# Patient Record
Sex: FEMALE | Race: WHITE | NOT HISPANIC OR LATINO | Employment: OTHER | URBAN - METROPOLITAN AREA
[De-identification: names, ages, dates, MRNs, and addresses within clinical notes are randomized per-mention and may not be internally consistent; named-entity substitution may affect disease eponyms.]

---

## 2017-01-09 ENCOUNTER — OFFICE VISIT (OUTPATIENT)
Dept: PULMONOLOGY | Facility: CLINIC | Age: 72
End: 2017-01-09

## 2017-01-09 VITALS
BODY MASS INDEX: 30.48 KG/M2 | DIASTOLIC BLOOD PRESSURE: 60 MMHG | SYSTOLIC BLOOD PRESSURE: 122 MMHG | RESPIRATION RATE: 16 BRPM | OXYGEN SATURATION: 90 % | HEIGHT: 63 IN | TEMPERATURE: 96.6 F | HEART RATE: 67 BPM | WEIGHT: 172 LBS

## 2017-01-09 DIAGNOSIS — K22.4 ESOPHAGEAL DYSMOTILITY: ICD-10-CM

## 2017-01-09 DIAGNOSIS — I51.89 DIASTOLIC DYSFUNCTION: ICD-10-CM

## 2017-01-09 DIAGNOSIS — J44.9 SEVERE CHRONIC OBSTRUCTIVE PULMONARY DISEASE (HCC): Primary | ICD-10-CM

## 2017-01-09 DIAGNOSIS — Z99.81 DEPENDENCE ON SUPPLEMENTAL OXYGEN: ICD-10-CM

## 2017-01-09 DIAGNOSIS — I27.20 PULMONARY HTN (HCC): ICD-10-CM

## 2017-01-09 PROCEDURE — 99214 OFFICE O/P EST MOD 30 MIN: CPT | Performed by: NURSE PRACTITIONER

## 2017-01-09 RX ORDER — MELOXICAM 7.5 MG/1
7.5 TABLET ORAL 2 TIMES DAILY
COMMUNITY

## 2017-01-09 NOTE — MR AVS SNAPSHOT
Virgie HOOKS Cruz   1/9/2017 1:00 PM   Office Visit    Dept Phone:  [phone]   Encounter #:  [id number]    Provider:  IRVING Lainez   Department:  [facility]                Your Full Care Plan              Today's Medication Changes          These changes are accurate as of: 1/9/17 11:15 AM.  If you have any questions, ask your nurse or doctor.               Stop taking medication(s)listed here:     ciprofloxacin 500 MG tablet   Commonly known as:  CIPRO           diclofenac-misoprostol 75-0.2 MG EC tablet   Commonly known as:  ARTHROTEC 75           ferrous sulfate 325 (65 FE) MG tablet           Umeclidinium Bromide 62.5 MCG/INH aerosol powder                       Your Updated Medication List          This list is accurate as of: 1/9/17 11:15 AM.  Always use your most recent med list.                acetaminophen 325 MG tablet   Commonly known as:  TYLENOL       albuterol (2.5 MG/3ML) 0.083% nebulizer solution   Commonly known as:  PROVENTIL   Take 2.5 mg by nebulization every 4 (four) hours as needed for wheezing.       aspirin 81 MG EC tablet       * BREO ELLIPTA 100-25 MCG/INH aerosol powder    Generic drug:  Fluticasone Furoate-Vilanterol       * Fluticasone Furoate-Vilanterol 100-25 MCG/INH aerosol powder    Inhale 1 puff daily.       buPROPion  MG 24 hr tablet   Commonly known as:  WELLBUTRIN XL       busPIRone 10 MG tablet   Commonly known as:  BUSPAR       fluorouracil 5 % cream   Commonly known as:  EFUDEX       furosemide 20 MG tablet   Commonly known as:  LASIX       gabapentin 800 MG tablet   Commonly known as:  NEURONTIN       hydrOXYzine 50 MG tablet   Commonly known as:  ATARAX       levocetirizine 5 MG tablet   Commonly known as:  XYZAL       levothyroxine 50 MCG tablet   Commonly known as:  SYNTHROID, LEVOTHROID       losartan 50 MG tablet   Commonly known as:  COZAAR       losartan-hydrochlorothiazide 100-25 MG per  "tablet   Commonly known as:  HYZAAR       magnesium oxide 400 (241.3 MG) MG tablet tablet   Commonly known as:  MAGOX       meloxicam 7.5 MG tablet   Commonly known as:  MOBIC       metoprolol succinate XL 50 MG 24 hr tablet   Commonly known as:  TOPROL-XL       montelukast 10 MG tablet   Commonly known as:  SINGULAIR       O2   Commonly known as:  OXYGEN       pantoprazole 40 MG EC tablet   Commonly known as:  PROTONIX       pravastatin 40 MG tablet   Commonly known as:  PRAVACHOL       spironolactone 25 MG tablet   Commonly known as:  ALDACTONE       * Notice:  This list has 2 medication(s) that are the same as other medications prescribed for you. Read the directions carefully, and ask your doctor or other care provider to review them with you.            Instructions     None    Patient Instructions History      Upcoming Appointments     Visit Type Date Time Department    FOLLOW UP 1/9/2017  1:00 PM [facility]      [employer] Signup     Our records indicate that you have declined [facility] signup. If you would like to sign up for [employer], please email [email or web address] or call [phone] to obtain an activation code.             Other Info from Your Visit           Your Appointments     Jan 09, 2017  1:00 PM EST   Follow Up with IRVING Lainez   [facility] (--)    Jolie Armstrong. [address] [place] [id number] [phone]           Arrive 15 minutes prior to appointment.              Allergies     No Known Allergies      Reason for Visit     COPD           Vital Signs     Blood Pressure Pulse Temperature Respirations Height Weight    122/60 67 96.6 °F (35.9 °C) 16 63\" (160 cm) 172 lb (78 kg)    Oxygen Saturation Body Mass Index Smoking Status             90% 30.47 kg/m2 Former Smoker           "

## 2017-01-09 NOTE — PROGRESS NOTES
[facility] Pulmonary Follow up    CHIEF COMPLAINT    Six-month follow-up    HISTORY OF PRESENT ILLNESS    Virgie Arroyo is a 71 y.o.female here today for 6 month follow-up.  She has a history of chronic short pulmonary disease with PFTs consistent with severe obstruction.  She is last seen in the office in June.  She takes Breo and Incruse.  She is here today with some paperwork for patient assistance with her medications.  Those 2 seem to be working well for her.  She has tried other inhalers in the past and they have not been quite as effective.  She does have a chronic cough as well as chronic dyspnea.  She's chronic respiratory failure and is on 2 L nasal cannula at home.  She tolerates it well and does help with her chronic dyspnea on exertion.    She has no significant reflux symptoms.  She does have a history of esophageal dysmotility but denies any history of dysphagia.    She does not complain of any sinus allergies or drainage.    She does not clinically chest pain chest pressure palpitations.  She denies any lower extremity edema.    Since her last hospitalization last April she has been walking with a walker due to some generalized debility.    Patient Active Problem List   Diagnosis   • Severe chronic obstructive pulmonary disease   • Esophageal dysmotility   • Atopic rhinitis   • Diastolic dysfunction   • Acute bronchitis   • Anxiety   • Depression   • Atypical chest pain   • Congestive heart failure   • Dyslipidemia   • Hypertension   • Hypothyroidism   • Osteoarthritis   • Dependence on supplemental oxygen   • Pulmonary HTN       No Known Allergies    Current Outpatient Prescriptions:   •  acetaminophen (TYLENOL) 325 MG tablet, Take 650 mg by mouth every 6 (six) hours as needed for mild pain (1-3)., Disp: , Rfl:   •  albuterol (PROVENTIL) (2.5 MG/3ML) 0.083% nebulizer solution, Take 2.5 mg by nebulization every 4 (four) hours as needed for wheezing., Disp: 60 vial, Rfl: 12  •  aspirin 81 MG EC  tablet, Take 81 mg by mouth daily., Disp: , Rfl:   •  buPROPion XL (WELLBUTRIN XL) 300 MG 24 hr tablet, Take 300 mg by mouth daily., Disp: , Rfl:   •  busPIRone (BUSPAR) 10 MG tablet, Take 10 mg by mouth 3 (three) times a day., Disp: , Rfl:   •  fluorouracil (EFUDEX) 5 % cream, Apply  topically 2 (two) times a day., Disp: , Rfl:   •  Fluticasone Furoate-Vilanterol (BREO ELLIPTA) 100-25 MCG/INH aerosol powder , Inhale., Disp: , Rfl:   •  Fluticasone Furoate-Vilanterol 100-25 MCG/INH aerosol powder , Inhale 1 puff daily., Disp: 1 each, Rfl: 11  •  furosemide (LASIX) 20 MG tablet, Take 20 mg by mouth 2 (two) times a day., Disp: , Rfl:   •  gabapentin (NEURONTIN) 800 MG tablet, daily., Disp: , Rfl:   •  hydrOXYzine (ATARAX) 50 MG tablet, Take 50 mg by mouth 3 (three) times a day as needed for itching., Disp: , Rfl:   •  levocetirizine (XYZAL) 5 MG tablet, Take 5 mg by mouth every evening., Disp: , Rfl:   •  levothyroxine (SYNTHROID, LEVOTHROID) 50 MCG tablet, Take 50 mcg by mouth daily., Disp: , Rfl:   •  losartan (COZAAR) 50 MG tablet, Take 50 mg by mouth daily., Disp: , Rfl:   •  losartan-hydrochlorothiazide (HYZAAR) 100-25 MG per tablet, Take 1 tablet by mouth daily., Disp: , Rfl:   •  magnesium oxide (MAGOX) 400 (241.3 MG) MG tablet tablet, Take 400 mg by mouth daily., Disp: , Rfl:   •  meloxicam (MOBIC) 7.5 MG tablet, Take 7.5 mg by mouth Daily., Disp: , Rfl:   •  metoprolol succinate XL (TOPROL-XL) 50 MG 24 hr tablet, Take 50 mg by mouth daily., Disp: , Rfl:   •  montelukast (SINGULAIR) 10 MG tablet, Take 10 mg by mouth every night., Disp: , Rfl:   •  O2 (OXYGEN), Inhale 3 L/min daily., Disp: , Rfl:   •  pantoprazole (PROTONIX) 40 MG EC tablet, Take 40 mg by mouth daily., Disp: , Rfl:   •  pravastatin (PRAVACHOL) 40 MG tablet, Take 40 mg by mouth daily., Disp: , Rfl:   •  spironolactone (ALDACTONE) 25 MG tablet, Take 25 mg by mouth daily., Disp: , Rfl:   MEDICATION LIST AND ALLERGIES REVIEWED.    Social History  "  Substance Use Topics   • Smoking status: Former Smoker     Packs/day: 2.00     Years: 30.00     Types: Cigarettes     Start date: 1972     Quit date: 2002   • Smokeless tobacco: Not on file   • Alcohol use 1.2 oz/week     2 Cans of beer per week      Comment: daily.       FAMILY AND SOCIAL HISTORY REVIEWED.    Review of Systems   Constitutional: Positive for fatigue. Negative for chills, fever and unexpected weight change.   HENT: Negative for congestion, nosebleeds, postnasal drip, rhinorrhea, sinus pressure and trouble swallowing.    Respiratory: Positive for cough and shortness of breath. Negative for chest tightness and wheezing.    Cardiovascular: Negative for chest pain and leg swelling.   Gastrointestinal: Negative for abdominal pain, constipation, diarrhea, nausea and vomiting.   Genitourinary: Negative for dysuria, frequency, hematuria and urgency.   Musculoskeletal: Positive for gait problem. Negative for myalgias.   Neurological: Positive for weakness. Negative for dizziness, numbness and headaches.   All other systems reviewed and are negative.  .    Visit Vitals   • /60   • Pulse 67   • Temp 96.6 °F (35.9 °C)   • Resp 16   • Ht 63\" (160 cm)   • Wt 172 lb (78 kg)   • SpO2 90%   • BMI 30.47 kg/m2     Physical Exam   Constitutional: She is oriented to person, place, and time. She appears well-developed and well-nourished.   Uses a walker, obese   HENT:   Head: Normocephalic and atraumatic.   Eyes: EOM are normal. Pupils are equal, round, and reactive to light.   Neck: Normal range of motion. Neck supple.   Cardiovascular: Normal rate and regular rhythm.    No murmur heard.  Pulmonary/Chest: Effort normal. No respiratory distress. She has wheezes. She has no rales.   Occasional rhonchi, slight wheeze right greater than left   Abdominal: Soft. Bowel sounds are normal. She exhibits no distension.   Musculoskeletal: Normal range of motion. She exhibits no edema.   Neurological: She is alert and " oriented to person, place, and time.   Skin: Skin is warm and dry. No erythema.   Psychiatric: She has a normal mood and affect. Her behavior is normal.   Vitals reviewed.      RESULTS        PROBLEM LIST    Problem List Items Addressed This Visit        Cardiovascular and Mediastinum    Diastolic dysfunction    Pulmonary HTN       Respiratory    Severe chronic obstructive pulmonary disease - Primary    Overview     Description: A.  Emphysema with severe physiologic airway obstruction.  B.  Dyspnea and hypoxia, on 4 L oxygen at rest and 6 L with activity.  C.  Former smoker.            Digestive    Esophageal dysmotility    Overview     Description: A.  Upper GI of 10/20/2014 reports a small hiatal hernia with esophageal dysmotility.            Other    Dependence on supplemental oxygen    Overview     · A.  4 L at rest and up to 6 with exertion.                    DISCUSSION    Follow-up in 6 months with PFTs and chest x-ray    Continue on Breo and Incruse, we will help the patient assistance paperwork    Continue on continuous oxygen secondary to chronic hypoxic respiratory failure    Continue on PPI and reflux precautions.    Gilda Jesus, IRVING  01/09/20172:03 PM  Electronically signed     Please note that portions of this note were completed with a voice recognition program. Efforts were made to edit the dictations, but occasionally words are mistranscribed.      CC: Juliette Rodríguez MD

## 2017-04-07 ENCOUNTER — TELEPHONE (OUTPATIENT)
Dept: PULMONOLOGY | Facility: CLINIC | Age: 72
End: 2017-04-07

## 2017-04-07 NOTE — TELEPHONE ENCOUNTER
Returned patient call from voicemail received this morning, patient did not answer and there was no option for voicemail.

## 2017-04-17 DIAGNOSIS — R06.02 SOB (SHORTNESS OF BREATH): Primary | ICD-10-CM

## 2017-07-13 ENCOUNTER — OFFICE VISIT (OUTPATIENT)
Dept: PULMONOLOGY | Facility: CLINIC | Age: 72
End: 2017-07-13

## 2017-07-13 VITALS
OXYGEN SATURATION: 94 % | BODY MASS INDEX: 34.32 KG/M2 | RESPIRATION RATE: 18 BRPM | TEMPERATURE: 97.6 F | HEIGHT: 61 IN | HEART RATE: 58 BPM | DIASTOLIC BLOOD PRESSURE: 64 MMHG | WEIGHT: 181.8 LBS | SYSTOLIC BLOOD PRESSURE: 118 MMHG

## 2017-07-13 DIAGNOSIS — J44.9 SEVERE CHRONIC OBSTRUCTIVE PULMONARY DISEASE (HCC): Primary | ICD-10-CM

## 2017-07-13 DIAGNOSIS — I27.20 PULMONARY HTN (HCC): ICD-10-CM

## 2017-07-13 DIAGNOSIS — K22.4 ESOPHAGEAL DYSMOTILITY: ICD-10-CM

## 2017-07-13 DIAGNOSIS — Z99.81 DEPENDENCE ON SUPPLEMENTAL OXYGEN: ICD-10-CM

## 2017-07-13 DIAGNOSIS — J30.9 ATOPIC RHINITIS: ICD-10-CM

## 2017-07-13 DIAGNOSIS — I51.89 DIASTOLIC DYSFUNCTION: ICD-10-CM

## 2017-07-13 PROCEDURE — 99214 OFFICE O/P EST MOD 30 MIN: CPT | Performed by: NURSE PRACTITIONER

## 2017-07-13 PROCEDURE — 71020 CHG CHEST X-RAY 2 VW: CPT | Performed by: NURSE PRACTITIONER

## 2017-07-13 PROCEDURE — 94010 BREATHING CAPACITY TEST: CPT | Performed by: NURSE PRACTITIONER

## 2017-07-13 RX ORDER — ALBUTEROL SULFATE 90 UG/1
2 AEROSOL, METERED RESPIRATORY (INHALATION) EVERY 6 HOURS PRN
COMMUNITY
End: 2017-12-27 | Stop reason: HOSPADM

## 2017-07-13 RX ORDER — FLUTICASONE PROPIONATE 50 MCG
SPRAY, SUSPENSION (ML) NASAL DAILY
COMMUNITY
Start: 2017-07-07 | End: 2017-12-20

## 2017-07-13 RX ORDER — VITAMIN E 268 MG
CAPSULE ORAL
COMMUNITY
Start: 2017-04-18 | End: 2017-10-10 | Stop reason: ALTCHOICE

## 2017-07-13 RX ORDER — OXYBUTYNIN CHLORIDE 5 MG/1
1 TABLET ORAL 3 TIMES DAILY
COMMUNITY
Start: 2017-06-03 | End: 2017-12-20

## 2017-07-13 NOTE — PROGRESS NOTES
[facility] Follow [facility]    CHIEF COMPLAINT    COPD follow-up    HISTORY OF PRESENT ILLNESS    Virgie Arroyo is a 71 y.o.female here today for six-month follow-up with her severe obstructive airway disease.  She has done well over the last 6 months.  She's had no acute exacerbations the use of steroids or antibiotics.  She continues on her maintenance medications daily and is doing well.  She uses nebulizers as needed.  She has no cough or sputum production at this time.  She has chronic dyspnea on exertion as well as chronic hypoxemia and uses oxygen at 2 L nasal cannula.  She has chronic debility and uses a walker.  She denies any worsening in her weakness.  She denies any falls.    Unfortunately her son has been in the hospital in [place] for the last month.  He also has emphysema and had a pneumonia and now has a tracheostomy and still on the ventilator.  She's been quite worried about him as well as traveling back and forth.        Patient Active Problem List   Diagnosis   • Severe chronic obstructive pulmonary disease   • Esophageal dysmotility   • Atopic rhinitis   • Diastolic dysfunction   • Acute bronchitis   • Anxiety   • Depression   • Atypical chest pain   • Congestive heart failure   • Dyslipidemia   • Hypertension   • Hypothyroidism   • Osteoarthritis   • Dependence on supplemental oxygen   • Pulmonary HTN       No Known Allergies    Current Outpatient Prescriptions:   •  acetaminophen (TYLENOL) 325 MG tablet, Take 650 mg by mouth every 6 (six) hours as needed for mild pain (1-3)., Disp: , Rfl:   •  albuterol (PROVENTIL) (2.5 MG/3ML) 0.083% nebulizer solution, Take 2.5 mg by nebulization every 4 (four) hours as needed for wheezing., Disp: 60 vial, Rfl: 12  •  aspirin 81 MG EC tablet, Take 81 mg by mouth daily., Disp: , Rfl:   •  buPROPion XL (WELLBUTRIN XL) 300 MG 24 hr tablet, Take 300 mg by mouth daily., Disp: , Rfl:   •  busPIRone (BUSPAR) 10 MG tablet, Take 10 mg by mouth 3 (three) times a  day., Disp: , Rfl:   •  Cyanocobalamin (VITAMIN B-12 CR PO), , Disp: , Rfl:   •  fluorouracil (EFUDEX) 5 % cream, Apply  topically 2 (two) times a day., Disp: , Rfl:   •  fluticasone (FLONASE) 50 MCG/ACT nasal spray, Daily., Disp: , Rfl:   •  Fluticasone Furoate-Vilanterol (BREO ELLIPTA) 100-25 MCG/INH aerosol powder , Inhale., Disp: , Rfl:   •  furosemide (LASIX) 20 MG tablet, Take 20 mg by mouth 2 (two) times a day., Disp: , Rfl:   •  gabapentin (NEURONTIN) 800 MG tablet, daily., Disp: , Rfl:   •  hydrOXYzine (ATARAX) 50 MG tablet, Take 50 mg by mouth As Needed for Itching., Disp: , Rfl:   •  levocetirizine (XYZAL) 5 MG tablet, Take 5 mg by mouth every evening., Disp: , Rfl:   •  levothyroxine (SYNTHROID, LEVOTHROID) 50 MCG tablet, Take 50 mcg by mouth daily., Disp: , Rfl:   •  losartan (COZAAR) 50 MG tablet, Take 50 mg by mouth daily., Disp: , Rfl:   •  magnesium oxide (MAGOX) 400 (241.3 MG) MG tablet tablet, Take 400 mg by mouth daily., Disp: , Rfl:   •  meloxicam (MOBIC) 7.5 MG tablet, Take 7.5 mg by mouth Daily., Disp: , Rfl:   •  metoprolol succinate XL (TOPROL-XL) 50 MG 24 hr tablet, Take 50 mg by mouth daily., Disp: , Rfl:   •  montelukast (SINGULAIR) 10 MG tablet, Take 10 mg by mouth every night., Disp: , Rfl:   •  Multiple Vitamins-Calcium (ONE-A-DAY WOMENS PO), , Disp: , Rfl:   •  O2 (OXYGEN), Inhale 3 L/min daily., Disp: , Rfl:   •  oxybutynin (DITROPAN) 5 MG tablet, 1 tablet 3 (Three) Times a Day., Disp: , Rfl:   •  pantoprazole (PROTONIX) 40 MG EC tablet, Take 40 mg by mouth daily., Disp: , Rfl:   •  pravastatin (PRAVACHOL) 40 MG tablet, Take 40 mg by mouth daily., Disp: , Rfl:   •  spironolactone (ALDACTONE) 25 MG tablet, Take 25 mg by mouth daily., Disp: , Rfl:   •  Umeclidinium Bromide 62.5 MCG/INH aerosol powder , Inhale 1 puff Daily. 1 inhalation once a day, Disp: 1 each, Rfl: 5  •  vitamin E 400 UNIT capsule, , Disp: , Rfl:   MEDICATION LIST AND ALLERGIES REVIEWED.    Social History   Substance  "Use Topics   • Smoking status: Former Smoker     Packs/day: 2.00     Years: 30.00     Types: Cigarettes     Start date: 1972     Quit date: 2002   • Smokeless tobacco: Not on file   • Alcohol use 1.2 oz/week     2 Cans of beer per week      Comment: daily.       FAMILY AND SOCIAL HISTORY REVIEWED.    Review of Systems   Constitutional: Positive for fatigue. Negative for chills, fever and unexpected weight change.   HENT: Positive for postnasal drip and sinus pressure. Negative for congestion, nosebleeds, rhinorrhea and trouble swallowing.    Respiratory: Positive for cough and shortness of breath. Negative for chest tightness and wheezing.    Cardiovascular: Negative for chest pain and leg swelling.   Gastrointestinal: Negative for abdominal pain, constipation, diarrhea, nausea and vomiting.   Genitourinary: Negative for dysuria, frequency, hematuria and urgency.   Musculoskeletal: Positive for arthralgias and gait problem. Negative for myalgias.   Neurological: Positive for weakness. Negative for dizziness, numbness and headaches.   All other systems reviewed and are negative.  .    /64  Pulse 58  Temp 97.6 °F (36.4 °C)  Resp 18  Ht 60.5\" (153.7 cm)  Wt 181 lb 12.8 oz (82.5 kg)  SpO2 94% Comment: 3L P/D  BMI 34.92 kg/m2    Physical Exam   Constitutional: She is oriented to person, place, and time. She appears well-developed and well-nourished.   Obese, debilitated   HENT:   Head: Normocephalic and atraumatic.   Eyes: EOM are normal. Pupils are equal, round, and reactive to light.   Neck: Normal range of motion. Neck supple.   Cardiovascular: Normal rate and regular rhythm.    No murmur heard.  Pulmonary/Chest: Effort normal. No respiratory distress. She has wheezes. She has no rales.   Coarse rales bilaterally with expiratory wheezing   Abdominal: Soft. Bowel sounds are normal. She exhibits no distension.   Musculoskeletal: Normal range of motion. She exhibits no edema.   Neurological: She is alert and " oriented to person, place, and time.   Skin: Skin is warm and dry. No erythema.   Psychiatric: She has a normal mood and affect. Her behavior is normal.   Vitals reviewed.        RESULTS    PFTS in the office today, read by me:No severe change in 2016 with severe obstructive airway disease, FEV1 0.9 147%.    PA and lateral chest x-ray done in the office today, reviewed by me: No acute infiltrates or effusions    PROBLEM LIST    Problem List Items Addressed This Visit        Cardiovascular and Mediastinum    Diastolic dysfunction    Pulmonary HTN       Respiratory    Severe chronic obstructive pulmonary disease - Primary    Overview     Description: A.  Emphysema with severe physiologic airway obstruction.  B.  Dyspnea and hypoxia, on 4 L oxygen at rest and 6 L with activity.  C.  Former smoker.         Relevant Medications    fluticasone (FLONASE) 50 MCG/ACT nasal spray    Other Relevant Orders    XR Chest PA & Lateral    Atopic rhinitis       Digestive    Esophageal dysmotility    Overview     Description: A.  Upper GI of 10/20/2014 reports a small hiatal hernia with esophageal dysmotility.            Other    Dependence on supplemental oxygen    Overview     · A.  4 L at rest and up to 6 with exertion.                    DISCUSSION    A total of 20 minutes was spent with face-to-face time in the office today discussing COPD management.  We spent time counseling on tobacco cessation, maintenance medication, the use of oxygen with chronic respiratory failure, patient and family education regarding disease management.  As well as the importance of compliance with medication and regular follow-up.    She'll continue on her current regimen of Breo and Incruse daily.  She has nebulizers to be used as needed for shortness of breath wheezing or cough.  Continue on her oxygen continuously.  Does help with her chronic dyspnea.  She uses [facility] for her DME.  Continue with her reflux precautions and  PPI.    Follow-up in 6 months    Gilda Jesus, APRN  07/13/201712:29 PM  Electronically signed     Please note that portions of this note were completed with a voice recognition program. Efforts were made to edit the dictations, but occasionally words are mistranscribed.      CC: Juliette Rodríguez MD

## 2017-10-10 ENCOUNTER — OFFICE VISIT (OUTPATIENT)
Dept: PULMONOLOGY | Facility: CLINIC | Age: 72
End: 2017-10-10

## 2017-10-10 VITALS
HEIGHT: 61 IN | DIASTOLIC BLOOD PRESSURE: 80 MMHG | OXYGEN SATURATION: 89 % | TEMPERATURE: 96.7 F | HEART RATE: 64 BPM | RESPIRATION RATE: 16 BRPM | SYSTOLIC BLOOD PRESSURE: 122 MMHG | WEIGHT: 181 LBS | BODY MASS INDEX: 34.17 KG/M2

## 2017-10-10 DIAGNOSIS — I50.9 CONGESTIVE HEART FAILURE, UNSPECIFIED CONGESTIVE HEART FAILURE CHRONICITY, UNSPECIFIED CONGESTIVE HEART FAILURE TYPE: ICD-10-CM

## 2017-10-10 DIAGNOSIS — R53.83 FATIGUE, UNSPECIFIED TYPE: ICD-10-CM

## 2017-10-10 DIAGNOSIS — J44.9 SEVERE CHRONIC OBSTRUCTIVE PULMONARY DISEASE (HCC): Primary | ICD-10-CM

## 2017-10-10 DIAGNOSIS — Z99.81 DEPENDENCE ON SUPPLEMENTAL OXYGEN: ICD-10-CM

## 2017-10-10 LAB
ALBUMIN SERPL-MCNC: 4.4 G/DL (ref 3.2–4.8)
ALBUMIN/GLOB SERPL: 1.8 G/DL (ref 1.5–2.5)
ALP SERPL-CCNC: 66 U/L (ref 25–100)
ALT SERPL W P-5'-P-CCNC: 22 U/L (ref 7–40)
ANION GAP SERPL CALCULATED.3IONS-SCNC: 8 MMOL/L (ref 3–11)
AST SERPL-CCNC: 30 U/L (ref 0–33)
BASOPHILS # BLD AUTO: 0.04 10*3/MM3 (ref 0–0.2)
BASOPHILS NFR BLD AUTO: 0.7 % (ref 0–1)
BILIRUB SERPL-MCNC: 0.6 MG/DL (ref 0.3–1.2)
BNP SERPL-MCNC: 247 PG/ML (ref 0–100)
BUN BLD-MCNC: 14 MG/DL (ref 9–23)
BUN/CREAT SERPL: 20 (ref 7–25)
CALCIUM SPEC-SCNC: 9 MG/DL (ref 8.7–10.4)
CHLORIDE SERPL-SCNC: 93 MMOL/L (ref 99–109)
CO2 SERPL-SCNC: 30 MMOL/L (ref 20–31)
CREAT BLD-MCNC: 0.7 MG/DL (ref 0.6–1.3)
DEPRECATED RDW RBC AUTO: 47.9 FL (ref 37–54)
EOSINOPHIL # BLD AUTO: 0.14 10*3/MM3 (ref 0–0.3)
EOSINOPHIL NFR BLD AUTO: 2.5 % (ref 0–3)
ERYTHROCYTE [DISTWIDTH] IN BLOOD BY AUTOMATED COUNT: 13.9 % (ref 11.3–14.5)
GFR SERPL CREATININE-BSD FRML MDRD: 82 ML/MIN/1.73
GLOBULIN UR ELPH-MCNC: 2.4 GM/DL
GLUCOSE BLD-MCNC: 89 MG/DL (ref 70–100)
HCT VFR BLD AUTO: 35 % (ref 34.5–44)
HGB BLD-MCNC: 11.9 G/DL (ref 11.5–15.5)
IMM GRANULOCYTES # BLD: 0.01 10*3/MM3 (ref 0–0.03)
IMM GRANULOCYTES NFR BLD: 0.2 % (ref 0–0.6)
LYMPHOCYTES # BLD AUTO: 0.99 10*3/MM3 (ref 0.6–4.8)
LYMPHOCYTES NFR BLD AUTO: 17.9 % (ref 24–44)
MCH RBC QN AUTO: 31.9 PG (ref 27–31)
MCHC RBC AUTO-ENTMCNC: 34 G/DL (ref 32–36)
MCV RBC AUTO: 93.8 FL (ref 80–99)
MONOCYTES # BLD AUTO: 0.48 10*3/MM3 (ref 0–1)
MONOCYTES NFR BLD AUTO: 8.7 % (ref 0–12)
NEUTROPHILS # BLD AUTO: 3.87 10*3/MM3 (ref 1.5–8.3)
NEUTROPHILS NFR BLD AUTO: 70 % (ref 41–71)
PLATELET # BLD AUTO: 242 10*3/MM3 (ref 150–450)
PMV BLD AUTO: 8.7 FL (ref 6–12)
POTASSIUM BLD-SCNC: 4.3 MMOL/L (ref 3.5–5.5)
PROT SERPL-MCNC: 6.8 G/DL (ref 5.7–8.2)
RBC # BLD AUTO: 3.73 10*6/MM3 (ref 3.89–5.14)
SODIUM BLD-SCNC: 131 MMOL/L (ref 132–146)
WBC NRBC COR # BLD: 5.53 10*3/MM3 (ref 3.5–10.8)

## 2017-10-10 PROCEDURE — 99213 OFFICE O/P EST LOW 20 MIN: CPT | Performed by: NURSE PRACTITIONER

## 2017-10-10 PROCEDURE — 80053 COMPREHEN METABOLIC PANEL: CPT | Performed by: NURSE PRACTITIONER

## 2017-10-10 PROCEDURE — 83880 ASSAY OF NATRIURETIC PEPTIDE: CPT | Performed by: NURSE PRACTITIONER

## 2017-10-10 PROCEDURE — 36415 COLL VENOUS BLD VENIPUNCTURE: CPT | Performed by: NURSE PRACTITIONER

## 2017-10-10 PROCEDURE — 85025 COMPLETE CBC W/AUTO DIFF WBC: CPT | Performed by: NURSE PRACTITIONER

## 2017-10-10 RX ORDER — FUROSEMIDE 40 MG/1
40 TABLET ORAL DAILY
COMMUNITY
Start: 2017-10-03 | End: 2018-02-07

## 2017-10-10 RX ORDER — PREDNISONE 10 MG/1
TABLET ORAL
Qty: 31 TABLET | Refills: 0 | Status: SHIPPED | OUTPATIENT
Start: 2017-10-10 | End: 2017-10-24

## 2017-10-10 NOTE — PROGRESS NOTES
[facility] Pulmonary Follow up    CHIEF COMPLAINT    Shortness of breath    HISTORY OF PRESENT ILLNESS    Virgie Arroyo is a 72 y.o.female here today for worsening shortness of breath and fatigue.  This has been a gradual worsening over the last few weeks.    She has severe COPD and remains on Breo and as needed albuterol.  She has been using her nebulized albuterol 3-4 times daily.  She has previously used Incruse but states that she has been out for a while.  She denies an increase in her baseline wheezing.  She denies cough, sputum production, fever, or chills.       She remains on 3 L supplemental oxygen.  She reports that she has been running around the low 90s whenever she checks her saturations.     She reports lower extremity edema.  Her PCP, Dr. Ayon, thought that her complaints could be related to her CHF.  He increased her diuretics and has been following up with her every 2 weeks to monitor her edema.  She reports it has improved some but she does still retain some fluid.          Patient Active Problem List   Diagnosis   • Severe chronic obstructive pulmonary disease   • Esophageal dysmotility   • Atopic rhinitis   • Diastolic dysfunction   • Acute bronchitis   • Anxiety   • Depression   • Atypical chest pain   • Congestive heart failure   • Dyslipidemia   • Hypertension   • Hypothyroidism   • Osteoarthritis   • Dependence on supplemental oxygen   • Pulmonary HTN       No Known Allergies    Current Outpatient Prescriptions:   •  acetaminophen (TYLENOL) 325 MG tablet, Take 650 mg by mouth every 6 (six) hours as needed for mild pain (1-3)., Disp: , Rfl:   •  albuterol (PROAIR HFA) 108 (90 BASE) MCG/ACT inhaler, Inhale 2 puffs As Needed for Wheezing., Disp: , Rfl:   •  albuterol (PROVENTIL) (2.5 MG/3ML) 0.083% nebulizer solution, Take 2.5 mg by nebulization every 4 (four) hours as needed for wheezing., Disp: 60 vial, Rfl: 12  •  aspirin 81 MG EC tablet, Take 81 mg by mouth daily., Disp: , Rfl:   •   busPIRone (BUSPAR) 10 MG tablet, Take 10 mg by mouth 3 (three) times a day., Disp: , Rfl:   •  fluorouracil (EFUDEX) 5 % cream, Apply  topically 2 (two) times a day., Disp: , Rfl:   •  fluticasone (FLONASE) 50 MCG/ACT nasal spray, Daily., Disp: , Rfl:   •  Fluticasone Furoate-Vilanterol (BREO ELLIPTA) 100-25 MCG/INH aerosol powder , Inhale., Disp: , Rfl:   •  furosemide (LASIX) 40 MG tablet, 1 tablet Daily., Disp: , Rfl:   •  gabapentin (NEURONTIN) 800 MG tablet, daily., Disp: , Rfl:   •  hydrOXYzine (ATARAX) 50 MG tablet, Take 50 mg by mouth As Needed for Itching., Disp: , Rfl:   •  levocetirizine (XYZAL) 5 MG tablet, Take 5 mg by mouth every evening., Disp: , Rfl:   •  levothyroxine (SYNTHROID, LEVOTHROID) 50 MCG tablet, Take 50 mcg by mouth daily., Disp: , Rfl:   •  losartan (COZAAR) 50 MG tablet, Take 50 mg by mouth daily., Disp: , Rfl:   •  meloxicam (MOBIC) 7.5 MG tablet, Take 7.5 mg by mouth Daily., Disp: , Rfl:   •  metoprolol succinate XL (TOPROL-XL) 50 MG 24 hr tablet, Take 50 mg by mouth daily., Disp: , Rfl:   •  montelukast (SINGULAIR) 10 MG tablet, Take 10 mg by mouth every night., Disp: , Rfl:   •  O2 (OXYGEN), Inhale 3 L/min daily., Disp: , Rfl:   •  oxybutynin (DITROPAN) 5 MG tablet, 1 tablet 3 (Three) Times a Day., Disp: , Rfl:   •  pantoprazole (PROTONIX) 40 MG EC tablet, Take 40 mg by mouth daily., Disp: , Rfl:   •  pravastatin (PRAVACHOL) 40 MG tablet, Take 40 mg by mouth daily., Disp: , Rfl:   •  spironolactone (ALDACTONE) 25 MG tablet, Take 25 mg by mouth daily., Disp: , Rfl:   •  Umeclidinium Bromide 62.5 MCG/INH aerosol powder , Inhale 1 puff Daily. 1 inhalation once a day, Disp: 1 each, Rfl: 5  •  predniSONE (DELTASONE) 10 MG tablet, Take 4 tabs daily x 4 days, then take 3 daily x 3 days, then take 2 daily for 2 days, then take 1 daily x 2 days, Disp: 31 tablet, Rfl: 0  MEDICATION LIST AND ALLERGIES REVIEWED.    Social History   Substance Use Topics   • Smoking status: Former Smoker      "Packs/day: 2.00     Years: 30.00     Types: Cigarettes     Start date: 1972     Quit date: 2002   • Smokeless tobacco: None   • Alcohol use 1.2 oz/week     2 Cans of beer per week      Comment: daily.       FAMILY AND SOCIAL HISTORY REVIEWED.    Review of Systems   Constitutional: Positive for fatigue. Negative for chills, fever and unexpected weight change.   HENT: Negative for congestion, nosebleeds, postnasal drip, rhinorrhea, sinus pressure and trouble swallowing.    Respiratory: Positive for shortness of breath. Negative for cough, chest tightness and wheezing.    Cardiovascular: Positive for leg swelling. Negative for chest pain.   Gastrointestinal: Negative for abdominal pain, constipation, diarrhea, nausea and vomiting.   Genitourinary: Negative for dysuria, frequency, hematuria and urgency.   Musculoskeletal: Negative for myalgias.   Neurological: Negative for dizziness, weakness, numbness and headaches.   All other systems reviewed and are negative.  .    /80  Pulse 64  Temp 96.7 °F (35.9 °C)  Resp 16  Ht 60.5\" (153.7 cm)  Wt 181 lb (82.1 kg)  SpO2 (!) 89% Comment: 3L P/D  BMI 34.77 kg/m2    Physical Exam   Constitutional: She appears well-developed. No distress.   Cardiovascular: Normal rate, regular rhythm and normal heart sounds.    No murmur heard.  Pulmonary/Chest: Effort normal. No respiratory distress. She has wheezes. She has no rales.   Musculoskeletal: She exhibits edema (mild BLE edema). She exhibits no tenderness.   In a wheelchair   Neurological: She is alert.   Skin: Skin is warm and dry. No erythema.   Psychiatric: She has a normal mood and affect. Her behavior is normal.   Vitals reviewed.        RESULTS        PROBLEM LIST    Problem List Items Addressed This Visit        Cardiovascular and Mediastinum    Congestive heart failure    Relevant Orders    CBC & Differential    Comprehensive Metabolic Panel    BNP    CBC Auto Differential       Respiratory    Severe chronic " obstructive pulmonary disease - Primary    Overview     Description: A.  Emphysema with severe physiologic airway obstruction.  B.  Dyspnea and hypoxia, on 4 L oxygen at rest and 6 L with activity.  C.  Former smoker.         Relevant Medications    predniSONE (DELTASONE) 10 MG tablet       Other    Dependence on supplemental oxygen    Overview     · A.  4 L at rest and up to 6 with exertion.              Other Visit Diagnoses     Fatigue, unspecified type        Relevant Orders    CBC & Differential    Comprehensive Metabolic Panel    BNP    CBC Auto Differential            DISCUSSION    Severe COPD-   -Continue Breo 100 , resume Incruse.  She was given samples of both.    -Prednisone taper for her wheezing   -We also discussed the possibility of changing her to a nebulized LABA+ICS.   We'll see how she responds to prednisone and adding Incruse back in before making this change.    CHF- continue diuretics as prescribed by PCP.  Check labs as above.    Follow-up in 2 weeks     IRVING Velarde  10/10/69090:28 PM  Electronically signed     Please note that portions of this note were completed with a voice recognition program. Efforts were made to edit the dictations, but occasionally words are mistranscribed.      CC: Juliette Rodríguez MD

## 2017-10-16 RX ORDER — ALBUTEROL SULFATE 2.5 MG/3ML
SOLUTION RESPIRATORY (INHALATION)
Qty: 120 ML | Refills: 11 | Status: SHIPPED | OUTPATIENT
Start: 2017-10-16 | End: 2017-12-27 | Stop reason: HOSPADM

## 2017-10-24 ENCOUNTER — OFFICE VISIT (OUTPATIENT)
Dept: PULMONOLOGY | Facility: CLINIC | Age: 72
End: 2017-10-24

## 2017-10-24 VITALS
BODY MASS INDEX: 34.17 KG/M2 | DIASTOLIC BLOOD PRESSURE: 70 MMHG | TEMPERATURE: 96.3 F | SYSTOLIC BLOOD PRESSURE: 142 MMHG | HEIGHT: 61 IN | OXYGEN SATURATION: 95 % | WEIGHT: 181 LBS | HEART RATE: 62 BPM | RESPIRATION RATE: 20 BRPM

## 2017-10-24 DIAGNOSIS — I50.9 CONGESTIVE HEART FAILURE, UNSPECIFIED CONGESTIVE HEART FAILURE CHRONICITY, UNSPECIFIED CONGESTIVE HEART FAILURE TYPE: ICD-10-CM

## 2017-10-24 DIAGNOSIS — Z99.81 DEPENDENCE ON SUPPLEMENTAL OXYGEN: ICD-10-CM

## 2017-10-24 DIAGNOSIS — J44.9 SEVERE CHRONIC OBSTRUCTIVE PULMONARY DISEASE (HCC): Primary | ICD-10-CM

## 2017-10-24 PROCEDURE — 99213 OFFICE O/P EST LOW 20 MIN: CPT | Performed by: NURSE PRACTITIONER

## 2017-10-24 NOTE — PROGRESS NOTES
"[facility] Pulmonary Follow up    CHIEF COMPLAINT    Shortness of breath    HISTORY OF PRESENT ILLNESS    Virgie Arroyo is a 72 y.o.female here today for follow-up on her gradually worsening shortness of breath and fatigue.  I last saw her 2 weeks ago.    At that time she was on Breo and using nebulized albuterol 3-4 times daily.  She had been out of Incruse for a while.  I provided her with Incruse samples and also gave her a pulse dose of prednisone as she had quite a bit of wheezing.  She completed her prednisone as prescribed and felt a little better while she was on it but is still short of breath.  We discussed possibly switching her from Breo nebulized Brovana on and Pulmicort.    She remains on 3 L supplemental oxygen and has still not experienced any desaturations.    I checked labs at her last visit including a CBC, CMP, and BNP.  Her BNP was mildly elevated at 247. She has CHF.  Her PCP thought some of her complaints could be related to this and increased her diuretics.  She reports that she developed \"kidney issues\" including incontinence.  She has not been taking her full dose of Lasix due to this.  She was actually the hospital yesterday where she had a CT of the abdomen and pelvis performed.  Apparently she's been having some hematuria.  She was prescribed antibiotics for a UTI and referred to nephrology.  She is supposed to make the nephrology appointment within a few days but she states that she might put this off until the first of the month for financial concerns.        Patient Active Problem List   Diagnosis   • Severe chronic obstructive pulmonary disease   • Esophageal dysmotility   • Atopic rhinitis   • Diastolic dysfunction   • Acute bronchitis   • Anxiety   • Depression   • Atypical chest pain   • Congestive heart failure   • Dyslipidemia   • Hypertension   • Hypothyroidism   • Osteoarthritis   • Dependence on supplemental oxygen   • Pulmonary HTN       No Known Allergies    Current " Outpatient Prescriptions:   •  acetaminophen (TYLENOL) 325 MG tablet, Take 650 mg by mouth every 6 (six) hours as needed for mild pain (1-3)., Disp: , Rfl:   •  albuterol (PROAIR HFA) 108 (90 BASE) MCG/ACT inhaler, Inhale 2 puffs As Needed for Wheezing., Disp: , Rfl:   •  albuterol (PROVENTIL) (2.5 MG/3ML) 0.083% nebulizer solution, IHALE ONE VIAL  VIA NEBULIZER EVERY 4 HOURS AS NEEDED FOR WHEEZING, Disp: 120 mL, Rfl: 11  •  aspirin 81 MG EC tablet, Take 81 mg by mouth daily., Disp: , Rfl:   •  busPIRone (BUSPAR) 10 MG tablet, Take 10 mg by mouth 3 (three) times a day., Disp: , Rfl:   •  fluorouracil (EFUDEX) 5 % cream, Apply  topically 2 (two) times a day., Disp: , Rfl:   •  fluticasone (FLONASE) 50 MCG/ACT nasal spray, Daily., Disp: , Rfl:   •  Fluticasone Furoate-Vilanterol (BREO ELLIPTA) 100-25 MCG/INH aerosol powder , Inhale., Disp: , Rfl:   •  furosemide (LASIX) 40 MG tablet, 1 tablet Daily., Disp: , Rfl:   •  gabapentin (NEURONTIN) 800 MG tablet, daily., Disp: , Rfl:   •  hydrOXYzine (ATARAX) 50 MG tablet, Take 50 mg by mouth As Needed for Itching., Disp: , Rfl:   •  levocetirizine (XYZAL) 5 MG tablet, Take 5 mg by mouth every evening., Disp: , Rfl:   •  levothyroxine (SYNTHROID, LEVOTHROID) 50 MCG tablet, Take 50 mcg by mouth daily., Disp: , Rfl:   •  losartan (COZAAR) 50 MG tablet, Take 50 mg by mouth daily., Disp: , Rfl:   •  meloxicam (MOBIC) 7.5 MG tablet, Take 7.5 mg by mouth Daily., Disp: , Rfl:   •  metoprolol succinate XL (TOPROL-XL) 50 MG 24 hr tablet, Take 50 mg by mouth daily., Disp: , Rfl:   •  montelukast (SINGULAIR) 10 MG tablet, Take 10 mg by mouth every night., Disp: , Rfl:   •  O2 (OXYGEN), Inhale 3 L/min daily., Disp: , Rfl:   •  oxybutynin (DITROPAN) 5 MG tablet, 1 tablet 3 (Three) Times a Day., Disp: , Rfl:   •  pantoprazole (PROTONIX) 40 MG EC tablet, Take 40 mg by mouth daily., Disp: , Rfl:   •  pravastatin (PRAVACHOL) 40 MG tablet, Take 40 mg by mouth daily., Disp: , Rfl:   •   "predniSONE (DELTASONE) 10 MG tablet, Take 4 tabs daily x 4 days, then take 3 daily x 3 days, then take 2 daily for 2 days, then take 1 daily x 2 days, Disp: 31 tablet, Rfl: 0  •  spironolactone (ALDACTONE) 25 MG tablet, Take 25 mg by mouth daily., Disp: , Rfl:   •  Umeclidinium Bromide 62.5 MCG/INH aerosol powder , Inhale 1 puff Daily. 1 inhalation once a day, Disp: 1 each, Rfl: 5  MEDICATION LIST AND ALLERGIES REVIEWED.    Social History   Substance Use Topics   • Smoking status: Former Smoker     Packs/day: 2.00     Years: 30.00     Types: Cigarettes     Start date: 1972     Quit date: 2002   • Smokeless tobacco: None   • Alcohol use 1.2 oz/week     2 Cans of beer per week      Comment: daily.       FAMILY AND SOCIAL HISTORY REVIEWED.    Review of Systems   Constitutional: Negative for chills, fever and unexpected weight change.   HENT: Negative for congestion, nosebleeds, postnasal drip, rhinorrhea, sinus pressure and trouble swallowing.    Respiratory: Positive for shortness of breath. Negative for cough, chest tightness and wheezing.    Cardiovascular: Positive for leg swelling. Negative for chest pain.   Gastrointestinal: Negative for abdominal pain, constipation, diarrhea, nausea and vomiting.   Genitourinary: Positive for hematuria and urgency. Negative for dysuria and frequency.   Musculoskeletal: Negative for myalgias.   Neurological: Negative for dizziness, weakness, numbness and headaches.   All other systems reviewed and are negative.  .    /70  Pulse 62  Temp 96.3 °F (35.7 °C)  Resp 20  Ht 60.5\" (153.7 cm)  Wt 181 lb (82.1 kg)  SpO2 95% Comment: 3 L continuous  PF (!) 3 L/min  BMI 34.77 kg/m2    Physical Exam   Constitutional: She appears well-developed. No distress.   Cardiovascular: Normal rate, regular rhythm and normal heart sounds.    No murmur heard.  Pulmonary/Chest: Effort normal. No respiratory distress. She has wheezes. She has no rales.   Musculoskeletal: She exhibits edema " (mild BLE edema). She exhibits no tenderness.   In a wheelchair   Neurological: She is alert.   Skin: Skin is warm and dry. No erythema.   Psychiatric: She has a normal mood and affect. Her behavior is normal.   Vitals reviewed.        RESULTS    Lab Results   Component Value Date    WBC 5.53 10/10/2017    HGB 11.9 10/10/2017    HCT 35.0 10/10/2017    MCV 93.8 10/10/2017     10/10/2017       Lab Results   Component Value Date    GLUCOSE 89 10/10/2017    BUN 14 10/10/2017    CREATININE 0.70 10/10/2017    EGFRIFNONA 82 10/10/2017    BCR 20.0 10/10/2017    K 4.3 10/10/2017    CO2 30.0 10/10/2017    CALCIUM 9.0 10/10/2017    ALBUMIN 4.40 10/10/2017    LABIL2 1.8 10/10/2017    AST 30 10/10/2017    ALT 22 10/10/2017         PROBLEM LIST    Problem List Items Addressed This Visit        Cardiovascular and Mediastinum    Congestive heart failure       Respiratory    Severe chronic obstructive pulmonary disease - Primary    Overview     Description: A.  Emphysema with severe physiologic airway obstruction.  B.  Dyspnea and hypoxia, on 4 L oxygen at rest and 6 L with activity.  C.  Former smoker.            Other    Dependence on supplemental oxygen    Overview     · A.  4 L at rest and up to 6 with exertion.                    DISCUSSION    We will order her nebulized Brovana and Pulmicort through a DME company.  She is concerned about cost.  Hopefully we will be able to get this affordably through Medicare part B coverage, but if she is unable to afford it and I have instructed her to just continue using Breo.  She should also continue Incruse.    I encouraged her to follow up with nephrology as recommended.  She is to be evaluated for her hematuria and see if she can resume her full Lasix dose.    Follow up as scheduled in January with Gilda.    Michelle Figueroa, IRVING  10/24/64941:20 PM  Electronically signed     Please note that portions of this note were completed with a voice recognition program. Efforts were  made to edit the dictations, but occasionally words are mistranscribed.      CC: Juliette Rodríguez MD

## 2017-12-20 ENCOUNTER — HOSPITAL ENCOUNTER (INPATIENT)
Facility: HOSPITAL | Age: 72
LOS: 7 days | Discharge: NURSING, REHAB OR LONG-TERM CARE | End: 2017-12-27
Attending: EMERGENCY MEDICINE | Admitting: INTERNAL MEDICINE

## 2017-12-20 ENCOUNTER — APPOINTMENT (OUTPATIENT)
Dept: GENERAL RADIOLOGY | Facility: HOSPITAL | Age: 72
End: 2017-12-20

## 2017-12-20 DIAGNOSIS — J96.00 ACUTE RESPIRATORY FAILURE, UNSPECIFIED WHETHER WITH HYPOXIA OR HYPERCAPNIA (HCC): Primary | ICD-10-CM

## 2017-12-20 DIAGNOSIS — J44.9 COPD, SEVERE (HCC): ICD-10-CM

## 2017-12-20 DIAGNOSIS — I27.21 SECONDARY PULMONARY ARTERIAL HYPERTENSION (HCC): ICD-10-CM

## 2017-12-20 DIAGNOSIS — J44.1 COPD EXACERBATION (HCC): ICD-10-CM

## 2017-12-20 DIAGNOSIS — Z78.9 IMPAIRED MOBILITY AND ADLS: ICD-10-CM

## 2017-12-20 DIAGNOSIS — Z74.09 IMPAIRED FUNCTIONAL MOBILITY, BALANCE, GAIT, AND ENDURANCE: ICD-10-CM

## 2017-12-20 DIAGNOSIS — Z74.09 IMPAIRED MOBILITY AND ADLS: ICD-10-CM

## 2017-12-20 DIAGNOSIS — Z86.79 HX OF CONGESTIVE HEART FAILURE: ICD-10-CM

## 2017-12-20 PROBLEM — N30.00 ACUTE CYSTITIS WITHOUT HEMATURIA: Status: ACTIVE | Noted: 2017-12-20

## 2017-12-20 PROBLEM — J96.01 ACUTE RESPIRATORY FAILURE WITH HYPOXIA (HCC): Status: ACTIVE | Noted: 2017-12-20

## 2017-12-20 LAB
ALBUMIN SERPL-MCNC: 4.2 G/DL (ref 3.2–4.8)
ALBUMIN/GLOB SERPL: 1.5 G/DL (ref 1.5–2.5)
ALP SERPL-CCNC: 63 U/L (ref 25–100)
ALT SERPL W P-5'-P-CCNC: 24 U/L (ref 7–40)
ANION GAP SERPL CALCULATED.3IONS-SCNC: 12 MMOL/L (ref 3–11)
ARTERIAL PATENCY WRIST A: ABNORMAL
AST SERPL-CCNC: 34 U/L (ref 0–33)
ATMOSPHERIC PRESS: ABNORMAL MMHG
BACTERIA UR QL AUTO: ABNORMAL /HPF
BASE EXCESS BLDA CALC-SCNC: 2.4 MMOL/L (ref 0–2)
BASOPHILS # BLD AUTO: 0.04 10*3/MM3 (ref 0–0.2)
BASOPHILS NFR BLD AUTO: 0.6 % (ref 0–1)
BDY SITE: ABNORMAL
BILIRUB SERPL-MCNC: 0.8 MG/DL (ref 0.3–1.2)
BILIRUB UR QL STRIP: NEGATIVE
BNP SERPL-MCNC: 338 PG/ML (ref 0–100)
BUN BLD-MCNC: 9 MG/DL (ref 9–23)
BUN/CREAT SERPL: 15 (ref 7–25)
CALCIUM SPEC-SCNC: 9 MG/DL (ref 8.7–10.4)
CHLORIDE SERPL-SCNC: 86 MMOL/L (ref 99–109)
CLARITY UR: ABNORMAL
CO2 BLDA-SCNC: 28 MMOL/L (ref 22–33)
CO2 SERPL-SCNC: 24 MMOL/L (ref 20–31)
COHGB MFR BLD: 1.8 % (ref 0–2)
COLOR UR: YELLOW
CREAT BLD-MCNC: 0.6 MG/DL (ref 0.6–1.3)
D-LACTATE SERPL-SCNC: 1.4 MMOL/L (ref 0.5–2)
DEPRECATED RDW RBC AUTO: 48.4 FL (ref 37–54)
EOSINOPHIL # BLD AUTO: 0.19 10*3/MM3 (ref 0–0.3)
EOSINOPHIL NFR BLD AUTO: 2.7 % (ref 0–3)
ERYTHROCYTE [DISTWIDTH] IN BLOOD BY AUTOMATED COUNT: 14.1 % (ref 11.3–14.5)
FLUAV AG NPH QL: NEGATIVE
FLUBV AG NPH QL IA: NEGATIVE
GFR SERPL CREATININE-BSD FRML MDRD: 99 ML/MIN/1.73
GLOBULIN UR ELPH-MCNC: 2.8 GM/DL
GLUCOSE BLD-MCNC: 94 MG/DL (ref 70–100)
GLUCOSE UR STRIP-MCNC: NEGATIVE MG/DL
HCO3 BLDA-SCNC: 26.8 MMOL/L (ref 20–26)
HCT VFR BLD AUTO: 33.3 % (ref 34.5–44)
HCT VFR BLD CALC: 32.2 %
HGB BLD-MCNC: 11.1 G/DL (ref 11.5–15.5)
HGB BLDA-MCNC: 10.5 G/DL (ref 14–18)
HGB UR QL STRIP.AUTO: NEGATIVE
HOLD SPECIMEN: NORMAL
HOLD SPECIMEN: NORMAL
HOROWITZ INDEX BLD+IHG-RTO: 90 %
HYALINE CASTS UR QL AUTO: ABNORMAL /LPF
IMM GRANULOCYTES # BLD: 0.02 10*3/MM3 (ref 0–0.03)
IMM GRANULOCYTES NFR BLD: 0.3 % (ref 0–0.6)
KETONES UR QL STRIP: NEGATIVE
LEUKOCYTE ESTERASE UR QL STRIP.AUTO: ABNORMAL
LYMPHOCYTES # BLD AUTO: 0.75 10*3/MM3 (ref 0.6–4.8)
LYMPHOCYTES NFR BLD AUTO: 10.6 % (ref 24–44)
MCH RBC QN AUTO: 31.2 PG (ref 27–31)
MCHC RBC AUTO-ENTMCNC: 33.3 G/DL (ref 32–36)
MCV RBC AUTO: 93.5 FL (ref 80–99)
METHGB BLD QL: 1.4 % (ref 0–1.5)
MODALITY: ABNORMAL
MONOCYTES # BLD AUTO: 0.56 10*3/MM3 (ref 0–1)
MONOCYTES NFR BLD AUTO: 7.9 % (ref 0–12)
NEUTROPHILS # BLD AUTO: 5.5 10*3/MM3 (ref 1.5–8.3)
NEUTROPHILS NFR BLD AUTO: 77.9 % (ref 41–71)
NITRITE UR QL STRIP: NEGATIVE
OXYHGB MFR BLDV: 96.3 % (ref 94–99)
PCO2 BLDA: 39.8 MM HG (ref 35–45)
PH BLDA: 7.44 PH UNITS (ref 7.35–7.45)
PH UR STRIP.AUTO: 7.5 [PH] (ref 5–8)
PLATELET # BLD AUTO: 228 10*3/MM3 (ref 150–450)
PMV BLD AUTO: 8.4 FL (ref 6–12)
PO2 BLDA: 179 MM HG (ref 83–108)
POTASSIUM BLD-SCNC: 4.5 MMOL/L (ref 3.5–5.5)
PROT SERPL-MCNC: 7 G/DL (ref 5.7–8.2)
PROT UR QL STRIP: NEGATIVE
RBC # BLD AUTO: 3.56 10*6/MM3 (ref 3.89–5.14)
RBC # UR: ABNORMAL /HPF
REF LAB TEST METHOD: ABNORMAL
SODIUM BLD-SCNC: 122 MMOL/L (ref 132–146)
SP GR UR STRIP: 1.01 (ref 1–1.03)
SQUAMOUS #/AREA URNS HPF: ABNORMAL /HPF
TROPONIN I SERPL-MCNC: 0 NG/ML (ref 0–0.07)
TROPONIN I SERPL-MCNC: 0 NG/ML (ref 0–0.07)
UROBILINOGEN UR QL STRIP: ABNORMAL
WBC NRBC COR # BLD: 7.06 10*3/MM3 (ref 3.5–10.8)
WBC UR QL AUTO: ABNORMAL /HPF
WHOLE BLOOD HOLD SPECIMEN: NORMAL
WHOLE BLOOD HOLD SPECIMEN: NORMAL

## 2017-12-20 PROCEDURE — 85025 COMPLETE CBC W/AUTO DIFF WBC: CPT | Performed by: NURSE PRACTITIONER

## 2017-12-20 PROCEDURE — 25010000002 CEFTRIAXONE PER 250 MG: Performed by: NURSE PRACTITIONER

## 2017-12-20 PROCEDURE — A9270 NON-COVERED ITEM OR SERVICE: HCPCS | Performed by: FAMILY MEDICINE

## 2017-12-20 PROCEDURE — 71010 HC CHEST PA OR AP: CPT

## 2017-12-20 PROCEDURE — 80053 COMPREHEN METABOLIC PANEL: CPT | Performed by: NURSE PRACTITIONER

## 2017-12-20 PROCEDURE — 87086 URINE CULTURE/COLONY COUNT: CPT | Performed by: EMERGENCY MEDICINE

## 2017-12-20 PROCEDURE — 63710000001 MONTELUKAST 10 MG TABLET: Performed by: FAMILY MEDICINE

## 2017-12-20 PROCEDURE — 36600 WITHDRAWAL OF ARTERIAL BLOOD: CPT | Performed by: NURSE PRACTITIONER

## 2017-12-20 PROCEDURE — 63710000001 METOPROLOL TARTRATE 25 MG TABLET: Performed by: FAMILY MEDICINE

## 2017-12-20 PROCEDURE — 83880 ASSAY OF NATRIURETIC PEPTIDE: CPT | Performed by: NURSE PRACTITIONER

## 2017-12-20 PROCEDURE — 87804 INFLUENZA ASSAY W/OPTIC: CPT | Performed by: NURSE PRACTITIONER

## 2017-12-20 PROCEDURE — 25010000002 FUROSEMIDE PER 20 MG: Performed by: NURSE PRACTITIONER

## 2017-12-20 PROCEDURE — 93005 ELECTROCARDIOGRAM TRACING: CPT | Performed by: EMERGENCY MEDICINE

## 2017-12-20 PROCEDURE — 63710000001 BUSPIRONE 10 MG TABLET: Performed by: FAMILY MEDICINE

## 2017-12-20 PROCEDURE — 83605 ASSAY OF LACTIC ACID: CPT | Performed by: NURSE PRACTITIONER

## 2017-12-20 PROCEDURE — 81001 URINALYSIS AUTO W/SCOPE: CPT | Performed by: EMERGENCY MEDICINE

## 2017-12-20 PROCEDURE — 99223 1ST HOSP IP/OBS HIGH 75: CPT | Performed by: FAMILY MEDICINE

## 2017-12-20 PROCEDURE — 99285 EMERGENCY DEPT VISIT HI MDM: CPT

## 2017-12-20 PROCEDURE — 82805 BLOOD GASES W/O2 SATURATION: CPT | Performed by: NURSE PRACTITIONER

## 2017-12-20 PROCEDURE — 87040 BLOOD CULTURE FOR BACTERIA: CPT | Performed by: NURSE PRACTITIONER

## 2017-12-20 PROCEDURE — 63710000001 GABAPENTIN 400 MG CAPSULE: Performed by: FAMILY MEDICINE

## 2017-12-20 PROCEDURE — 25010000002 METHYLPREDNISOLONE PER 125 MG: Performed by: FAMILY MEDICINE

## 2017-12-20 PROCEDURE — 84484 ASSAY OF TROPONIN QUANT: CPT

## 2017-12-20 PROCEDURE — 63710000001 DOXYCYCLINE 100 MG CAPSULE: Performed by: FAMILY MEDICINE

## 2017-12-20 RX ORDER — HYDROXYZINE HYDROCHLORIDE 25 MG/1
50 TABLET, FILM COATED ORAL EVERY 8 HOURS PRN
Status: DISCONTINUED | OUTPATIENT
Start: 2017-12-20 | End: 2017-12-21

## 2017-12-20 RX ORDER — ACETAMINOPHEN 325 MG/1
650 TABLET ORAL EVERY 6 HOURS PRN
Status: DISCONTINUED | OUTPATIENT
Start: 2017-12-20 | End: 2017-12-27 | Stop reason: HOSPADM

## 2017-12-20 RX ORDER — METHYLPREDNISOLONE SODIUM SUCCINATE 125 MG/2ML
30 INJECTION, POWDER, LYOPHILIZED, FOR SOLUTION INTRAMUSCULAR; INTRAVENOUS EVERY 12 HOURS
Status: DISCONTINUED | OUTPATIENT
Start: 2017-12-20 | End: 2017-12-23

## 2017-12-20 RX ORDER — LOSARTAN POTASSIUM 25 MG/1
50 TABLET ORAL DAILY
Status: DISCONTINUED | OUTPATIENT
Start: 2017-12-21 | End: 2017-12-24

## 2017-12-20 RX ORDER — HYDROCODONE BITARTRATE AND ACETAMINOPHEN 5; 325 MG/1; MG/1
1 TABLET ORAL EVERY 4 HOURS PRN
Status: DISCONTINUED | OUTPATIENT
Start: 2017-12-20 | End: 2017-12-27 | Stop reason: HOSPADM

## 2017-12-20 RX ORDER — IPRATROPIUM BROMIDE AND ALBUTEROL SULFATE 2.5; .5 MG/3ML; MG/3ML
3 SOLUTION RESPIRATORY (INHALATION) EVERY 4 HOURS PRN
Status: DISCONTINUED | OUTPATIENT
Start: 2017-12-20 | End: 2017-12-27 | Stop reason: HOSPADM

## 2017-12-20 RX ORDER — BUDESONIDE AND FORMOTEROL FUMARATE DIHYDRATE 80; 4.5 UG/1; UG/1
2 AEROSOL RESPIRATORY (INHALATION)
Status: DISCONTINUED | OUTPATIENT
Start: 2017-12-20 | End: 2017-12-27 | Stop reason: HOSPADM

## 2017-12-20 RX ORDER — ASPIRIN 81 MG/1
81 TABLET ORAL DAILY
Status: DISCONTINUED | OUTPATIENT
Start: 2017-12-21 | End: 2017-12-27 | Stop reason: HOSPADM

## 2017-12-20 RX ORDER — ECHINACEA PURPUREA EXTRACT 125 MG
2 TABLET ORAL ONCE
Status: COMPLETED | OUTPATIENT
Start: 2017-12-20 | End: 2017-12-20

## 2017-12-20 RX ORDER — FUROSEMIDE 10 MG/ML
40 INJECTION INTRAMUSCULAR; INTRAVENOUS ONCE
Status: COMPLETED | OUTPATIENT
Start: 2017-12-20 | End: 2017-12-20

## 2017-12-20 RX ORDER — GABAPENTIN 400 MG/1
800 CAPSULE ORAL EVERY 8 HOURS SCHEDULED
Status: DISCONTINUED | OUTPATIENT
Start: 2017-12-20 | End: 2017-12-27 | Stop reason: HOSPADM

## 2017-12-20 RX ORDER — MELOXICAM 7.5 MG/1
15 TABLET ORAL DAILY
Status: DISCONTINUED | OUTPATIENT
Start: 2017-12-21 | End: 2017-12-27 | Stop reason: HOSPADM

## 2017-12-20 RX ORDER — IPRATROPIUM BROMIDE AND ALBUTEROL SULFATE 2.5; .5 MG/3ML; MG/3ML
3 SOLUTION RESPIRATORY (INHALATION)
Status: DISCONTINUED | OUTPATIENT
Start: 2017-12-20 | End: 2017-12-23

## 2017-12-20 RX ORDER — MONTELUKAST SODIUM 10 MG/1
10 TABLET ORAL NIGHTLY
Status: DISCONTINUED | OUTPATIENT
Start: 2017-12-20 | End: 2017-12-27 | Stop reason: HOSPADM

## 2017-12-20 RX ORDER — CETIRIZINE HYDROCHLORIDE 10 MG/1
10 TABLET ORAL DAILY
Status: DISCONTINUED | OUTPATIENT
Start: 2017-12-21 | End: 2017-12-27 | Stop reason: HOSPADM

## 2017-12-20 RX ORDER — METOPROLOL TARTRATE 50 MG/1
50 TABLET, FILM COATED ORAL DAILY
COMMUNITY
End: 2017-12-27 | Stop reason: HOSPADM

## 2017-12-20 RX ORDER — OXYMETAZOLINE HYDROCHLORIDE 0.05 G/100ML
1 SPRAY NASAL ONCE
Status: COMPLETED | OUTPATIENT
Start: 2017-12-20 | End: 2017-12-20

## 2017-12-20 RX ORDER — FUROSEMIDE 40 MG/1
40 TABLET ORAL DAILY
Status: DISCONTINUED | OUTPATIENT
Start: 2017-12-21 | End: 2017-12-27 | Stop reason: HOSPADM

## 2017-12-20 RX ORDER — ATORVASTATIN CALCIUM 10 MG/1
10 TABLET, FILM COATED ORAL DAILY
Status: DISCONTINUED | OUTPATIENT
Start: 2017-12-21 | End: 2017-12-27 | Stop reason: HOSPADM

## 2017-12-20 RX ORDER — DOXYCYCLINE HYCLATE 100 MG/1
100 CAPSULE ORAL EVERY 12 HOURS
Status: COMPLETED | OUTPATIENT
Start: 2017-12-20 | End: 2017-12-25

## 2017-12-20 RX ORDER — BUSPIRONE HYDROCHLORIDE 10 MG/1
10 TABLET ORAL EVERY 8 HOURS SCHEDULED
Status: DISCONTINUED | OUTPATIENT
Start: 2017-12-20 | End: 2017-12-27 | Stop reason: HOSPADM

## 2017-12-20 RX ORDER — ONDANSETRON 2 MG/ML
4 INJECTION INTRAMUSCULAR; INTRAVENOUS EVERY 6 HOURS PRN
Status: DISCONTINUED | OUTPATIENT
Start: 2017-12-20 | End: 2017-12-27 | Stop reason: HOSPADM

## 2017-12-20 RX ORDER — LIDOCAINE HYDROCHLORIDE 40 MG/ML
1 SOLUTION TOPICAL ONCE
Status: COMPLETED | OUTPATIENT
Start: 2017-12-20 | End: 2017-12-20

## 2017-12-20 RX ORDER — LEVOTHYROXINE SODIUM 0.03 MG/1
50 TABLET ORAL
Status: DISCONTINUED | OUTPATIENT
Start: 2017-12-21 | End: 2017-12-27 | Stop reason: HOSPADM

## 2017-12-20 RX ORDER — SODIUM CHLORIDE 0.9 % (FLUSH) 0.9 %
1-10 SYRINGE (ML) INJECTION AS NEEDED
Status: DISCONTINUED | OUTPATIENT
Start: 2017-12-20 | End: 2017-12-27 | Stop reason: HOSPADM

## 2017-12-20 RX ORDER — SODIUM CHLORIDE 0.9 % (FLUSH) 0.9 %
10 SYRINGE (ML) INJECTION AS NEEDED
Status: DISCONTINUED | OUTPATIENT
Start: 2017-12-20 | End: 2017-12-27 | Stop reason: HOSPADM

## 2017-12-20 RX ORDER — CEFTRIAXONE SODIUM 1 G/50ML
1 INJECTION, SOLUTION INTRAVENOUS ONCE
Status: COMPLETED | OUTPATIENT
Start: 2017-12-20 | End: 2017-12-20

## 2017-12-20 RX ORDER — PANTOPRAZOLE SODIUM 40 MG/1
40 TABLET, DELAYED RELEASE ORAL EVERY MORNING
Status: DISCONTINUED | OUTPATIENT
Start: 2017-12-21 | End: 2017-12-27 | Stop reason: HOSPADM

## 2017-12-20 RX ADMIN — ACETAMINOPHEN 650 MG: 325 TABLET ORAL at 21:11

## 2017-12-20 RX ADMIN — LIDOCAINE HYDROCHLORIDE 1 APPLICATION: 40 SOLUTION TOPICAL at 15:00

## 2017-12-20 RX ADMIN — MONTELUKAST SODIUM 10 MG: 10 TABLET, FILM COATED ORAL at 23:39

## 2017-12-20 RX ADMIN — DOXYCYCLINE HYCLATE 100 MG: 100 CAPSULE ORAL at 23:39

## 2017-12-20 RX ADMIN — SALINE NASAL SPRAY 2 SPRAY: 1.5 SOLUTION NASAL at 15:00

## 2017-12-20 RX ADMIN — METHYLPREDNISOLONE SODIUM SUCCINATE 30 MG: 125 INJECTION, POWDER, FOR SOLUTION INTRAMUSCULAR; INTRAVENOUS at 23:41

## 2017-12-20 RX ADMIN — OXYMETAZOLINE HYDROCHLORIDE 1 SPRAY: 5 SPRAY NASAL at 15:00

## 2017-12-20 RX ADMIN — METOPROLOL TARTRATE 25 MG: 25 TABLET ORAL at 23:39

## 2017-12-20 RX ADMIN — GABAPENTIN 800 MG: 400 CAPSULE ORAL at 23:39

## 2017-12-20 RX ADMIN — BUSPIRONE HYDROCHLORIDE 10 MG: 10 TABLET ORAL at 23:39

## 2017-12-20 RX ADMIN — CEFTRIAXONE SODIUM 1 G: 1 INJECTION, SOLUTION INTRAVENOUS at 15:27

## 2017-12-20 RX ADMIN — FUROSEMIDE 40 MG: 10 INJECTION, SOLUTION INTRAMUSCULAR; INTRAVENOUS at 16:20

## 2017-12-21 ENCOUNTER — APPOINTMENT (OUTPATIENT)
Dept: GENERAL RADIOLOGY | Facility: HOSPITAL | Age: 72
End: 2017-12-21
Attending: FAMILY MEDICINE

## 2017-12-21 ENCOUNTER — APPOINTMENT (OUTPATIENT)
Dept: CARDIOLOGY | Facility: HOSPITAL | Age: 72
End: 2017-12-21
Attending: FAMILY MEDICINE

## 2017-12-21 LAB
ANION GAP SERPL CALCULATED.3IONS-SCNC: 10 MMOL/L (ref 3–11)
ARTICHOKE IGE QN: 77 MG/DL (ref 0–130)
BASOPHILS # BLD AUTO: 0.01 10*3/MM3 (ref 0–0.2)
BASOPHILS NFR BLD AUTO: 0.1 % (ref 0–1)
BH CV ECHO MEAS - AI DEC SLOPE: 396 CM/SEC^2
BH CV ECHO MEAS - AI MAX PG: 92.5 MMHG
BH CV ECHO MEAS - AI MAX VEL: 481 CM/SEC
BH CV ECHO MEAS - AI P1/2T: 355.8 MSEC
BH CV ECHO MEAS - AO MAX PG (FULL): 3.8 MMHG
BH CV ECHO MEAS - AO MAX PG: 11 MMHG
BH CV ECHO MEAS - AO MEAN PG (FULL): 2.7 MMHG
BH CV ECHO MEAS - AO MEAN PG: 5.7 MMHG
BH CV ECHO MEAS - AO ROOT AREA (BSA CORRECTED): 1.6
BH CV ECHO MEAS - AO ROOT AREA: 7.1 CM^2
BH CV ECHO MEAS - AO ROOT DIAM: 3 CM
BH CV ECHO MEAS - AO V2 MAX: 163.3 CM/SEC
BH CV ECHO MEAS - AO V2 MEAN: 110 CM/SEC
BH CV ECHO MEAS - AO V2 VTI: 31.5 CM
BH CV ECHO MEAS - AVA(I,A): 2.7 CM^2
BH CV ECHO MEAS - AVA(I,D): 2.7 CM^2
BH CV ECHO MEAS - AVA(V,A): 2.6 CM^2
BH CV ECHO MEAS - AVA(V,D): 2.6 CM^2
BH CV ECHO MEAS - BSA(HAYCOCK): 2 M^2
BH CV ECHO MEAS - BSA: 1.9 M^2
BH CV ECHO MEAS - BZI_BMI: 29.8 KILOGRAMS/M^2
BH CV ECHO MEAS - BZI_METRIC_HEIGHT: 165.1 CM
BH CV ECHO MEAS - BZI_METRIC_WEIGHT: 81.2 KG
BH CV ECHO MEAS - EDV(CUBED): 45.1 ML
BH CV ECHO MEAS - EDV(TEICH): 53 ML
BH CV ECHO MEAS - EF(CUBED): 60.6 %
BH CV ECHO MEAS - EF(TEICH): 53.1 %
BH CV ECHO MEAS - ESV(CUBED): 17.8 ML
BH CV ECHO MEAS - ESV(TEICH): 24.8 ML
BH CV ECHO MEAS - FS: 26.7 %
BH CV ECHO MEAS - IVS/LVPW: 0.93
BH CV ECHO MEAS - IVSD: 1.3 CM
BH CV ECHO MEAS - LA DIMENSION: 3.3 CM
BH CV ECHO MEAS - LA/AO: 1.1
BH CV ECHO MEAS - LAT PEAK E' VEL: 8.3 CM/SEC
BH CV ECHO MEAS - LV MASS(C)D: 167.2 GRAMS
BH CV ECHO MEAS - LV MASS(C)DI: 88.6 GRAMS/M^2
BH CV ECHO MEAS - LV MAX PG: 7.2 MMHG
BH CV ECHO MEAS - LV MEAN PG: 3 MMHG
BH CV ECHO MEAS - LV V1 MAX: 134 CM/SEC
BH CV ECHO MEAS - LV V1 MEAN: 86.4 CM/SEC
BH CV ECHO MEAS - LV V1 VTI: 26.9 CM
BH CV ECHO MEAS - LVIDD: 3.6 CM
BH CV ECHO MEAS - LVIDS: 2.6 CM
BH CV ECHO MEAS - LVOT AREA (M): 3.1 CM^2
BH CV ECHO MEAS - LVOT AREA: 3.1 CM^2
BH CV ECHO MEAS - LVOT DIAM: 2 CM
BH CV ECHO MEAS - LVPWD: 1.4 CM
BH CV ECHO MEAS - MED PEAK E' VEL: 8.1 CM/SEC
BH CV ECHO MEAS - MV A MAX VEL: 115 CM/SEC
BH CV ECHO MEAS - MV E MAX VEL: 69.6 CM/SEC
BH CV ECHO MEAS - MV E/A: 0.61
BH CV ECHO MEAS - MV MAX PG: 6.9 MMHG
BH CV ECHO MEAS - MV MEAN PG: 3 MMHG
BH CV ECHO MEAS - MV V2 MAX: 131 CM/SEC
BH CV ECHO MEAS - MV V2 MEAN: 78.7 CM/SEC
BH CV ECHO MEAS - MV V2 VTI: 18.5 CM
BH CV ECHO MEAS - MVA(VTI): 4.6 CM^2
BH CV ECHO MEAS - PA MAX PG: 5.4 MMHG
BH CV ECHO MEAS - PA V2 MAX: 116 CM/SEC
BH CV ECHO MEAS - RAP SYSTOLE: 3 MMHG
BH CV ECHO MEAS - RVSP: 51 MMHG
BH CV ECHO MEAS - SI(AO): 117.9 ML/M^2
BH CV ECHO MEAS - SI(CUBED): 14.5 ML/M^2
BH CV ECHO MEAS - SI(LVOT): 44.8 ML/M^2
BH CV ECHO MEAS - SI(TEICH): 14.9 ML/M^2
BH CV ECHO MEAS - SV(AO): 222.4 ML
BH CV ECHO MEAS - SV(CUBED): 27.3 ML
BH CV ECHO MEAS - SV(LVOT): 84.5 ML
BH CV ECHO MEAS - SV(TEICH): 28.1 ML
BH CV ECHO MEAS - TAPSE (>1.6): 2.3 CM2
BH CV ECHO MEAS - TR MAX V: 48 MMHG
BH CV ECHO MEAS - TR MAX VEL: 346 CM/SEC
BH CV XLRA - RV BASE: 3.6 CM
BH CV XLRA - RV LENGTH: 8.3 CM
BH CV XLRA - RV MID: 2.2 CM
BH CV XLRA - TDI S': 17.1 CM/SEC
BNP SERPL-MCNC: 402 PG/ML (ref 0–100)
BUN BLD-MCNC: 11 MG/DL (ref 9–23)
BUN/CREAT SERPL: 15.7 (ref 7–25)
CALCIUM SPEC-SCNC: 9.5 MG/DL (ref 8.7–10.4)
CHLORIDE SERPL-SCNC: 88 MMOL/L (ref 99–109)
CHOLEST SERPL-MCNC: 145 MG/DL (ref 0–200)
CO2 SERPL-SCNC: 28 MMOL/L (ref 20–31)
CREAT BLD-MCNC: 0.7 MG/DL (ref 0.6–1.3)
DEPRECATED RDW RBC AUTO: 48.5 FL (ref 37–54)
E/E' RATIO: 8
EOSINOPHIL # BLD AUTO: 0.03 10*3/MM3 (ref 0–0.3)
EOSINOPHIL NFR BLD AUTO: 0.4 % (ref 0–3)
ERYTHROCYTE [DISTWIDTH] IN BLOOD BY AUTOMATED COUNT: 14.1 % (ref 11.3–14.5)
GFR SERPL CREATININE-BSD FRML MDRD: 82 ML/MIN/1.73
GLUCOSE BLD-MCNC: 157 MG/DL (ref 70–100)
HBA1C MFR BLD: 5.6 % (ref 4.8–5.6)
HCT VFR BLD AUTO: 34.5 % (ref 34.5–44)
HDLC SERPL-MCNC: 56 MG/DL (ref 40–60)
HGB BLD-MCNC: 11.8 G/DL (ref 11.5–15.5)
IMM GRANULOCYTES # BLD: 0.03 10*3/MM3 (ref 0–0.03)
IMM GRANULOCYTES NFR BLD: 0.4 % (ref 0–0.6)
LEFT ATRIUM VOLUME INDEX: 44 ML/M2
LEFT ATRIUM VOLUME: 83 CM3
LYMPHOCYTES # BLD AUTO: 0.61 10*3/MM3 (ref 0.6–4.8)
LYMPHOCYTES NFR BLD AUTO: 7.3 % (ref 24–44)
MAXIMAL PREDICTED HEART RATE: 149 BPM
MCH RBC QN AUTO: 32.2 PG (ref 27–31)
MCHC RBC AUTO-ENTMCNC: 34.2 G/DL (ref 32–36)
MCV RBC AUTO: 94.3 FL (ref 80–99)
MONOCYTES # BLD AUTO: 0.1 10*3/MM3 (ref 0–1)
MONOCYTES NFR BLD AUTO: 1.2 % (ref 0–12)
NEUTROPHILS # BLD AUTO: 7.58 10*3/MM3 (ref 1.5–8.3)
NEUTROPHILS NFR BLD AUTO: 90.6 % (ref 41–71)
OSMOLALITY SERPL: 269 MOSM/KG (ref 275–295)
OSMOLALITY UR: 317 MOSM/KG (ref 300–1100)
PLATELET # BLD AUTO: 253 10*3/MM3 (ref 150–450)
PMV BLD AUTO: 8.6 FL (ref 6–12)
POTASSIUM BLD-SCNC: 3.9 MMOL/L (ref 3.5–5.5)
RBC # BLD AUTO: 3.66 10*6/MM3 (ref 3.89–5.14)
SODIUM BLD-SCNC: 126 MMOL/L (ref 132–146)
SODIUM UR-SCNC: 31 MMOL/L (ref 30–90)
STRESS TARGET HR: 127 BPM
TRIGL SERPL-MCNC: 79 MG/DL (ref 0–150)
TSH SERPL DL<=0.05 MIU/L-ACNC: 0.84 MIU/ML (ref 0.35–5.35)
WBC NRBC COR # BLD: 8.36 10*3/MM3 (ref 3.5–10.8)

## 2017-12-21 PROCEDURE — 63710000001 METOPROLOL TARTRATE 25 MG TABLET: Performed by: FAMILY MEDICINE

## 2017-12-21 PROCEDURE — A9270 NON-COVERED ITEM OR SERVICE: HCPCS | Performed by: FAMILY MEDICINE

## 2017-12-21 PROCEDURE — 83935 ASSAY OF URINE OSMOLALITY: CPT | Performed by: FAMILY MEDICINE

## 2017-12-21 PROCEDURE — 25010000002 ENOXAPARIN PER 10 MG: Performed by: FAMILY MEDICINE

## 2017-12-21 PROCEDURE — 93306 TTE W/DOPPLER COMPLETE: CPT | Performed by: INTERNAL MEDICINE

## 2017-12-21 PROCEDURE — 63710000001 LEVOTHYROXINE 25 MCG TABLET: Performed by: FAMILY MEDICINE

## 2017-12-21 PROCEDURE — 94799 UNLISTED PULMONARY SVC/PX: CPT

## 2017-12-21 PROCEDURE — 83930 ASSAY OF BLOOD OSMOLALITY: CPT | Performed by: FAMILY MEDICINE

## 2017-12-21 PROCEDURE — 63710000001 HYDROCODONE-ACETAMINOPHEN 5-325 MG TABLET: Performed by: FAMILY MEDICINE

## 2017-12-21 PROCEDURE — 80048 BASIC METABOLIC PNL TOTAL CA: CPT | Performed by: FAMILY MEDICINE

## 2017-12-21 PROCEDURE — A9270 NON-COVERED ITEM OR SERVICE: HCPCS | Performed by: NURSE PRACTITIONER

## 2017-12-21 PROCEDURE — 93306 TTE W/DOPPLER COMPLETE: CPT

## 2017-12-21 PROCEDURE — 94760 N-INVAS EAR/PLS OXIMETRY 1: CPT

## 2017-12-21 PROCEDURE — 63710000001 PANTOPRAZOLE 40 MG TABLET DELAYED-RELEASE: Performed by: FAMILY MEDICINE

## 2017-12-21 PROCEDURE — 83880 ASSAY OF NATRIURETIC PEPTIDE: CPT | Performed by: FAMILY MEDICINE

## 2017-12-21 PROCEDURE — 94660 CPAP INITIATION&MGMT: CPT

## 2017-12-21 PROCEDURE — 99223 1ST HOSP IP/OBS HIGH 75: CPT | Performed by: INTERNAL MEDICINE

## 2017-12-21 PROCEDURE — 85025 COMPLETE CBC W/AUTO DIFF WBC: CPT | Performed by: FAMILY MEDICINE

## 2017-12-21 PROCEDURE — 94640 AIRWAY INHALATION TREATMENT: CPT

## 2017-12-21 PROCEDURE — 80061 LIPID PANEL: CPT | Performed by: FAMILY MEDICINE

## 2017-12-21 PROCEDURE — 63710000001 HYDROXYZINE 25 MG TABLET: Performed by: NURSE PRACTITIONER

## 2017-12-21 PROCEDURE — 25010000002 HYDRALAZINE PER 20 MG: Performed by: NURSE PRACTITIONER

## 2017-12-21 PROCEDURE — 63710000001 ATORVASTATIN 10 MG TABLET: Performed by: FAMILY MEDICINE

## 2017-12-21 PROCEDURE — 5A09357 ASSISTANCE WITH RESPIRATORY VENTILATION, LESS THAN 24 CONSECUTIVE HOURS, CONTINUOUS POSITIVE AIRWAY PRESSURE: ICD-10-PCS | Performed by: FAMILY MEDICINE

## 2017-12-21 PROCEDURE — 25010000002 CEFTRIAXONE PER 250 MG: Performed by: FAMILY MEDICINE

## 2017-12-21 PROCEDURE — 84443 ASSAY THYROID STIM HORMONE: CPT | Performed by: FAMILY MEDICINE

## 2017-12-21 PROCEDURE — 99233 SBSQ HOSP IP/OBS HIGH 50: CPT | Performed by: FAMILY MEDICINE

## 2017-12-21 PROCEDURE — 63710000001 DOXYCYCLINE 100 MG CAPSULE: Performed by: FAMILY MEDICINE

## 2017-12-21 PROCEDURE — 84300 ASSAY OF URINE SODIUM: CPT | Performed by: FAMILY MEDICINE

## 2017-12-21 PROCEDURE — 63710000001 ASPIRIN 81 MG TABLET DELAYED-RELEASE: Performed by: FAMILY MEDICINE

## 2017-12-21 PROCEDURE — 25010000002 METHYLPREDNISOLONE PER 125 MG: Performed by: FAMILY MEDICINE

## 2017-12-21 PROCEDURE — 63710000001 GABAPENTIN 400 MG CAPSULE: Performed by: FAMILY MEDICINE

## 2017-12-21 PROCEDURE — 63710000001 MELOXICAM 7.5 MG TABLET: Performed by: FAMILY MEDICINE

## 2017-12-21 PROCEDURE — 63710000001 FUROSEMIDE 40 MG TABLET: Performed by: FAMILY MEDICINE

## 2017-12-21 PROCEDURE — 83036 HEMOGLOBIN GLYCOSYLATED A1C: CPT | Performed by: FAMILY MEDICINE

## 2017-12-21 PROCEDURE — 63710000001 CETIRIZINE 10 MG TABLET: Performed by: FAMILY MEDICINE

## 2017-12-21 PROCEDURE — 63710000001 BUSPIRONE 10 MG TABLET: Performed by: FAMILY MEDICINE

## 2017-12-21 PROCEDURE — 71020 HC CHEST PA AND LATERAL: CPT

## 2017-12-21 RX ORDER — SACCHAROMYCES BOULARDII 250 MG
250 CAPSULE ORAL 2 TIMES DAILY
Status: DISCONTINUED | OUTPATIENT
Start: 2017-12-21 | End: 2017-12-27 | Stop reason: HOSPADM

## 2017-12-21 RX ORDER — HYDROXYZINE HYDROCHLORIDE 25 MG/1
25 TABLET, FILM COATED ORAL 3 TIMES DAILY PRN
Status: DISCONTINUED | OUTPATIENT
Start: 2017-12-21 | End: 2017-12-27 | Stop reason: HOSPADM

## 2017-12-21 RX ORDER — ACETAMINOPHEN,DIPHENHYDRAMINE HCL 500; 25 MG/1; MG/1
1 TABLET, FILM COATED ORAL NIGHTLY PRN
COMMUNITY
End: 2018-01-04

## 2017-12-21 RX ORDER — HYDRALAZINE HYDROCHLORIDE 20 MG/ML
10 INJECTION INTRAMUSCULAR; INTRAVENOUS ONCE
Status: COMPLETED | OUTPATIENT
Start: 2017-12-21 | End: 2017-12-21

## 2017-12-21 RX ORDER — ISOSORBIDE MONONITRATE 30 MG/1
30 TABLET, EXTENDED RELEASE ORAL
Status: DISCONTINUED | OUTPATIENT
Start: 2017-12-21 | End: 2017-12-27 | Stop reason: HOSPADM

## 2017-12-21 RX ORDER — CEFTRIAXONE SODIUM 1 G/50ML
1 INJECTION, SOLUTION INTRAVENOUS
Status: DISCONTINUED | OUTPATIENT
Start: 2017-12-21 | End: 2017-12-22

## 2017-12-21 RX ORDER — NYSTATIN 100000 [USP'U]/G
POWDER TOPICAL EVERY 8 HOURS SCHEDULED
Status: DISCONTINUED | OUTPATIENT
Start: 2017-12-21 | End: 2017-12-27 | Stop reason: HOSPADM

## 2017-12-21 RX ORDER — LORAZEPAM 1 MG/1
1 TABLET ORAL EVERY 6 HOURS PRN
Status: DISCONTINUED | OUTPATIENT
Start: 2017-12-21 | End: 2017-12-27 | Stop reason: HOSPADM

## 2017-12-21 RX ADMIN — DOXYCYCLINE HYCLATE 100 MG: 100 CAPSULE ORAL at 09:00

## 2017-12-21 RX ADMIN — METHYLPREDNISOLONE SODIUM SUCCINATE 30 MG: 125 INJECTION, POWDER, FOR SOLUTION INTRAMUSCULAR; INTRAVENOUS at 09:01

## 2017-12-21 RX ADMIN — MONTELUKAST SODIUM 10 MG: 10 TABLET, FILM COATED ORAL at 21:37

## 2017-12-21 RX ADMIN — BUDESONIDE AND FORMOTEROL FUMARATE DIHYDRATE 2 PUFF: 80; 4.5 AEROSOL RESPIRATORY (INHALATION) at 19:29

## 2017-12-21 RX ADMIN — HYDROXYZINE HYDROCHLORIDE 25 MG: 25 TABLET, FILM COATED ORAL at 08:57

## 2017-12-21 RX ADMIN — BUSPIRONE HYDROCHLORIDE 10 MG: 10 TABLET ORAL at 06:40

## 2017-12-21 RX ADMIN — FUROSEMIDE 40 MG: 40 TABLET ORAL at 08:57

## 2017-12-21 RX ADMIN — GABAPENTIN 800 MG: 400 CAPSULE ORAL at 21:32

## 2017-12-21 RX ADMIN — PANTOPRAZOLE SODIUM 40 MG: 40 TABLET, DELAYED RELEASE ORAL at 06:39

## 2017-12-21 RX ADMIN — METOPROLOL TARTRATE 25 MG: 25 TABLET ORAL at 08:57

## 2017-12-21 RX ADMIN — METOPROLOL TARTRATE 25 MG: 25 TABLET ORAL at 21:37

## 2017-12-21 RX ADMIN — LEVOTHYROXINE SODIUM 50 MCG: 25 TABLET ORAL at 06:39

## 2017-12-21 RX ADMIN — GABAPENTIN 800 MG: 400 CAPSULE ORAL at 13:22

## 2017-12-21 RX ADMIN — IPRATROPIUM BROMIDE AND ALBUTEROL SULFATE 3 ML: .5; 3 SOLUTION RESPIRATORY (INHALATION) at 12:46

## 2017-12-21 RX ADMIN — IPRATROPIUM BROMIDE AND ALBUTEROL SULFATE 3 ML: .5; 3 SOLUTION RESPIRATORY (INHALATION) at 15:28

## 2017-12-21 RX ADMIN — GABAPENTIN 800 MG: 400 CAPSULE ORAL at 06:39

## 2017-12-21 RX ADMIN — NYSTATIN: 100000 POWDER TOPICAL at 21:41

## 2017-12-21 RX ADMIN — DOXYCYCLINE HYCLATE 100 MG: 100 CAPSULE ORAL at 21:32

## 2017-12-21 RX ADMIN — ATORVASTATIN CALCIUM 10 MG: 10 TABLET, FILM COATED ORAL at 08:57

## 2017-12-21 RX ADMIN — IPRATROPIUM BROMIDE AND ALBUTEROL SULFATE 3 ML: .5; 3 SOLUTION RESPIRATORY (INHALATION) at 00:15

## 2017-12-21 RX ADMIN — Medication 250 MG: at 21:32

## 2017-12-21 RX ADMIN — IPRATROPIUM BROMIDE AND ALBUTEROL SULFATE 3 ML: .5; 3 SOLUTION RESPIRATORY (INHALATION) at 19:29

## 2017-12-21 RX ADMIN — BUDESONIDE AND FORMOTEROL FUMARATE DIHYDRATE 2 PUFF: 80; 4.5 AEROSOL RESPIRATORY (INHALATION) at 12:47

## 2017-12-21 RX ADMIN — ENOXAPARIN SODIUM 40 MG: 40 INJECTION SUBCUTANEOUS at 06:39

## 2017-12-21 RX ADMIN — IPRATROPIUM BROMIDE AND ALBUTEROL SULFATE 3 ML: .5; 3 SOLUTION RESPIRATORY (INHALATION) at 02:38

## 2017-12-21 RX ADMIN — ASPIRIN 81 MG: 81 TABLET, COATED ORAL at 08:57

## 2017-12-21 RX ADMIN — BUSPIRONE HYDROCHLORIDE 10 MG: 10 TABLET ORAL at 21:32

## 2017-12-21 RX ADMIN — METHYLPREDNISOLONE SODIUM SUCCINATE 30 MG: 125 INJECTION, POWDER, FOR SOLUTION INTRAMUSCULAR; INTRAVENOUS at 21:31

## 2017-12-21 RX ADMIN — HYDRALAZINE HYDROCHLORIDE 10 MG: 20 INJECTION INTRAMUSCULAR; INTRAVENOUS at 02:17

## 2017-12-21 RX ADMIN — IPRATROPIUM BROMIDE AND ALBUTEROL SULFATE 3 ML: .5; 3 SOLUTION RESPIRATORY (INHALATION) at 04:48

## 2017-12-21 RX ADMIN — MELOXICAM 15 MG: 7.5 TABLET ORAL at 08:57

## 2017-12-21 RX ADMIN — HYDROXYZINE HYDROCHLORIDE 25 MG: 25 TABLET, FILM COATED ORAL at 21:37

## 2017-12-21 RX ADMIN — ISOSORBIDE MONONITRATE 30 MG: 30 TABLET, EXTENDED RELEASE ORAL at 14:12

## 2017-12-21 RX ADMIN — CETIRIZINE HYDROCHLORIDE 10 MG: 10 TABLET, FILM COATED ORAL at 08:58

## 2017-12-21 RX ADMIN — HYDROCODONE BITARTRATE AND ACETAMINOPHEN 1 TABLET: 5; 325 TABLET ORAL at 01:25

## 2017-12-21 RX ADMIN — CEFTRIAXONE SODIUM 1 G: 1 INJECTION, SOLUTION INTRAVENOUS at 14:12

## 2017-12-22 LAB
ANION GAP SERPL CALCULATED.3IONS-SCNC: 5 MMOL/L (ref 3–11)
BACTERIA SPEC AEROBE CULT: NORMAL
BACTERIA SPEC AEROBE CULT: NORMAL
BUN BLD-MCNC: 11 MG/DL (ref 9–23)
BUN/CREAT SERPL: 15.7 (ref 7–25)
CALCIUM SPEC-SCNC: 8.9 MG/DL (ref 8.7–10.4)
CHLORIDE SERPL-SCNC: 93 MMOL/L (ref 99–109)
CO2 SERPL-SCNC: 30 MMOL/L (ref 20–31)
CREAT BLD-MCNC: 0.7 MG/DL (ref 0.6–1.3)
GFR SERPL CREATININE-BSD FRML MDRD: 82 ML/MIN/1.73
GLUCOSE BLD-MCNC: 159 MG/DL (ref 70–100)
POTASSIUM BLD-SCNC: 4.2 MMOL/L (ref 3.5–5.5)
SODIUM BLD-SCNC: 128 MMOL/L (ref 132–146)

## 2017-12-22 PROCEDURE — 25010000002 METHYLPREDNISOLONE PER 125 MG: Performed by: FAMILY MEDICINE

## 2017-12-22 PROCEDURE — 94640 AIRWAY INHALATION TREATMENT: CPT

## 2017-12-22 PROCEDURE — 99232 SBSQ HOSP IP/OBS MODERATE 35: CPT | Performed by: FAMILY MEDICINE

## 2017-12-22 PROCEDURE — 94660 CPAP INITIATION&MGMT: CPT

## 2017-12-22 PROCEDURE — 25010000002 ENOXAPARIN PER 10 MG: Performed by: FAMILY MEDICINE

## 2017-12-22 PROCEDURE — 94799 UNLISTED PULMONARY SVC/PX: CPT

## 2017-12-22 PROCEDURE — 99232 SBSQ HOSP IP/OBS MODERATE 35: CPT | Performed by: INTERNAL MEDICINE

## 2017-12-22 PROCEDURE — 80048 BASIC METABOLIC PNL TOTAL CA: CPT | Performed by: FAMILY MEDICINE

## 2017-12-22 PROCEDURE — 25010000002 CEFTRIAXONE PER 250 MG: Performed by: FAMILY MEDICINE

## 2017-12-22 RX ADMIN — METOPROLOL TARTRATE 25 MG: 25 TABLET ORAL at 10:21

## 2017-12-22 RX ADMIN — METOPROLOL TARTRATE 25 MG: 25 TABLET ORAL at 22:43

## 2017-12-22 RX ADMIN — MONTELUKAST SODIUM 10 MG: 10 TABLET, FILM COATED ORAL at 22:42

## 2017-12-22 RX ADMIN — IPRATROPIUM BROMIDE AND ALBUTEROL SULFATE 3 ML: .5; 3 SOLUTION RESPIRATORY (INHALATION) at 23:15

## 2017-12-22 RX ADMIN — CEFTRIAXONE SODIUM 1 G: 1 INJECTION, SOLUTION INTRAVENOUS at 10:29

## 2017-12-22 RX ADMIN — BUSPIRONE HYDROCHLORIDE 10 MG: 10 TABLET ORAL at 05:17

## 2017-12-22 RX ADMIN — IPRATROPIUM BROMIDE AND ALBUTEROL SULFATE 3 ML: .5; 3 SOLUTION RESPIRATORY (INHALATION) at 02:58

## 2017-12-22 RX ADMIN — DOXYCYCLINE HYCLATE 100 MG: 100 CAPSULE ORAL at 22:43

## 2017-12-22 RX ADMIN — Medication 250 MG: at 22:42

## 2017-12-22 RX ADMIN — ISOSORBIDE MONONITRATE 30 MG: 30 TABLET, EXTENDED RELEASE ORAL at 10:21

## 2017-12-22 RX ADMIN — ASPIRIN 81 MG: 81 TABLET, COATED ORAL at 10:20

## 2017-12-22 RX ADMIN — ENOXAPARIN SODIUM 40 MG: 40 INJECTION SUBCUTANEOUS at 05:17

## 2017-12-22 RX ADMIN — IPRATROPIUM BROMIDE AND ALBUTEROL SULFATE 3 ML: .5; 3 SOLUTION RESPIRATORY (INHALATION) at 15:22

## 2017-12-22 RX ADMIN — NYSTATIN: 100000 POWDER TOPICAL at 22:43

## 2017-12-22 RX ADMIN — GABAPENTIN 800 MG: 400 CAPSULE ORAL at 22:44

## 2017-12-22 RX ADMIN — METHYLPREDNISOLONE SODIUM SUCCINATE 30 MG: 125 INJECTION, POWDER, FOR SOLUTION INTRAMUSCULAR; INTRAVENOUS at 10:19

## 2017-12-22 RX ADMIN — LORAZEPAM 1 MG: 1 TABLET ORAL at 22:56

## 2017-12-22 RX ADMIN — FUROSEMIDE 40 MG: 40 TABLET ORAL at 10:20

## 2017-12-22 RX ADMIN — GABAPENTIN 800 MG: 400 CAPSULE ORAL at 05:17

## 2017-12-22 RX ADMIN — LOSARTAN POTASSIUM 50 MG: 25 TABLET, FILM COATED ORAL at 10:20

## 2017-12-22 RX ADMIN — LEVOTHYROXINE SODIUM 50 MCG: 25 TABLET ORAL at 05:17

## 2017-12-22 RX ADMIN — METHYLPREDNISOLONE SODIUM SUCCINATE 30 MG: 125 INJECTION, POWDER, FOR SOLUTION INTRAMUSCULAR; INTRAVENOUS at 22:42

## 2017-12-22 RX ADMIN — IPRATROPIUM BROMIDE AND ALBUTEROL SULFATE 3 ML: .5; 3 SOLUTION RESPIRATORY (INHALATION) at 07:19

## 2017-12-22 RX ADMIN — BUSPIRONE HYDROCHLORIDE 10 MG: 10 TABLET ORAL at 22:43

## 2017-12-22 RX ADMIN — LORAZEPAM 1 MG: 1 TABLET ORAL at 16:27

## 2017-12-22 RX ADMIN — IPRATROPIUM BROMIDE AND ALBUTEROL SULFATE 3 ML: .5; 3 SOLUTION RESPIRATORY (INHALATION) at 00:42

## 2017-12-22 RX ADMIN — BUDESONIDE AND FORMOTEROL FUMARATE DIHYDRATE 2 PUFF: 80; 4.5 AEROSOL RESPIRATORY (INHALATION) at 19:05

## 2017-12-22 RX ADMIN — BUSPIRONE HYDROCHLORIDE 10 MG: 10 TABLET ORAL at 14:31

## 2017-12-22 RX ADMIN — BUDESONIDE AND FORMOTEROL FUMARATE DIHYDRATE 2 PUFF: 80; 4.5 AEROSOL RESPIRATORY (INHALATION) at 07:19

## 2017-12-22 RX ADMIN — IPRATROPIUM BROMIDE AND ALBUTEROL SULFATE 3 ML: .5; 3 SOLUTION RESPIRATORY (INHALATION) at 12:15

## 2017-12-22 RX ADMIN — IPRATROPIUM BROMIDE AND ALBUTEROL SULFATE 3 ML: .5; 3 SOLUTION RESPIRATORY (INHALATION) at 18:47

## 2017-12-22 RX ADMIN — ATORVASTATIN CALCIUM 10 MG: 10 TABLET, FILM COATED ORAL at 10:20

## 2017-12-22 RX ADMIN — GABAPENTIN 800 MG: 400 CAPSULE ORAL at 14:31

## 2017-12-22 RX ADMIN — DOXYCYCLINE HYCLATE 100 MG: 100 CAPSULE ORAL at 10:21

## 2017-12-22 RX ADMIN — MELOXICAM 15 MG: 7.5 TABLET ORAL at 10:19

## 2017-12-22 RX ADMIN — NYSTATIN: 100000 POWDER TOPICAL at 05:18

## 2017-12-22 RX ADMIN — PANTOPRAZOLE SODIUM 40 MG: 40 TABLET, DELAYED RELEASE ORAL at 06:05

## 2017-12-22 RX ADMIN — NYSTATIN: 100000 POWDER TOPICAL at 14:31

## 2017-12-22 RX ADMIN — CETIRIZINE HYDROCHLORIDE 10 MG: 10 TABLET, FILM COATED ORAL at 10:20

## 2017-12-22 RX ADMIN — Medication 250 MG: at 10:20

## 2017-12-23 PROBLEM — E87.1 CHRONIC HYPONATREMIA: Status: ACTIVE | Noted: 2017-12-23

## 2017-12-23 LAB — SODIUM BLD-SCNC: 130 MMOL/L (ref 132–146)

## 2017-12-23 PROCEDURE — 94640 AIRWAY INHALATION TREATMENT: CPT

## 2017-12-23 PROCEDURE — 94799 UNLISTED PULMONARY SVC/PX: CPT

## 2017-12-23 PROCEDURE — 84295 ASSAY OF SERUM SODIUM: CPT | Performed by: FAMILY MEDICINE

## 2017-12-23 PROCEDURE — 99232 SBSQ HOSP IP/OBS MODERATE 35: CPT | Performed by: NURSE PRACTITIONER

## 2017-12-23 PROCEDURE — 94660 CPAP INITIATION&MGMT: CPT

## 2017-12-23 PROCEDURE — 63710000001 PREDNISONE PER 1 MG: Performed by: NURSE PRACTITIONER

## 2017-12-23 PROCEDURE — 25010000002 ENOXAPARIN PER 10 MG: Performed by: FAMILY MEDICINE

## 2017-12-23 RX ORDER — IPRATROPIUM BROMIDE AND ALBUTEROL SULFATE 2.5; .5 MG/3ML; MG/3ML
3 SOLUTION RESPIRATORY (INHALATION)
Status: DISCONTINUED | OUTPATIENT
Start: 2017-12-23 | End: 2017-12-27 | Stop reason: HOSPADM

## 2017-12-23 RX ORDER — PREDNISONE 20 MG/1
60 TABLET ORAL
Status: DISCONTINUED | OUTPATIENT
Start: 2017-12-23 | End: 2017-12-25

## 2017-12-23 RX ORDER — SENNA AND DOCUSATE SODIUM 50; 8.6 MG/1; MG/1
2 TABLET, FILM COATED ORAL 2 TIMES DAILY PRN
Status: DISCONTINUED | OUTPATIENT
Start: 2017-12-23 | End: 2017-12-27 | Stop reason: HOSPADM

## 2017-12-23 RX ADMIN — LORAZEPAM 1 MG: 1 TABLET ORAL at 21:23

## 2017-12-23 RX ADMIN — DOXYCYCLINE HYCLATE 100 MG: 100 CAPSULE ORAL at 21:17

## 2017-12-23 RX ADMIN — ISOSORBIDE MONONITRATE 30 MG: 30 TABLET, EXTENDED RELEASE ORAL at 09:44

## 2017-12-23 RX ADMIN — FUROSEMIDE 40 MG: 40 TABLET ORAL at 09:44

## 2017-12-23 RX ADMIN — PREDNISONE 60 MG: 20 TABLET ORAL at 09:51

## 2017-12-23 RX ADMIN — BUSPIRONE HYDROCHLORIDE 10 MG: 10 TABLET ORAL at 21:17

## 2017-12-23 RX ADMIN — MONTELUKAST SODIUM 10 MG: 10 TABLET, FILM COATED ORAL at 21:17

## 2017-12-23 RX ADMIN — LOSARTAN POTASSIUM 50 MG: 25 TABLET, FILM COATED ORAL at 09:44

## 2017-12-23 RX ADMIN — NYSTATIN: 100000 POWDER TOPICAL at 06:22

## 2017-12-23 RX ADMIN — NYSTATIN: 100000 POWDER TOPICAL at 21:18

## 2017-12-23 RX ADMIN — IPRATROPIUM BROMIDE AND ALBUTEROL SULFATE 3 ML: .5; 3 SOLUTION RESPIRATORY (INHALATION) at 08:15

## 2017-12-23 RX ADMIN — METOPROLOL TARTRATE 25 MG: 25 TABLET ORAL at 21:17

## 2017-12-23 RX ADMIN — HYDROCODONE BITARTRATE AND ACETAMINOPHEN 1 TABLET: 5; 325 TABLET ORAL at 00:34

## 2017-12-23 RX ADMIN — Medication 250 MG: at 21:17

## 2017-12-23 RX ADMIN — ENOXAPARIN SODIUM 40 MG: 40 INJECTION SUBCUTANEOUS at 06:22

## 2017-12-23 RX ADMIN — BUDESONIDE AND FORMOTEROL FUMARATE DIHYDRATE 2 PUFF: 80; 4.5 AEROSOL RESPIRATORY (INHALATION) at 08:16

## 2017-12-23 RX ADMIN — ATORVASTATIN CALCIUM 10 MG: 10 TABLET, FILM COATED ORAL at 09:43

## 2017-12-23 RX ADMIN — GABAPENTIN 800 MG: 400 CAPSULE ORAL at 13:21

## 2017-12-23 RX ADMIN — BUSPIRONE HYDROCHLORIDE 10 MG: 10 TABLET ORAL at 13:21

## 2017-12-23 RX ADMIN — METOPROLOL TARTRATE 25 MG: 25 TABLET ORAL at 09:44

## 2017-12-23 RX ADMIN — DOXYCYCLINE HYCLATE 100 MG: 100 CAPSULE ORAL at 09:44

## 2017-12-23 RX ADMIN — HYDROXYZINE HYDROCHLORIDE 25 MG: 25 TABLET, FILM COATED ORAL at 00:34

## 2017-12-23 RX ADMIN — BUSPIRONE HYDROCHLORIDE 10 MG: 10 TABLET ORAL at 06:22

## 2017-12-23 RX ADMIN — PANTOPRAZOLE SODIUM 40 MG: 40 TABLET, DELAYED RELEASE ORAL at 06:22

## 2017-12-23 RX ADMIN — GABAPENTIN 800 MG: 400 CAPSULE ORAL at 21:17

## 2017-12-23 RX ADMIN — NYSTATIN: 100000 POWDER TOPICAL at 13:34

## 2017-12-23 RX ADMIN — MELOXICAM 15 MG: 7.5 TABLET ORAL at 09:43

## 2017-12-23 RX ADMIN — LEVOTHYROXINE SODIUM 50 MCG: 25 TABLET ORAL at 06:22

## 2017-12-23 RX ADMIN — BUDESONIDE AND FORMOTEROL FUMARATE DIHYDRATE 2 PUFF: 80; 4.5 AEROSOL RESPIRATORY (INHALATION) at 19:25

## 2017-12-23 RX ADMIN — IPRATROPIUM BROMIDE AND ALBUTEROL SULFATE 3 ML: .5; 3 SOLUTION RESPIRATORY (INHALATION) at 19:24

## 2017-12-23 RX ADMIN — ASPIRIN 81 MG: 81 TABLET, COATED ORAL at 08:54

## 2017-12-23 RX ADMIN — IPRATROPIUM BROMIDE AND ALBUTEROL SULFATE 3 ML: .5; 3 SOLUTION RESPIRATORY (INHALATION) at 13:11

## 2017-12-23 RX ADMIN — GABAPENTIN 800 MG: 400 CAPSULE ORAL at 06:21

## 2017-12-23 RX ADMIN — Medication 250 MG: at 09:44

## 2017-12-23 RX ADMIN — IPRATROPIUM BROMIDE AND ALBUTEROL SULFATE 3 ML: .5; 3 SOLUTION RESPIRATORY (INHALATION) at 03:59

## 2017-12-23 RX ADMIN — CETIRIZINE HYDROCHLORIDE 10 MG: 10 TABLET, FILM COATED ORAL at 09:44

## 2017-12-24 LAB
ANION GAP SERPL CALCULATED.3IONS-SCNC: 6 MMOL/L (ref 3–11)
BASOPHILS # BLD AUTO: 0 10*3/MM3 (ref 0–0.2)
BASOPHILS NFR BLD AUTO: 0 % (ref 0–1)
BUN BLD-MCNC: 15 MG/DL (ref 9–23)
BUN/CREAT SERPL: 21.4 (ref 7–25)
CALCIUM SPEC-SCNC: 9.4 MG/DL (ref 8.7–10.4)
CHLORIDE SERPL-SCNC: 96 MMOL/L (ref 99–109)
CO2 SERPL-SCNC: 30 MMOL/L (ref 20–31)
CREAT BLD-MCNC: 0.7 MG/DL (ref 0.6–1.3)
DEPRECATED RDW RBC AUTO: 52.3 FL (ref 37–54)
EOSINOPHIL # BLD AUTO: 0.01 10*3/MM3 (ref 0–0.3)
EOSINOPHIL NFR BLD AUTO: 0.1 % (ref 0–3)
ERYTHROCYTE [DISTWIDTH] IN BLOOD BY AUTOMATED COUNT: 14.4 % (ref 11.3–14.5)
GFR SERPL CREATININE-BSD FRML MDRD: 82 ML/MIN/1.73
GLUCOSE BLD-MCNC: 137 MG/DL (ref 70–100)
GLUCOSE BLDC GLUCOMTR-MCNC: 158 MG/DL (ref 70–130)
HCT VFR BLD AUTO: 32.4 % (ref 34.5–44)
HGB BLD-MCNC: 10.4 G/DL (ref 11.5–15.5)
IMM GRANULOCYTES # BLD: 0.02 10*3/MM3 (ref 0–0.03)
IMM GRANULOCYTES NFR BLD: 0.3 % (ref 0–0.6)
LYMPHOCYTES # BLD AUTO: 0.86 10*3/MM3 (ref 0.6–4.8)
LYMPHOCYTES NFR BLD AUTO: 10.8 % (ref 24–44)
MCH RBC QN AUTO: 31.6 PG (ref 27–31)
MCHC RBC AUTO-ENTMCNC: 32.1 G/DL (ref 32–36)
MCV RBC AUTO: 98.5 FL (ref 80–99)
MONOCYTES # BLD AUTO: 0.52 10*3/MM3 (ref 0–1)
MONOCYTES NFR BLD AUTO: 6.5 % (ref 0–12)
NEUTROPHILS # BLD AUTO: 6.54 10*3/MM3 (ref 1.5–8.3)
NEUTROPHILS NFR BLD AUTO: 82.3 % (ref 41–71)
PLATELET # BLD AUTO: 229 10*3/MM3 (ref 150–450)
PMV BLD AUTO: 8.5 FL (ref 6–12)
POTASSIUM BLD-SCNC: 5 MMOL/L (ref 3.5–5.5)
RBC # BLD AUTO: 3.29 10*6/MM3 (ref 3.89–5.14)
SODIUM BLD-SCNC: 132 MMOL/L (ref 132–146)
WBC NRBC COR # BLD: 7.95 10*3/MM3 (ref 3.5–10.8)

## 2017-12-24 PROCEDURE — 80048 BASIC METABOLIC PNL TOTAL CA: CPT | Performed by: NURSE PRACTITIONER

## 2017-12-24 PROCEDURE — 94799 UNLISTED PULMONARY SVC/PX: CPT

## 2017-12-24 PROCEDURE — 97166 OT EVAL MOD COMPLEX 45 MIN: CPT

## 2017-12-24 PROCEDURE — 94640 AIRWAY INHALATION TREATMENT: CPT

## 2017-12-24 PROCEDURE — 63710000001 INSULIN LISPRO (HUMAN) PER 5 UNITS: Performed by: NURSE PRACTITIONER

## 2017-12-24 PROCEDURE — 82962 GLUCOSE BLOOD TEST: CPT

## 2017-12-24 PROCEDURE — 94760 N-INVAS EAR/PLS OXIMETRY 1: CPT

## 2017-12-24 PROCEDURE — 25010000002 ENOXAPARIN PER 10 MG: Performed by: FAMILY MEDICINE

## 2017-12-24 PROCEDURE — 63710000001 PREDNISONE PER 1 MG: Performed by: NURSE PRACTITIONER

## 2017-12-24 PROCEDURE — 85025 COMPLETE CBC W/AUTO DIFF WBC: CPT | Performed by: NURSE PRACTITIONER

## 2017-12-24 PROCEDURE — 99233 SBSQ HOSP IP/OBS HIGH 50: CPT | Performed by: NURSE PRACTITIONER

## 2017-12-24 PROCEDURE — 97162 PT EVAL MOD COMPLEX 30 MIN: CPT | Performed by: PHYSICAL THERAPIST

## 2017-12-24 PROCEDURE — 97110 THERAPEUTIC EXERCISES: CPT | Performed by: PHYSICAL THERAPIST

## 2017-12-24 RX ORDER — BENZONATATE 100 MG/1
100 CAPSULE ORAL 3 TIMES DAILY PRN
Status: DISCONTINUED | OUTPATIENT
Start: 2017-12-24 | End: 2017-12-25

## 2017-12-24 RX ORDER — DEXTROSE MONOHYDRATE 25 G/50ML
25 INJECTION, SOLUTION INTRAVENOUS
Status: DISCONTINUED | OUTPATIENT
Start: 2017-12-24 | End: 2017-12-25

## 2017-12-24 RX ORDER — LOSARTAN POTASSIUM 25 MG/1
25 TABLET ORAL ONCE
Status: COMPLETED | OUTPATIENT
Start: 2017-12-24 | End: 2017-12-24

## 2017-12-24 RX ORDER — NICOTINE POLACRILEX 4 MG
15 LOZENGE BUCCAL
Status: DISCONTINUED | OUTPATIENT
Start: 2017-12-24 | End: 2017-12-25

## 2017-12-24 RX ORDER — LOSARTAN POTASSIUM 25 MG/1
75 TABLET ORAL DAILY
Status: DISCONTINUED | OUTPATIENT
Start: 2017-12-25 | End: 2017-12-27 | Stop reason: HOSPADM

## 2017-12-24 RX ADMIN — NYSTATIN: 100000 POWDER TOPICAL at 21:47

## 2017-12-24 RX ADMIN — NYSTATIN: 100000 POWDER TOPICAL at 05:54

## 2017-12-24 RX ADMIN — Medication 250 MG: at 09:25

## 2017-12-24 RX ADMIN — FUROSEMIDE 40 MG: 40 TABLET ORAL at 09:25

## 2017-12-24 RX ADMIN — Medication 250 MG: at 21:08

## 2017-12-24 RX ADMIN — PREDNISONE 60 MG: 20 TABLET ORAL at 09:24

## 2017-12-24 RX ADMIN — BUSPIRONE HYDROCHLORIDE 10 MG: 10 TABLET ORAL at 21:08

## 2017-12-24 RX ADMIN — LOSARTAN POTASSIUM 25 MG: 25 TABLET, FILM COATED ORAL at 12:18

## 2017-12-24 RX ADMIN — BUDESONIDE AND FORMOTEROL FUMARATE DIHYDRATE 2 PUFF: 80; 4.5 AEROSOL RESPIRATORY (INHALATION) at 07:39

## 2017-12-24 RX ADMIN — DOXYCYCLINE HYCLATE 100 MG: 100 CAPSULE ORAL at 21:08

## 2017-12-24 RX ADMIN — NYSTATIN: 100000 POWDER TOPICAL at 15:47

## 2017-12-24 RX ADMIN — BUDESONIDE AND FORMOTEROL FUMARATE DIHYDRATE 2 PUFF: 80; 4.5 AEROSOL RESPIRATORY (INHALATION) at 19:23

## 2017-12-24 RX ADMIN — GABAPENTIN 800 MG: 400 CAPSULE ORAL at 15:45

## 2017-12-24 RX ADMIN — LOSARTAN POTASSIUM 50 MG: 25 TABLET, FILM COATED ORAL at 09:24

## 2017-12-24 RX ADMIN — MONTELUKAST SODIUM 10 MG: 10 TABLET, FILM COATED ORAL at 21:08

## 2017-12-24 RX ADMIN — LEVOTHYROXINE SODIUM 50 MCG: 25 TABLET ORAL at 05:53

## 2017-12-24 RX ADMIN — BUSPIRONE HYDROCHLORIDE 10 MG: 10 TABLET ORAL at 05:53

## 2017-12-24 RX ADMIN — BUSPIRONE HYDROCHLORIDE 10 MG: 10 TABLET ORAL at 15:45

## 2017-12-24 RX ADMIN — ISOSORBIDE MONONITRATE 30 MG: 30 TABLET, EXTENDED RELEASE ORAL at 09:24

## 2017-12-24 RX ADMIN — IPRATROPIUM BROMIDE AND ALBUTEROL SULFATE 3 ML: .5; 3 SOLUTION RESPIRATORY (INHALATION) at 19:23

## 2017-12-24 RX ADMIN — DOXYCYCLINE HYCLATE 100 MG: 100 CAPSULE ORAL at 09:25

## 2017-12-24 RX ADMIN — CETIRIZINE HYDROCHLORIDE 10 MG: 10 TABLET, FILM COATED ORAL at 09:35

## 2017-12-24 RX ADMIN — ASPIRIN 81 MG: 81 TABLET, COATED ORAL at 09:25

## 2017-12-24 RX ADMIN — METOPROLOL TARTRATE 25 MG: 25 TABLET ORAL at 09:24

## 2017-12-24 RX ADMIN — ATORVASTATIN CALCIUM 10 MG: 10 TABLET, FILM COATED ORAL at 09:35

## 2017-12-24 RX ADMIN — GABAPENTIN 800 MG: 400 CAPSULE ORAL at 05:53

## 2017-12-24 RX ADMIN — INSULIN LISPRO 2 UNITS: 100 INJECTION, SOLUTION INTRAVENOUS; SUBCUTANEOUS at 18:26

## 2017-12-24 RX ADMIN — IPRATROPIUM BROMIDE AND ALBUTEROL SULFATE 3 ML: .5; 3 SOLUTION RESPIRATORY (INHALATION) at 13:32

## 2017-12-24 RX ADMIN — METOPROLOL TARTRATE 25 MG: 25 TABLET ORAL at 21:08

## 2017-12-24 RX ADMIN — HYDROXYZINE HYDROCHLORIDE 25 MG: 25 TABLET, FILM COATED ORAL at 15:45

## 2017-12-24 RX ADMIN — ENOXAPARIN SODIUM 40 MG: 40 INJECTION SUBCUTANEOUS at 05:53

## 2017-12-24 RX ADMIN — GABAPENTIN 800 MG: 400 CAPSULE ORAL at 21:08

## 2017-12-24 RX ADMIN — IPRATROPIUM BROMIDE AND ALBUTEROL SULFATE 3 ML: .5; 3 SOLUTION RESPIRATORY (INHALATION) at 23:53

## 2017-12-24 RX ADMIN — PANTOPRAZOLE SODIUM 40 MG: 40 TABLET, DELAYED RELEASE ORAL at 05:53

## 2017-12-24 RX ADMIN — IPRATROPIUM BROMIDE AND ALBUTEROL SULFATE 3 ML: .5; 3 SOLUTION RESPIRATORY (INHALATION) at 00:17

## 2017-12-24 RX ADMIN — MELOXICAM 15 MG: 7.5 TABLET ORAL at 09:25

## 2017-12-24 RX ADMIN — IPRATROPIUM BROMIDE AND ALBUTEROL SULFATE 3 ML: .5; 3 SOLUTION RESPIRATORY (INHALATION) at 07:34

## 2017-12-25 LAB
BACTERIA SPEC AEROBE CULT: NORMAL
BACTERIA SPEC AEROBE CULT: NORMAL
GLUCOSE BLDC GLUCOMTR-MCNC: 182 MG/DL (ref 70–130)
GLUCOSE BLDC GLUCOMTR-MCNC: 185 MG/DL (ref 70–130)
GLUCOSE BLDC GLUCOMTR-MCNC: 189 MG/DL (ref 70–130)
GLUCOSE BLDC GLUCOMTR-MCNC: 90 MG/DL (ref 70–130)
GLUCOSE BLDC GLUCOMTR-MCNC: 95 MG/DL (ref 70–130)

## 2017-12-25 PROCEDURE — 82962 GLUCOSE BLOOD TEST: CPT

## 2017-12-25 PROCEDURE — 94660 CPAP INITIATION&MGMT: CPT

## 2017-12-25 PROCEDURE — 94640 AIRWAY INHALATION TREATMENT: CPT

## 2017-12-25 PROCEDURE — 94799 UNLISTED PULMONARY SVC/PX: CPT

## 2017-12-25 PROCEDURE — 99232 SBSQ HOSP IP/OBS MODERATE 35: CPT | Performed by: NURSE PRACTITIONER

## 2017-12-25 PROCEDURE — 63710000001 PREDNISONE PER 1 MG: Performed by: NURSE PRACTITIONER

## 2017-12-25 PROCEDURE — 25010000002 ENOXAPARIN PER 10 MG: Performed by: FAMILY MEDICINE

## 2017-12-25 RX ORDER — BENZONATATE 100 MG/1
100 CAPSULE ORAL 3 TIMES DAILY
Status: DISCONTINUED | OUTPATIENT
Start: 2017-12-25 | End: 2017-12-27 | Stop reason: HOSPADM

## 2017-12-25 RX ADMIN — ATORVASTATIN CALCIUM 10 MG: 10 TABLET, FILM COATED ORAL at 09:43

## 2017-12-25 RX ADMIN — GABAPENTIN 800 MG: 400 CAPSULE ORAL at 06:04

## 2017-12-25 RX ADMIN — IPRATROPIUM BROMIDE AND ALBUTEROL SULFATE 3 ML: .5; 3 SOLUTION RESPIRATORY (INHALATION) at 18:57

## 2017-12-25 RX ADMIN — Medication 250 MG: at 09:43

## 2017-12-25 RX ADMIN — METOPROLOL TARTRATE 25 MG: 25 TABLET ORAL at 09:43

## 2017-12-25 RX ADMIN — HYDROCODONE BITARTRATE AND ACETAMINOPHEN 1 TABLET: 5; 325 TABLET ORAL at 15:48

## 2017-12-25 RX ADMIN — PREDNISONE 60 MG: 20 TABLET ORAL at 09:42

## 2017-12-25 RX ADMIN — CETIRIZINE HYDROCHLORIDE 10 MG: 10 TABLET, FILM COATED ORAL at 09:43

## 2017-12-25 RX ADMIN — BUDESONIDE AND FORMOTEROL FUMARATE DIHYDRATE 2 PUFF: 80; 4.5 AEROSOL RESPIRATORY (INHALATION) at 19:07

## 2017-12-25 RX ADMIN — LORAZEPAM 1 MG: 1 TABLET ORAL at 15:48

## 2017-12-25 RX ADMIN — PANTOPRAZOLE SODIUM 40 MG: 40 TABLET, DELAYED RELEASE ORAL at 06:04

## 2017-12-25 RX ADMIN — FUROSEMIDE 40 MG: 40 TABLET ORAL at 09:44

## 2017-12-25 RX ADMIN — BUSPIRONE HYDROCHLORIDE 10 MG: 10 TABLET ORAL at 06:05

## 2017-12-25 RX ADMIN — BENZONATATE 100 MG: 100 CAPSULE ORAL at 20:54

## 2017-12-25 RX ADMIN — BENZONATATE 100 MG: 100 CAPSULE ORAL at 15:48

## 2017-12-25 RX ADMIN — BUSPIRONE HYDROCHLORIDE 10 MG: 10 TABLET ORAL at 20:54

## 2017-12-25 RX ADMIN — METOPROLOL TARTRATE 25 MG: 25 TABLET ORAL at 20:53

## 2017-12-25 RX ADMIN — MELOXICAM 15 MG: 7.5 TABLET ORAL at 09:44

## 2017-12-25 RX ADMIN — IPRATROPIUM BROMIDE AND ALBUTEROL SULFATE 3 ML: .5; 3 SOLUTION RESPIRATORY (INHALATION) at 07:31

## 2017-12-25 RX ADMIN — NYSTATIN: 100000 POWDER TOPICAL at 06:08

## 2017-12-25 RX ADMIN — NYSTATIN: 100000 POWDER TOPICAL at 15:49

## 2017-12-25 RX ADMIN — MONTELUKAST SODIUM 10 MG: 10 TABLET, FILM COATED ORAL at 20:53

## 2017-12-25 RX ADMIN — BUDESONIDE AND FORMOTEROL FUMARATE DIHYDRATE 2 PUFF: 80; 4.5 AEROSOL RESPIRATORY (INHALATION) at 07:31

## 2017-12-25 RX ADMIN — LEVOTHYROXINE SODIUM 50 MCG: 25 TABLET ORAL at 06:05

## 2017-12-25 RX ADMIN — DOXYCYCLINE HYCLATE 100 MG: 100 CAPSULE ORAL at 09:44

## 2017-12-25 RX ADMIN — BUSPIRONE HYDROCHLORIDE 10 MG: 10 TABLET ORAL at 15:48

## 2017-12-25 RX ADMIN — ISOSORBIDE MONONITRATE 30 MG: 30 TABLET, EXTENDED RELEASE ORAL at 09:43

## 2017-12-25 RX ADMIN — NYSTATIN: 100000 POWDER TOPICAL at 20:55

## 2017-12-25 RX ADMIN — GABAPENTIN 800 MG: 400 CAPSULE ORAL at 20:53

## 2017-12-25 RX ADMIN — Medication 250 MG: at 20:54

## 2017-12-25 RX ADMIN — GABAPENTIN 800 MG: 400 CAPSULE ORAL at 15:48

## 2017-12-25 RX ADMIN — LORAZEPAM 1 MG: 1 TABLET ORAL at 20:53

## 2017-12-25 RX ADMIN — LOSARTAN POTASSIUM 75 MG: 25 TABLET, FILM COATED ORAL at 09:42

## 2017-12-25 RX ADMIN — ASPIRIN 81 MG: 81 TABLET, COATED ORAL at 09:44

## 2017-12-25 RX ADMIN — ENOXAPARIN SODIUM 40 MG: 40 INJECTION SUBCUTANEOUS at 06:05

## 2017-12-26 LAB
GLUCOSE BLDC GLUCOMTR-MCNC: 122 MG/DL (ref 70–130)
GLUCOSE BLDC GLUCOMTR-MCNC: 158 MG/DL (ref 70–130)
GLUCOSE BLDC GLUCOMTR-MCNC: 76 MG/DL (ref 70–130)
GLUCOSE BLDC GLUCOMTR-MCNC: 97 MG/DL (ref 70–130)

## 2017-12-26 PROCEDURE — 94799 UNLISTED PULMONARY SVC/PX: CPT

## 2017-12-26 PROCEDURE — 99232 SBSQ HOSP IP/OBS MODERATE 35: CPT | Performed by: NURSE PRACTITIONER

## 2017-12-26 PROCEDURE — 94640 AIRWAY INHALATION TREATMENT: CPT

## 2017-12-26 PROCEDURE — 25010000002 ENOXAPARIN PER 10 MG: Performed by: FAMILY MEDICINE

## 2017-12-26 PROCEDURE — 63710000001 PREDNISONE PER 5 MG: Performed by: NURSE PRACTITIONER

## 2017-12-26 PROCEDURE — 63710000001 PREDNISONE PER 1 MG: Performed by: NURSE PRACTITIONER

## 2017-12-26 PROCEDURE — 82962 GLUCOSE BLOOD TEST: CPT

## 2017-12-26 PROCEDURE — 94660 CPAP INITIATION&MGMT: CPT

## 2017-12-26 RX ADMIN — BUDESONIDE AND FORMOTEROL FUMARATE DIHYDRATE 2 PUFF: 80; 4.5 AEROSOL RESPIRATORY (INHALATION) at 08:06

## 2017-12-26 RX ADMIN — GABAPENTIN 800 MG: 400 CAPSULE ORAL at 21:32

## 2017-12-26 RX ADMIN — LOSARTAN POTASSIUM 75 MG: 25 TABLET, FILM COATED ORAL at 09:17

## 2017-12-26 RX ADMIN — BUSPIRONE HYDROCHLORIDE 10 MG: 10 TABLET ORAL at 15:41

## 2017-12-26 RX ADMIN — IPRATROPIUM BROMIDE AND ALBUTEROL SULFATE 3 ML: .5; 3 SOLUTION RESPIRATORY (INHALATION) at 19:50

## 2017-12-26 RX ADMIN — BUDESONIDE AND FORMOTEROL FUMARATE DIHYDRATE 2 PUFF: 80; 4.5 AEROSOL RESPIRATORY (INHALATION) at 19:50

## 2017-12-26 RX ADMIN — NYSTATIN: 100000 POWDER TOPICAL at 05:59

## 2017-12-26 RX ADMIN — ENOXAPARIN SODIUM 40 MG: 40 INJECTION SUBCUTANEOUS at 05:58

## 2017-12-26 RX ADMIN — LEVOTHYROXINE SODIUM 50 MCG: 25 TABLET ORAL at 05:58

## 2017-12-26 RX ADMIN — IPRATROPIUM BROMIDE AND ALBUTEROL SULFATE 3 ML: .5; 3 SOLUTION RESPIRATORY (INHALATION) at 13:42

## 2017-12-26 RX ADMIN — BENZONATATE 100 MG: 100 CAPSULE ORAL at 15:41

## 2017-12-26 RX ADMIN — BUSPIRONE HYDROCHLORIDE 10 MG: 10 TABLET ORAL at 05:58

## 2017-12-26 RX ADMIN — NYSTATIN: 100000 POWDER TOPICAL at 15:42

## 2017-12-26 RX ADMIN — CETIRIZINE HYDROCHLORIDE 10 MG: 10 TABLET, FILM COATED ORAL at 09:17

## 2017-12-26 RX ADMIN — LORAZEPAM 1 MG: 1 TABLET ORAL at 21:38

## 2017-12-26 RX ADMIN — IPRATROPIUM BROMIDE AND ALBUTEROL SULFATE 3 ML: .5; 3 SOLUTION RESPIRATORY (INHALATION) at 08:00

## 2017-12-26 RX ADMIN — FUROSEMIDE 40 MG: 40 TABLET ORAL at 09:17

## 2017-12-26 RX ADMIN — MONTELUKAST SODIUM 10 MG: 10 TABLET, FILM COATED ORAL at 21:32

## 2017-12-26 RX ADMIN — GABAPENTIN 800 MG: 400 CAPSULE ORAL at 15:42

## 2017-12-26 RX ADMIN — PANTOPRAZOLE SODIUM 40 MG: 40 TABLET, DELAYED RELEASE ORAL at 05:58

## 2017-12-26 RX ADMIN — ASPIRIN 81 MG: 81 TABLET, COATED ORAL at 09:17

## 2017-12-26 RX ADMIN — Medication 250 MG: at 21:32

## 2017-12-26 RX ADMIN — ISOSORBIDE MONONITRATE 30 MG: 30 TABLET, EXTENDED RELEASE ORAL at 09:17

## 2017-12-26 RX ADMIN — NYSTATIN: 100000 POWDER TOPICAL at 21:32

## 2017-12-26 RX ADMIN — Medication 250 MG: at 09:17

## 2017-12-26 RX ADMIN — ATORVASTATIN CALCIUM 10 MG: 10 TABLET, FILM COATED ORAL at 09:16

## 2017-12-26 RX ADMIN — BUSPIRONE HYDROCHLORIDE 10 MG: 10 TABLET ORAL at 21:32

## 2017-12-26 RX ADMIN — METOPROLOL TARTRATE 25 MG: 25 TABLET ORAL at 09:16

## 2017-12-26 RX ADMIN — MELOXICAM 15 MG: 7.5 TABLET ORAL at 09:17

## 2017-12-26 RX ADMIN — BENZONATATE 100 MG: 100 CAPSULE ORAL at 21:32

## 2017-12-26 RX ADMIN — METOPROLOL TARTRATE 25 MG: 25 TABLET ORAL at 21:32

## 2017-12-26 RX ADMIN — PREDNISONE 50 MG: 20 TABLET ORAL at 09:16

## 2017-12-26 RX ADMIN — GABAPENTIN 800 MG: 400 CAPSULE ORAL at 05:58

## 2017-12-26 RX ADMIN — BENZONATATE 100 MG: 100 CAPSULE ORAL at 09:17

## 2017-12-26 RX ADMIN — IPRATROPIUM BROMIDE AND ALBUTEROL SULFATE 3 ML: .5; 3 SOLUTION RESPIRATORY (INHALATION) at 00:58

## 2017-12-27 VITALS
HEART RATE: 67 BPM | RESPIRATION RATE: 16 BRPM | OXYGEN SATURATION: 96 % | SYSTOLIC BLOOD PRESSURE: 187 MMHG | TEMPERATURE: 97.6 F | WEIGHT: 182.2 LBS | HEIGHT: 65 IN | DIASTOLIC BLOOD PRESSURE: 98 MMHG | BODY MASS INDEX: 30.35 KG/M2

## 2017-12-27 LAB
ANION GAP SERPL CALCULATED.3IONS-SCNC: 6 MMOL/L (ref 3–11)
BUN BLD-MCNC: 25 MG/DL (ref 9–23)
BUN/CREAT SERPL: 31.3 (ref 7–25)
CALCIUM SPEC-SCNC: 9.3 MG/DL (ref 8.7–10.4)
CHLORIDE SERPL-SCNC: 93 MMOL/L (ref 99–109)
CO2 SERPL-SCNC: 33 MMOL/L (ref 20–31)
CREAT BLD-MCNC: 0.8 MG/DL (ref 0.6–1.3)
DEPRECATED RDW RBC AUTO: 50.4 FL (ref 37–54)
ERYTHROCYTE [DISTWIDTH] IN BLOOD BY AUTOMATED COUNT: 14.2 % (ref 11.3–14.5)
GFR SERPL CREATININE-BSD FRML MDRD: 71 ML/MIN/1.73
GLUCOSE BLD-MCNC: 84 MG/DL (ref 70–100)
GLUCOSE BLDC GLUCOMTR-MCNC: 102 MG/DL (ref 70–130)
GLUCOSE BLDC GLUCOMTR-MCNC: 85 MG/DL (ref 70–130)
HCT VFR BLD AUTO: 34 % (ref 34.5–44)
HGB BLD-MCNC: 10.9 G/DL (ref 11.5–15.5)
MCH RBC QN AUTO: 31.1 PG (ref 27–31)
MCHC RBC AUTO-ENTMCNC: 32.1 G/DL (ref 32–36)
MCV RBC AUTO: 96.9 FL (ref 80–99)
PLATELET # BLD AUTO: 247 10*3/MM3 (ref 150–450)
PMV BLD AUTO: 8.4 FL (ref 6–12)
POTASSIUM BLD-SCNC: 4.8 MMOL/L (ref 3.5–5.5)
RBC # BLD AUTO: 3.51 10*6/MM3 (ref 3.89–5.14)
SODIUM BLD-SCNC: 132 MMOL/L (ref 132–146)
WBC NRBC COR # BLD: 7.39 10*3/MM3 (ref 3.5–10.8)

## 2017-12-27 PROCEDURE — 94799 UNLISTED PULMONARY SVC/PX: CPT

## 2017-12-27 PROCEDURE — 82962 GLUCOSE BLOOD TEST: CPT

## 2017-12-27 PROCEDURE — 85027 COMPLETE CBC AUTOMATED: CPT | Performed by: NURSE PRACTITIONER

## 2017-12-27 PROCEDURE — 97110 THERAPEUTIC EXERCISES: CPT

## 2017-12-27 PROCEDURE — 94640 AIRWAY INHALATION TREATMENT: CPT

## 2017-12-27 PROCEDURE — 63710000001 PREDNISONE PER 1 MG: Performed by: NURSE PRACTITIONER

## 2017-12-27 PROCEDURE — 97116 GAIT TRAINING THERAPY: CPT

## 2017-12-27 PROCEDURE — 63710000001 PREDNISONE PER 5 MG: Performed by: NURSE PRACTITIONER

## 2017-12-27 PROCEDURE — 80048 BASIC METABOLIC PNL TOTAL CA: CPT | Performed by: NURSE PRACTITIONER

## 2017-12-27 PROCEDURE — 99239 HOSP IP/OBS DSCHRG MGMT >30: CPT | Performed by: INTERNAL MEDICINE

## 2017-12-27 RX ORDER — ISOSORBIDE MONONITRATE 30 MG/1
60 TABLET, EXTENDED RELEASE ORAL
Start: 2017-12-28 | End: 2018-01-04

## 2017-12-27 RX ORDER — SENNA AND DOCUSATE SODIUM 50; 8.6 MG/1; MG/1
2 TABLET, FILM COATED ORAL 2 TIMES DAILY PRN
Start: 2017-12-27 | End: 2018-01-04

## 2017-12-27 RX ORDER — PREDNISONE 50 MG/1
TABLET ORAL
Status: ON HOLD
Start: 2017-12-27 | End: 2018-01-11

## 2017-12-27 RX ORDER — IPRATROPIUM BROMIDE AND ALBUTEROL SULFATE 2.5; .5 MG/3ML; MG/3ML
3 SOLUTION RESPIRATORY (INHALATION) EVERY 4 HOURS PRN
Qty: 360 ML
Start: 2017-12-27 | End: 2018-01-04

## 2017-12-27 RX ORDER — LOSARTAN POTASSIUM 25 MG/1
100 TABLET ORAL DAILY
Start: 2017-12-28 | End: 2018-01-04

## 2017-12-27 RX ORDER — HYDROCODONE BITARTRATE AND ACETAMINOPHEN 5; 325 MG/1; MG/1
1 TABLET ORAL EVERY 4 HOURS PRN
Start: 2017-12-27 | End: 2017-12-30

## 2017-12-27 RX ORDER — LORAZEPAM 1 MG/1
1 TABLET ORAL EVERY 6 HOURS PRN
Start: 2017-12-27 | End: 2017-12-31

## 2017-12-27 RX ORDER — SACCHAROMYCES BOULARDII 250 MG
250 CAPSULE ORAL 2 TIMES DAILY
Start: 2017-12-27 | End: 2018-01-04

## 2017-12-27 RX ADMIN — MELOXICAM 15 MG: 7.5 TABLET ORAL at 10:50

## 2017-12-27 RX ADMIN — IPRATROPIUM BROMIDE AND ALBUTEROL SULFATE 3 ML: .5; 3 SOLUTION RESPIRATORY (INHALATION) at 13:14

## 2017-12-27 RX ADMIN — BUDESONIDE AND FORMOTEROL FUMARATE DIHYDRATE 2 PUFF: 80; 4.5 AEROSOL RESPIRATORY (INHALATION) at 13:21

## 2017-12-27 RX ADMIN — Medication 250 MG: at 10:51

## 2017-12-27 RX ADMIN — METOPROLOL TARTRATE 25 MG: 25 TABLET ORAL at 10:50

## 2017-12-27 RX ADMIN — FUROSEMIDE 40 MG: 40 TABLET ORAL at 10:51

## 2017-12-27 RX ADMIN — ATORVASTATIN CALCIUM 10 MG: 10 TABLET, FILM COATED ORAL at 10:52

## 2017-12-27 RX ADMIN — LOSARTAN POTASSIUM 75 MG: 25 TABLET, FILM COATED ORAL at 10:51

## 2017-12-27 RX ADMIN — LORAZEPAM 1 MG: 1 TABLET ORAL at 03:40

## 2017-12-27 RX ADMIN — ASPIRIN 81 MG: 81 TABLET, COATED ORAL at 10:51

## 2017-12-27 RX ADMIN — IPRATROPIUM BROMIDE AND ALBUTEROL SULFATE 3 ML: .5; 3 SOLUTION RESPIRATORY (INHALATION) at 08:24

## 2017-12-27 RX ADMIN — PREDNISONE 50 MG: 20 TABLET ORAL at 10:49

## 2017-12-27 RX ADMIN — BENZONATATE 100 MG: 100 CAPSULE ORAL at 10:50

## 2017-12-27 RX ADMIN — ISOSORBIDE MONONITRATE 30 MG: 30 TABLET, EXTENDED RELEASE ORAL at 10:50

## 2017-12-27 RX ADMIN — CETIRIZINE HYDROCHLORIDE 10 MG: 10 TABLET, FILM COATED ORAL at 10:51

## 2018-01-04 ENCOUNTER — APPOINTMENT (OUTPATIENT)
Dept: GENERAL RADIOLOGY | Facility: HOSPITAL | Age: 73
End: 2018-01-04

## 2018-01-04 ENCOUNTER — HOSPITAL ENCOUNTER (INPATIENT)
Facility: HOSPITAL | Age: 73
LOS: 7 days | Discharge: NURSING, REHAB OR LONG-TERM CARE | End: 2018-01-11
Attending: EMERGENCY MEDICINE | Admitting: INTERNAL MEDICINE

## 2018-01-04 ENCOUNTER — APPOINTMENT (OUTPATIENT)
Dept: CT IMAGING | Facility: HOSPITAL | Age: 73
End: 2018-01-04

## 2018-01-04 DIAGNOSIS — J44.9 CHRONIC OBSTRUCTIVE PULMONARY DISEASE, UNSPECIFIED COPD TYPE (HCC): ICD-10-CM

## 2018-01-04 DIAGNOSIS — Z78.9 IMPAIRED MOBILITY AND ADLS: ICD-10-CM

## 2018-01-04 DIAGNOSIS — Z74.09 IMPAIRED MOBILITY AND ADLS: ICD-10-CM

## 2018-01-04 DIAGNOSIS — R55 SYNCOPE, UNSPECIFIED SYNCOPE TYPE: ICD-10-CM

## 2018-01-04 DIAGNOSIS — R77.8 ELEVATED TROPONIN: Primary | ICD-10-CM

## 2018-01-04 DIAGNOSIS — I48.91 ATRIAL FIBRILLATION WITH RVR (HCC): ICD-10-CM

## 2018-01-04 DIAGNOSIS — I10 ESSENTIAL HYPERTENSION: ICD-10-CM

## 2018-01-04 PROBLEM — R50.9 FEBRILE ILLNESS, ACUTE: Status: ACTIVE | Noted: 2018-01-04

## 2018-01-04 LAB
ALBUMIN SERPL-MCNC: 3.3 G/DL (ref 3.2–4.8)
ALBUMIN SERPL-MCNC: 4.1 G/DL (ref 3.2–4.8)
ALBUMIN/GLOB SERPL: 1.6 G/DL (ref 1.5–2.5)
ALBUMIN/GLOB SERPL: 1.6 G/DL (ref 1.5–2.5)
ALP SERPL-CCNC: 50 U/L (ref 25–100)
ALP SERPL-CCNC: 74 U/L (ref 25–100)
ALT SERPL W P-5'-P-CCNC: 31 U/L (ref 7–40)
ALT SERPL W P-5'-P-CCNC: 42 U/L (ref 7–40)
ANION GAP SERPL CALCULATED.3IONS-SCNC: 5 MMOL/L (ref 3–11)
ANION GAP SERPL CALCULATED.3IONS-SCNC: 7 MMOL/L (ref 3–11)
ARTERIAL PATENCY WRIST A: ABNORMAL
ARTERIAL PATENCY WRIST A: ABNORMAL
AST SERPL-CCNC: 20 U/L (ref 0–33)
AST SERPL-CCNC: 26 U/L (ref 0–33)
ATMOSPHERIC PRESS: ABNORMAL MMHG
ATMOSPHERIC PRESS: ABNORMAL MMHG
BASE EXCESS BLDA CALC-SCNC: 6 MMOL/L (ref 0–2)
BASE EXCESS BLDA CALC-SCNC: 7.9 MMOL/L (ref 0–2)
BASOPHILS # BLD AUTO: 0.02 10*3/MM3 (ref 0–0.2)
BASOPHILS NFR BLD AUTO: 0.2 % (ref 0–1)
BDY SITE: ABNORMAL
BDY SITE: ABNORMAL
BILIRUB SERPL-MCNC: 0.5 MG/DL (ref 0.3–1.2)
BILIRUB SERPL-MCNC: 0.9 MG/DL (ref 0.3–1.2)
BILIRUB UR QL STRIP: NEGATIVE
BNP SERPL-MCNC: 311 PG/ML (ref 0–100)
BUN BLD-MCNC: 20 MG/DL (ref 9–23)
BUN BLD-MCNC: 20 MG/DL (ref 9–23)
BUN/CREAT SERPL: 16.7 (ref 7–25)
BUN/CREAT SERPL: 25 (ref 7–25)
CALCIUM SPEC-SCNC: 8.3 MG/DL (ref 8.7–10.4)
CALCIUM SPEC-SCNC: 9.3 MG/DL (ref 8.7–10.4)
CHLORIDE SERPL-SCNC: 92 MMOL/L (ref 99–109)
CHLORIDE SERPL-SCNC: 94 MMOL/L (ref 99–109)
CLARITY UR: CLEAR
CO2 BLDA-SCNC: 32.4 MMOL/L (ref 22–33)
CO2 BLDA-SCNC: 33.5 MMOL/L (ref 22–33)
CO2 SERPL-SCNC: 32 MMOL/L (ref 20–31)
CO2 SERPL-SCNC: 36 MMOL/L (ref 20–31)
COHGB MFR BLD: 1.9 % (ref 0–2)
COHGB MFR BLD: 2 % (ref 0–2)
COLOR UR: YELLOW
CREAT BLD-MCNC: 0.8 MG/DL (ref 0.6–1.3)
CREAT BLD-MCNC: 1.2 MG/DL (ref 0.6–1.3)
D-LACTATE SERPL-SCNC: 1.2 MMOL/L (ref 0.5–2)
D-LACTATE SERPL-SCNC: 1.3 MMOL/L (ref 0.5–2)
DEPRECATED RDW RBC AUTO: 50.3 FL (ref 37–54)
DEPRECATED RDW RBC AUTO: 50.5 FL (ref 37–54)
EOSINOPHIL # BLD AUTO: 0.19 10*3/MM3 (ref 0–0.3)
EOSINOPHIL NFR BLD AUTO: 2 % (ref 0–3)
ERYTHROCYTE [DISTWIDTH] IN BLOOD BY AUTOMATED COUNT: 13.9 % (ref 11.3–14.5)
ERYTHROCYTE [DISTWIDTH] IN BLOOD BY AUTOMATED COUNT: 14 % (ref 11.3–14.5)
FLUAV SUBTYP SPEC NAA+PROBE: NOT DETECTED
FLUBV RNA ISLT QL NAA+PROBE: NOT DETECTED
GFR SERPL CREATININE-BSD FRML MDRD: 44 ML/MIN/1.73
GFR SERPL CREATININE-BSD FRML MDRD: 71 ML/MIN/1.73
GLOBULIN UR ELPH-MCNC: 2.1 GM/DL
GLOBULIN UR ELPH-MCNC: 2.6 GM/DL
GLUCOSE BLD-MCNC: 123 MG/DL (ref 70–100)
GLUCOSE BLD-MCNC: 90 MG/DL (ref 70–100)
GLUCOSE BLDC GLUCOMTR-MCNC: 124 MG/DL (ref 70–130)
GLUCOSE BLDC GLUCOMTR-MCNC: 128 MG/DL (ref 70–130)
GLUCOSE UR STRIP-MCNC: NEGATIVE MG/DL
HCO3 BLDA-SCNC: 31 MMOL/L (ref 20–26)
HCO3 BLDA-SCNC: 32.2 MMOL/L (ref 20–26)
HCT VFR BLD AUTO: 31.4 % (ref 34.5–44)
HCT VFR BLD AUTO: 37.2 % (ref 34.5–44)
HCT VFR BLD CALC: 29.6 %
HCT VFR BLD CALC: 31.9 %
HGB BLD-MCNC: 10.1 G/DL (ref 11.5–15.5)
HGB BLD-MCNC: 11.8 G/DL (ref 11.5–15.5)
HGB BLDA-MCNC: 10.4 G/DL (ref 14–18)
HGB BLDA-MCNC: 9.7 G/DL (ref 14–18)
HGB UR QL STRIP.AUTO: NEGATIVE
HOLD SPECIMEN: NORMAL
HOLD SPECIMEN: NORMAL
HOROWITZ INDEX BLD+IHG-RTO: 40 %
HOROWITZ INDEX BLD+IHG-RTO: 40 %
IMM GRANULOCYTES # BLD: 0.04 10*3/MM3 (ref 0–0.03)
IMM GRANULOCYTES NFR BLD: 0.4 % (ref 0–0.6)
KETONES UR QL STRIP: NEGATIVE
LEUKOCYTE ESTERASE UR QL STRIP.AUTO: NEGATIVE
LIPASE SERPL-CCNC: 53 U/L (ref 6–51)
LYMPHOCYTES # BLD AUTO: 1.95 10*3/MM3 (ref 0.6–4.8)
LYMPHOCYTES NFR BLD AUTO: 20.6 % (ref 24–44)
MAGNESIUM SERPL-MCNC: 1.9 MG/DL (ref 1.3–2.7)
MCH RBC QN AUTO: 31.3 PG (ref 27–31)
MCH RBC QN AUTO: 31.5 PG (ref 27–31)
MCHC RBC AUTO-ENTMCNC: 31.7 G/DL (ref 32–36)
MCHC RBC AUTO-ENTMCNC: 32.2 G/DL (ref 32–36)
MCV RBC AUTO: 97.8 FL (ref 80–99)
MCV RBC AUTO: 98.7 FL (ref 80–99)
METHGB BLD QL: 1 % (ref 0–1.5)
METHGB BLD QL: 1.2 % (ref 0–1.5)
MODALITY: ABNORMAL
MODALITY: ABNORMAL
MONOCYTES # BLD AUTO: 0.48 10*3/MM3 (ref 0–1)
MONOCYTES NFR BLD AUTO: 5.1 % (ref 0–12)
NEUTROPHILS # BLD AUTO: 6.8 10*3/MM3 (ref 1.5–8.3)
NEUTROPHILS NFR BLD AUTO: 71.7 % (ref 41–71)
NITRITE UR QL STRIP: NEGATIVE
OXYHGB MFR BLDV: 85.7 % (ref 94–99)
OXYHGB MFR BLDV: 92.9 % (ref 94–99)
PCO2 BLDA: 43 MM HG (ref 35–45)
PCO2 BLDA: 46.6 MM HG (ref 35–45)
PH BLDA: 7.43 PH UNITS (ref 7.35–7.45)
PH BLDA: 7.48 PH UNITS (ref 7.35–7.45)
PH UR STRIP.AUTO: 8 [PH] (ref 5–8)
PLATELET # BLD AUTO: 228 10*3/MM3 (ref 150–450)
PLATELET # BLD AUTO: 269 10*3/MM3 (ref 150–450)
PMV BLD AUTO: 8.4 FL (ref 6–12)
PMV BLD AUTO: 8.5 FL (ref 6–12)
PO2 BLDA: 56 MM HG (ref 83–108)
PO2 BLDA: 73.5 MM HG (ref 83–108)
POTASSIUM BLD-SCNC: 4.2 MMOL/L (ref 3.5–5.5)
POTASSIUM BLD-SCNC: 4.7 MMOL/L (ref 3.5–5.5)
PROCALCITONIN SERPL-MCNC: <0.05 NG/ML
PROT SERPL-MCNC: 5.4 G/DL (ref 5.7–8.2)
PROT SERPL-MCNC: 6.7 G/DL (ref 5.7–8.2)
PROT UR QL STRIP: NEGATIVE
RBC # BLD AUTO: 3.21 10*6/MM3 (ref 3.89–5.14)
RBC # BLD AUTO: 3.77 10*6/MM3 (ref 3.89–5.14)
SODIUM BLD-SCNC: 131 MMOL/L (ref 132–146)
SODIUM BLD-SCNC: 135 MMOL/L (ref 132–146)
SP GR UR STRIP: 1.01 (ref 1–1.03)
TROPONIN I SERPL-MCNC: 0.07 NG/ML (ref 0–0.07)
TROPONIN I SERPL-MCNC: 0.15 NG/ML (ref 0–0.07)
TROPONIN I SERPL-MCNC: 0.2 NG/ML
TROPONIN I SERPL-MCNC: 0.43 NG/ML
UROBILINOGEN UR QL STRIP: NORMAL
WBC NRBC COR # BLD: 8.45 10*3/MM3 (ref 3.5–10.8)
WBC NRBC COR # BLD: 9.48 10*3/MM3 (ref 3.5–10.8)
WHOLE BLOOD HOLD SPECIMEN: NORMAL
WHOLE BLOOD HOLD SPECIMEN: NORMAL

## 2018-01-04 PROCEDURE — 85027 COMPLETE CBC AUTOMATED: CPT | Performed by: INTERNAL MEDICINE

## 2018-01-04 PROCEDURE — 83605 ASSAY OF LACTIC ACID: CPT | Performed by: INTERNAL MEDICINE

## 2018-01-04 PROCEDURE — 94799 UNLISTED PULMONARY SVC/PX: CPT

## 2018-01-04 PROCEDURE — 80053 COMPREHEN METABOLIC PANEL: CPT | Performed by: EMERGENCY MEDICINE

## 2018-01-04 PROCEDURE — 85025 COMPLETE CBC W/AUTO DIFF WBC: CPT | Performed by: EMERGENCY MEDICINE

## 2018-01-04 PROCEDURE — 25010000002 FUROSEMIDE PER 20 MG: Performed by: INTERNAL MEDICINE

## 2018-01-04 PROCEDURE — 99222 1ST HOSP IP/OBS MODERATE 55: CPT | Performed by: INTERNAL MEDICINE

## 2018-01-04 PROCEDURE — 94660 CPAP INITIATION&MGMT: CPT

## 2018-01-04 PROCEDURE — 93005 ELECTROCARDIOGRAM TRACING: CPT

## 2018-01-04 PROCEDURE — 83880 ASSAY OF NATRIURETIC PEPTIDE: CPT | Performed by: EMERGENCY MEDICINE

## 2018-01-04 PROCEDURE — 94760 N-INVAS EAR/PLS OXIMETRY 1: CPT

## 2018-01-04 PROCEDURE — 83735 ASSAY OF MAGNESIUM: CPT | Performed by: INTERNAL MEDICINE

## 2018-01-04 PROCEDURE — 80053 COMPREHEN METABOLIC PANEL: CPT | Performed by: INTERNAL MEDICINE

## 2018-01-04 PROCEDURE — 5A09357 ASSISTANCE WITH RESPIRATORY VENTILATION, LESS THAN 24 CONSECUTIVE HOURS, CONTINUOUS POSITIVE AIRWAY PRESSURE: ICD-10-PCS | Performed by: INTERNAL MEDICINE

## 2018-01-04 PROCEDURE — 99223 1ST HOSP IP/OBS HIGH 75: CPT | Performed by: INTERNAL MEDICINE

## 2018-01-04 PROCEDURE — 71045 X-RAY EXAM CHEST 1 VIEW: CPT

## 2018-01-04 PROCEDURE — 84484 ASSAY OF TROPONIN QUANT: CPT

## 2018-01-04 PROCEDURE — 82805 BLOOD GASES W/O2 SATURATION: CPT | Performed by: INTERNAL MEDICINE

## 2018-01-04 PROCEDURE — 94640 AIRWAY INHALATION TREATMENT: CPT

## 2018-01-04 PROCEDURE — 83690 ASSAY OF LIPASE: CPT | Performed by: EMERGENCY MEDICINE

## 2018-01-04 PROCEDURE — 83605 ASSAY OF LACTIC ACID: CPT | Performed by: NURSE PRACTITIONER

## 2018-01-04 PROCEDURE — 25010000002 ENOXAPARIN PER 10 MG: Performed by: NURSE PRACTITIONER

## 2018-01-04 PROCEDURE — 99291 CRITICAL CARE FIRST HOUR: CPT | Performed by: INTERNAL MEDICINE

## 2018-01-04 PROCEDURE — 83880 ASSAY OF NATRIURETIC PEPTIDE: CPT | Performed by: INTERNAL MEDICINE

## 2018-01-04 PROCEDURE — 87502 INFLUENZA DNA AMP PROBE: CPT | Performed by: INTERNAL MEDICINE

## 2018-01-04 PROCEDURE — 84484 ASSAY OF TROPONIN QUANT: CPT | Performed by: NURSE PRACTITIONER

## 2018-01-04 PROCEDURE — 93005 ELECTROCARDIOGRAM TRACING: CPT | Performed by: EMERGENCY MEDICINE

## 2018-01-04 PROCEDURE — 82962 GLUCOSE BLOOD TEST: CPT

## 2018-01-04 PROCEDURE — 99285 EMERGENCY DEPT VISIT HI MDM: CPT

## 2018-01-04 PROCEDURE — 36600 WITHDRAWAL OF ARTERIAL BLOOD: CPT | Performed by: INTERNAL MEDICINE

## 2018-01-04 PROCEDURE — 84145 PROCALCITONIN (PCT): CPT | Performed by: NURSE PRACTITIONER

## 2018-01-04 PROCEDURE — 87040 BLOOD CULTURE FOR BACTERIA: CPT | Performed by: NURSE PRACTITIONER

## 2018-01-04 PROCEDURE — 81003 URINALYSIS AUTO W/O SCOPE: CPT | Performed by: INTERNAL MEDICINE

## 2018-01-04 PROCEDURE — 84484 ASSAY OF TROPONIN QUANT: CPT | Performed by: INTERNAL MEDICINE

## 2018-01-04 PROCEDURE — 70450 CT HEAD/BRAIN W/O DYE: CPT

## 2018-01-04 RX ORDER — LOSARTAN POTASSIUM 50 MG/1
50 TABLET ORAL DAILY
COMMUNITY
End: 2018-01-04

## 2018-01-04 RX ORDER — NICOTINE POLACRILEX 4 MG
15 LOZENGE BUCCAL
Status: DISCONTINUED | OUTPATIENT
Start: 2018-01-04 | End: 2018-01-05

## 2018-01-04 RX ORDER — DIPHENHYDRAMINE HCL 25 MG
25 CAPSULE ORAL NIGHTLY PRN
COMMUNITY
End: 2018-02-07

## 2018-01-04 RX ORDER — LOSARTAN POTASSIUM 50 MG/1
50 TABLET ORAL EVERY EVENING
COMMUNITY

## 2018-01-04 RX ORDER — OXYBUTYNIN CHLORIDE 5 MG/1
5 TABLET ORAL 3 TIMES DAILY
COMMUNITY
End: 2018-01-04

## 2018-01-04 RX ORDER — BUSPIRONE HYDROCHLORIDE 10 MG/1
10 TABLET ORAL 3 TIMES DAILY
Status: DISCONTINUED | OUTPATIENT
Start: 2018-01-04 | End: 2018-01-11 | Stop reason: HOSPADM

## 2018-01-04 RX ORDER — BUDESONIDE 0.5 MG/2ML
0.5 INHALANT ORAL
Status: DISCONTINUED | OUTPATIENT
Start: 2018-01-05 | End: 2018-01-05

## 2018-01-04 RX ORDER — LORAZEPAM 1 MG/1
1 TABLET ORAL 2 TIMES DAILY
Status: DISCONTINUED | OUTPATIENT
Start: 2018-01-04 | End: 2018-01-04

## 2018-01-04 RX ORDER — HYDROCODONE BITARTRATE AND ACETAMINOPHEN 5; 325 MG/1; MG/1
1 TABLET ORAL EVERY 6 HOURS PRN
Status: DISCONTINUED | OUTPATIENT
Start: 2018-01-04 | End: 2018-01-11 | Stop reason: HOSPADM

## 2018-01-04 RX ORDER — ONDANSETRON 4 MG/1
4 TABLET, FILM COATED ORAL EVERY 6 HOURS PRN
Status: DISCONTINUED | OUTPATIENT
Start: 2018-01-04 | End: 2018-01-05

## 2018-01-04 RX ORDER — ACETAMINOPHEN 325 MG/1
650 TABLET ORAL EVERY 4 HOURS PRN
Status: DISCONTINUED | OUTPATIENT
Start: 2018-01-04 | End: 2018-01-11 | Stop reason: HOSPADM

## 2018-01-04 RX ORDER — FUROSEMIDE 20 MG/1
20 TABLET ORAL DAILY PRN
COMMUNITY
End: 2018-01-04

## 2018-01-04 RX ORDER — ENALAPRILAT 2.5 MG/2ML
0.62 INJECTION INTRAVENOUS EVERY 6 HOURS PRN
Status: DISCONTINUED | OUTPATIENT
Start: 2018-01-04 | End: 2018-01-05

## 2018-01-04 RX ORDER — ONDANSETRON 2 MG/ML
4 INJECTION INTRAMUSCULAR; INTRAVENOUS EVERY 6 HOURS PRN
Status: DISCONTINUED | OUTPATIENT
Start: 2018-01-04 | End: 2018-01-11 | Stop reason: HOSPADM

## 2018-01-04 RX ORDER — LACTOBACILLUS RHAMNOSUS GG 10B CELL
1 CAPSULE ORAL DAILY
COMMUNITY

## 2018-01-04 RX ORDER — IBUPROFEN 200 MG
200 TABLET ORAL EVERY 6 HOURS PRN
COMMUNITY
End: 2018-01-11 | Stop reason: HOSPADM

## 2018-01-04 RX ORDER — ALBUTEROL SULFATE 2.5 MG/3ML
2.5 SOLUTION RESPIRATORY (INHALATION) EVERY 4 HOURS PRN
COMMUNITY
End: 2018-01-04

## 2018-01-04 RX ORDER — FUROSEMIDE 10 MG/ML
20 INJECTION INTRAMUSCULAR; INTRAVENOUS ONCE
Status: COMPLETED | OUTPATIENT
Start: 2018-01-04 | End: 2018-01-04

## 2018-01-04 RX ORDER — LOSARTAN POTASSIUM 50 MG/1
50 TABLET ORAL EVERY EVENING
Status: DISCONTINUED | OUTPATIENT
Start: 2018-01-04 | End: 2018-01-04

## 2018-01-04 RX ORDER — OSELTAMIVIR PHOSPHATE 75 MG/1
75 CAPSULE ORAL EVERY 12 HOURS SCHEDULED
Status: DISCONTINUED | OUTPATIENT
Start: 2018-01-04 | End: 2018-01-04

## 2018-01-04 RX ORDER — BUDESONIDE AND FORMOTEROL FUMARATE DIHYDRATE 80; 4.5 UG/1; UG/1
2 AEROSOL RESPIRATORY (INHALATION)
Status: DISCONTINUED | OUTPATIENT
Start: 2018-01-04 | End: 2018-01-04

## 2018-01-04 RX ORDER — ALBUTEROL SULFATE 90 UG/1
2 AEROSOL, METERED RESPIRATORY (INHALATION) EVERY 4 HOURS PRN
COMMUNITY
End: 2018-01-04

## 2018-01-04 RX ORDER — GABAPENTIN 400 MG/1
800 CAPSULE ORAL EVERY 8 HOURS SCHEDULED
Status: DISCONTINUED | OUTPATIENT
Start: 2018-01-04 | End: 2018-01-04

## 2018-01-04 RX ORDER — ISOSORBIDE MONONITRATE 60 MG/1
60 TABLET, EXTENDED RELEASE ORAL DAILY
Status: DISCONTINUED | OUTPATIENT
Start: 2018-01-04 | End: 2018-01-04

## 2018-01-04 RX ORDER — CETIRIZINE HYDROCHLORIDE 10 MG/1
10 TABLET ORAL DAILY
Status: DISCONTINUED | OUTPATIENT
Start: 2018-01-04 | End: 2018-01-04

## 2018-01-04 RX ORDER — DEXTROSE MONOHYDRATE 25 G/50ML
25 INJECTION, SOLUTION INTRAVENOUS
Status: DISCONTINUED | OUTPATIENT
Start: 2018-01-04 | End: 2018-01-05

## 2018-01-04 RX ORDER — ISOSORBIDE MONONITRATE 60 MG/1
60 TABLET, EXTENDED RELEASE ORAL
Status: DISCONTINUED | OUTPATIENT
Start: 2018-01-04 | End: 2018-01-04

## 2018-01-04 RX ORDER — SODIUM CHLORIDE 9 MG/ML
75 INJECTION, SOLUTION INTRAVENOUS CONTINUOUS
Status: DISCONTINUED | OUTPATIENT
Start: 2018-01-04 | End: 2018-01-04

## 2018-01-04 RX ORDER — MAGNESIUM SULFATE HEPTAHYDRATE 40 MG/ML
2 INJECTION, SOLUTION INTRAVENOUS AS NEEDED
Status: DISCONTINUED | OUTPATIENT
Start: 2018-01-04 | End: 2018-01-05

## 2018-01-04 RX ORDER — LABETALOL HYDROCHLORIDE 5 MG/ML
10 INJECTION, SOLUTION INTRAVENOUS ONCE
Status: COMPLETED | OUTPATIENT
Start: 2018-01-04 | End: 2018-01-04

## 2018-01-04 RX ORDER — LEVOTHYROXINE SODIUM 0.05 MG/1
50 TABLET ORAL DAILY
Status: DISCONTINUED | OUTPATIENT
Start: 2018-01-04 | End: 2018-01-09

## 2018-01-04 RX ORDER — LORAZEPAM 1 MG/1
1 TABLET ORAL 2 TIMES DAILY
COMMUNITY

## 2018-01-04 RX ORDER — POTASSIUM CHLORIDE 1.5 G/1.77G
40 POWDER, FOR SOLUTION ORAL AS NEEDED
Status: DISCONTINUED | OUTPATIENT
Start: 2018-01-04 | End: 2018-01-05

## 2018-01-04 RX ORDER — POTASSIUM CHLORIDE 750 MG/1
40 CAPSULE, EXTENDED RELEASE ORAL AS NEEDED
Status: DISCONTINUED | OUTPATIENT
Start: 2018-01-04 | End: 2018-01-11 | Stop reason: HOSPADM

## 2018-01-04 RX ORDER — MAGNESIUM SULFATE HEPTAHYDRATE 40 MG/ML
4 INJECTION, SOLUTION INTRAVENOUS AS NEEDED
Status: DISCONTINUED | OUTPATIENT
Start: 2018-01-04 | End: 2018-01-05

## 2018-01-04 RX ORDER — ASPIRIN 81 MG/1
81 TABLET ORAL DAILY
Status: DISCONTINUED | OUTPATIENT
Start: 2018-01-04 | End: 2018-01-11 | Stop reason: HOSPADM

## 2018-01-04 RX ORDER — PANTOPRAZOLE SODIUM 40 MG/1
40 TABLET, DELAYED RELEASE ORAL DAILY
Status: DISCONTINUED | OUTPATIENT
Start: 2018-01-04 | End: 2018-01-11 | Stop reason: HOSPADM

## 2018-01-04 RX ORDER — MAGNESIUM HYDROXIDE/ALUMINUM HYDROXICE/SIMETHICONE 120; 1200; 1200 MG/30ML; MG/30ML; MG/30ML
30 SUSPENSION ORAL EVERY 6 HOURS PRN
COMMUNITY
End: 2018-02-07

## 2018-01-04 RX ORDER — MONTELUKAST SODIUM 10 MG/1
10 TABLET ORAL NIGHTLY
Status: DISCONTINUED | OUTPATIENT
Start: 2018-01-04 | End: 2018-01-11 | Stop reason: HOSPADM

## 2018-01-04 RX ORDER — LORATADINE 10 MG/1
10 TABLET ORAL DAILY
COMMUNITY
End: 2018-02-07

## 2018-01-04 RX ORDER — LOSARTAN POTASSIUM 50 MG/1
100 TABLET ORAL
Status: DISCONTINUED | OUTPATIENT
Start: 2018-01-04 | End: 2018-01-04

## 2018-01-04 RX ORDER — HYDROCODONE BITARTRATE AND ACETAMINOPHEN 5; 325 MG/1; MG/1
1 TABLET ORAL EVERY 6 HOURS PRN
COMMUNITY
End: 2018-02-07

## 2018-01-04 RX ORDER — LOSARTAN POTASSIUM 100 MG/1
100 TABLET ORAL EVERY MORNING
COMMUNITY
End: 2018-02-07

## 2018-01-04 RX ORDER — SODIUM CHLORIDE 0.9 % (FLUSH) 0.9 %
1-10 SYRINGE (ML) INJECTION AS NEEDED
Status: DISCONTINUED | OUTPATIENT
Start: 2018-01-04 | End: 2018-01-11 | Stop reason: HOSPADM

## 2018-01-04 RX ORDER — IPRATROPIUM BROMIDE AND ALBUTEROL SULFATE 2.5; .5 MG/3ML; MG/3ML
3 SOLUTION RESPIRATORY (INHALATION)
Status: DISCONTINUED | OUTPATIENT
Start: 2018-01-04 | End: 2018-01-11 | Stop reason: HOSPADM

## 2018-01-04 RX ORDER — MAGNESIUM HYDROXIDE/ALUMINUM HYDROXICE/SIMETHICONE 120; 1200; 1200 MG/30ML; MG/30ML; MG/30ML
30 SUSPENSION ORAL EVERY 6 HOURS PRN
Status: DISCONTINUED | OUTPATIENT
Start: 2018-01-04 | End: 2018-01-05

## 2018-01-04 RX ORDER — ASPIRIN 81 MG/1
324 TABLET, CHEWABLE ORAL ONCE
Status: COMPLETED | OUTPATIENT
Start: 2018-01-04 | End: 2018-01-04

## 2018-01-04 RX ORDER — ATORVASTATIN CALCIUM 10 MG/1
10 TABLET, FILM COATED ORAL DAILY
Status: DISCONTINUED | OUTPATIENT
Start: 2018-01-04 | End: 2018-01-10

## 2018-01-04 RX ORDER — IBUPROFEN 400 MG/1
200 TABLET ORAL EVERY 6 HOURS PRN
Status: DISCONTINUED | OUTPATIENT
Start: 2018-01-04 | End: 2018-01-05

## 2018-01-04 RX ORDER — ARFORMOTEROL TARTRATE 15 UG/2ML
15 SOLUTION RESPIRATORY (INHALATION)
Status: DISCONTINUED | OUTPATIENT
Start: 2018-01-05 | End: 2018-01-05

## 2018-01-04 RX ORDER — POTASSIUM CHLORIDE 7.45 MG/ML
10 INJECTION INTRAVENOUS
Status: DISCONTINUED | OUTPATIENT
Start: 2018-01-04 | End: 2018-01-05

## 2018-01-04 RX ORDER — LOSARTAN POTASSIUM 50 MG/1
100 TABLET ORAL EVERY MORNING
Status: DISCONTINUED | OUTPATIENT
Start: 2018-01-05 | End: 2018-01-04

## 2018-01-04 RX ORDER — L.ACID,PARA/B.BIFIDUM/S.THERM 8B CELL
1 CAPSULE ORAL DAILY
Status: DISCONTINUED | OUTPATIENT
Start: 2018-01-04 | End: 2018-01-05

## 2018-01-04 RX ORDER — ISOSORBIDE MONONITRATE 60 MG/1
60 TABLET, EXTENDED RELEASE ORAL DAILY
COMMUNITY

## 2018-01-04 RX ORDER — MAGNESIUM SULFATE HEPTAHYDRATE 40 MG/ML
2 INJECTION, SOLUTION INTRAVENOUS AS NEEDED
Status: DISCONTINUED | OUTPATIENT
Start: 2018-01-04 | End: 2018-01-11 | Stop reason: HOSPADM

## 2018-01-04 RX ORDER — LORAZEPAM 1 MG/1
1 TABLET ORAL EVERY 6 HOURS PRN
Status: DISCONTINUED | OUTPATIENT
Start: 2018-01-04 | End: 2018-01-11 | Stop reason: HOSPADM

## 2018-01-04 RX ORDER — SODIUM CHLORIDE 0.9 % (FLUSH) 0.9 %
10 SYRINGE (ML) INJECTION AS NEEDED
Status: DISCONTINUED | OUTPATIENT
Start: 2018-01-04 | End: 2018-01-11 | Stop reason: HOSPADM

## 2018-01-04 RX ADMIN — SODIUM CHLORIDE 500 ML: 9 INJECTION, SOLUTION INTRAVENOUS at 20:40

## 2018-01-04 RX ADMIN — SODIUM CHLORIDE 75 ML/HR: 9 INJECTION, SOLUTION INTRAVENOUS at 16:30

## 2018-01-04 RX ADMIN — DOXYCYCLINE 100 MG: 100 INJECTION, POWDER, LYOPHILIZED, FOR SOLUTION INTRAVENOUS at 17:58

## 2018-01-04 RX ADMIN — ISOSORBIDE MONONITRATE 60 MG: 60 TABLET, EXTENDED RELEASE ORAL at 15:05

## 2018-01-04 RX ADMIN — LORAZEPAM 1 MG: 1 TABLET ORAL at 12:23

## 2018-01-04 RX ADMIN — ASPIRIN 81 MG 324 MG: 81 TABLET ORAL at 15:39

## 2018-01-04 RX ADMIN — OSELTAMIVIR PHOSPHATE 75 MG: 75 CAPSULE ORAL at 17:57

## 2018-01-04 RX ADMIN — LEVOTHYROXINE SODIUM 50 MCG: 50 TABLET ORAL at 15:39

## 2018-01-04 RX ADMIN — BUSPIRONE HYDROCHLORIDE 10 MG: 10 TABLET ORAL at 15:39

## 2018-01-04 RX ADMIN — SODIUM CHLORIDE 250 ML: 9 INJECTION, SOLUTION INTRAVENOUS at 20:20

## 2018-01-04 RX ADMIN — CETIRIZINE HYDROCHLORIDE 10 MG: 10 TABLET, FILM COATED ORAL at 15:39

## 2018-01-04 RX ADMIN — ACETAMINOPHEN 650 MG: 325 TABLET, FILM COATED ORAL at 15:05

## 2018-01-04 RX ADMIN — FUROSEMIDE 20 MG: 10 INJECTION, SOLUTION INTRAMUSCULAR; INTRAVENOUS at 22:17

## 2018-01-04 RX ADMIN — GABAPENTIN 800 MG: 400 CAPSULE ORAL at 15:39

## 2018-01-04 RX ADMIN — IPRATROPIUM BROMIDE AND ALBUTEROL SULFATE 3 ML: .5; 3 SOLUTION RESPIRATORY (INHALATION) at 16:33

## 2018-01-04 RX ADMIN — IBUPROFEN 200 MG: 400 TABLET ORAL at 16:53

## 2018-01-04 RX ADMIN — MAGNESIUM SULFATE IN WATER 4 G: 40 INJECTION, SOLUTION INTRAVENOUS at 22:25

## 2018-01-04 RX ADMIN — LOSARTAN POTASSIUM 50 MG: 50 TABLET, FILM COATED ORAL at 17:00

## 2018-01-04 RX ADMIN — IPRATROPIUM BROMIDE AND ALBUTEROL SULFATE 3 ML: .5; 3 SOLUTION RESPIRATORY (INHALATION) at 19:31

## 2018-01-04 RX ADMIN — Medication 1 CAPSULE: at 15:39

## 2018-01-04 RX ADMIN — BUDESONIDE AND FORMOTEROL FUMARATE DIHYDRATE 2 PUFF: 80; 4.5 AEROSOL RESPIRATORY (INHALATION) at 19:32

## 2018-01-04 RX ADMIN — PANTOPRAZOLE SODIUM 40 MG: 40 TABLET, DELAYED RELEASE ORAL at 15:39

## 2018-01-04 RX ADMIN — ENOXAPARIN SODIUM 40 MG: 40 INJECTION SUBCUTANEOUS at 15:05

## 2018-01-04 RX ADMIN — SODIUM CHLORIDE 250 ML: 9 INJECTION, SOLUTION INTRAVENOUS at 19:31

## 2018-01-04 RX ADMIN — ATORVASTATIN CALCIUM 10 MG: 10 TABLET, FILM COATED ORAL at 15:39

## 2018-01-04 RX ADMIN — LABETALOL HYDROCHLORIDE 10 MG: 5 INJECTION INTRAVENOUS at 10:57

## 2018-01-04 RX ADMIN — LORAZEPAM 1 MG: 1 TABLET ORAL at 18:04

## 2018-01-04 NOTE — H&P
[facility] Medicine Services  HISTORY AND PHYSICAL    Patient Name: Virgie Arroyo  : 1945  MRN: [id number]  Primary Care Physician: Nick Mcginnis MD    Subjective   Subjective     Chief Complaint:  Syncopal event    HPI:  Virgie Arroyo is a 72 y.o. female with past medical history postive for hypertension, hyperlipidemia, diastolic dysfunction, severe COPD with oxygen dependency, obstructive sleep apnea on CPAP who was recently admitted to [facility] , L 22,017 to 2017.  She was discharged on an extended steroid taper with follow-up to the [facility] in one month.  She was discharged to [facility] for ongoing rehabilitation services.  She returns 2018 after syncopal event where she was walking unassisted to the restroom.  Patient states she felt lightheaded and blacked out.  When she awoke she was on the floor surrounded by staff.  Patient denies injury with fall.     Note, patient states she has been doing fairly well at [facility].  Strength is not returning as she would like however breathing has been better up until approximately 24 hours ago.  She began to have nonproductive Dry cough with increased wheezing that was mild in dyspnea on exertion.  An presentation to ED workup reveals CT head without acute abnormality, chest x-ray appears to be stable, possibly improved from last admission.  He did have noted rising troponin from 0.07-0.15 and bnp 311.  Patient was sent to her admission room began to have chills with a noted fever of 101.6 a patient does state  recently diagnosed with influenza.  She also endorses increased urinary frequency, but denies dysuria, abdominal pain, nausea, vomiting, diarrhea.  Patient states appetite has been normal and no reported prior fever or chills.    Patient to be admitted for further workup and management      Review of Systems   Constitutional: Positive for activity change, chills, fatigue  and fever. Negative for appetite change.   HENT: Positive for congestion.    Eyes: Negative.    Respiratory: Positive for cough, shortness of breath and wheezing.    Cardiovascular: Positive for palpitations and leg swelling. Negative for chest pain.   Gastrointestinal: Negative.    Genitourinary: Positive for frequency.   Musculoskeletal: Negative.    Neurological: Positive for dizziness, syncope, weakness and light-headedness.   Psychiatric/Behavioral: Negative.           Otherwise 10-system ROS reviewed and is negative except as mentioned in the HPI.    Personal History     Past Medical History:   Diagnosis Date   • COPD, severe    • Dependence on supplemental oxygen    • Depression with anxiety    • Diastolic congestive heart failure    • Genital herpes    • GERD (gastroesophageal reflux disease)    • Hiatal hernia    • History of alcoholism    • History of cervical cancer    • History of ileus 05/05/2013   • HLD (hyperlipidemia)    • Hypertension    • Hypothyroidism    • Osteoarthritis    • Seasonal allergies        Past Surgical History:   Procedure Laterality Date   • APPENDECTOMY  2013   • CATARACT EXTRACTION Bilateral 2017   • DILATATION AND CURETTAGE     • TONSILLECTOMY     • TOTAL HIP ARTHROPLASTY Right        Family History: family history includes Heart attack in her father; Stroke in her mother.     Social History:  reports that she quit smoking about 16 years ago. Her smoking use included Cigarettes. She started smoking about 46 years ago. She has a 60.00 pack-year smoking history. She has never used smokeless tobacco. She reports that she drinks about 8.4 oz of alcohol per week  She reports that she does not use illicit drugs.  Social History     Social History Narrative       Medications:  Prescriptions Prior to Admission   Medication Sig Dispense Refill Last Dose   • aluminum-magnesium hydroxide-simethicone (MAALOX/MYLANTA) 200-200-20 MG/5ML suspension Take 30 mL by mouth Every 6 (Six) Hours As  Needed for Indigestion or Heartburn.      • aspirin 81 MG EC tablet Take 81 mg by mouth daily.   Taking   • busPIRone (BUSPAR) 10 MG tablet Take 10 mg by mouth 3 (three) times a day.   Taking   • diphenhydrAMINE (BENADRYL) 25 mg capsule Take 25 mg by mouth At Night As Needed for Sleep.      • Fluticasone Furoate-Vilanterol (BREO ELLIPTA) 100-25 MCG/INH aerosol powder  Inhale 1 puff Daily.   Taking   • furosemide (LASIX) 40 MG tablet Take 40 mg by mouth Daily.   Taking   • gabapentin (NEURONTIN) 800 MG tablet Take 800 mg by mouth 3 (Three) Times a Day.   Taking   • HYDROcodone-acetaminophen (NORCO) 5-325 MG per tablet Take 1 tablet by mouth Every 6 (Six) Hours As Needed.      • ibuprofen (ADVIL,MOTRIN) 200 MG tablet Take 200 mg by mouth Every 6 (Six) Hours As Needed for Mild Pain .      • isosorbide mononitrate (IMDUR) 60 MG 24 hr tablet Take 60 mg by mouth Daily.      • levothyroxine (SYNTHROID, LEVOTHROID) 50 MCG tablet Take 50 mcg by mouth daily.   Taking   • loratadine (CLARITIN) 10 MG tablet Take 10 mg by mouth Daily.      • LORazepam (ATIVAN) 1 MG tablet Take 1 mg by mouth 2 (Two) Times a Day.      • losartan (COZAAR) 100 MG tablet Take 100 mg by mouth Every Morning.      • losartan (COZAAR) 50 MG tablet Take 50 mg by mouth Every Evening.      • meloxicam (MOBIC) 7.5 MG tablet Take 7.5 mg by mouth 2 (Two) Times a Day.   Taking   • metoprolol tartrate (LOPRESSOR) 25 MG tablet Take 1 tablet by mouth Every 12 (Twelve) Hours.      • montelukast (SINGULAIR) 10 MG tablet Take 10 mg by mouth every night.   Taking   • pantoprazole (PROTONIX) 40 MG EC tablet Take 40 mg by mouth daily.   Taking   • pravastatin (PRAVACHOL) 40 MG tablet Take 40 mg by mouth daily.   Taking   • predniSONE (DELTASONE) 50 MG tablet 50mg daily x 3 days, 40mg daily x 3 days, 20mg daily x 3 days, 10mg daily x 3 days, then stop      • probiotic (CULTURELLE) capsule capsule Take 1 capsule by mouth Daily.      • Umeclidinium Bromide 62.5 MCG/INH  aerosol powder  Inhale 1 puff Daily. 1 inhalation once a day 1 each 5 Taking       No Known Allergies    Objective   Objective     Vital Signs:   Temp:  [98.5 °F (36.9 °C)-101.6 °F (38.7 °C)] 101.6 °F (38.7 °C)  Heart Rate:  [67-91] 82  Resp:  [22-24] 24  BP: (178-220)/() 212/165        Physical Exam   Constitutional: No acute distress, awake, alert, rigors, appears ill  HENT: NCAT, mucous membranes moist  Respiratory: Clear to auscultation, but diminished bilaterally, intermittent panting/ pursed lip  Cardiovascular: RRR, no murmurs, rubs, or gallops, palpable pedal pulses bilaterally  Gastrointestinal: Positive bowel sounds, soft, nontender, nondistended  Musculoskeletal: No bilateral ankle edema  Psychiatric: Appropriate affect, cooperative  Neurologic: Oriented x 3, non focal weakness, Cranial Nerves grossly intact to confrontation, speech clear  Skin: No rashes      Results Reviewed:  I have personally reviewed current lab, radiology, and data and agree.      Results from last 7 days  Lab Units 01/04/18  0643   WBC 10*3/mm3 9.48   HEMOGLOBIN g/dL 11.8   HEMATOCRIT % 37.2   PLATELETS 10*3/mm3 269       Results from last 7 days  Lab Units 01/04/18  0643   SODIUM mmol/L 135   POTASSIUM mmol/L 4.2   CHLORIDE mmol/L 94*   CO2 mmol/L 36.0*   BUN mg/dL 20   CREATININE mg/dL 0.80   GLUCOSE mg/dL 90   CALCIUM mg/dL 9.3   ALT (SGPT) U/L 42*   AST (SGOT) U/L 26     Brief Urine Lab Results  (Last result in the past 365 days)      Color   Clarity   Blood   Leuk Est   Nitrite   Protein   CREAT   Urine HCG        01/04/18 1356 Yellow Clear Negative Negative Negative Negative             BNP   Date Value Ref Range Status   01/04/2018 311.0 (H) 0.0 - 100.0 pg/mL Final     No results found for: PHART  Imaging Results (last 24 hours)     Procedure Component Value Units Date/Time    XR Chest 1 View [[id number]] Collected:  01/04/18 0639     Updated:  01/04/18 0818    Narrative:       EXAM:    XR Chest, 1 View    CLINICAL  HISTORY:    72 years old, female; Pain; Chest pain; Other: General chest pain; Patient   HX: Chest pain triage protocol HX: Hypertension hypothyroidism dependence on   supplemental oxygen secondary pulmonary arterial hypertension copd, severe   depression with anxiety diastolic congestive heart failure gerd   (gastroesophageal reflux disease) hld (hyperlipidemia) copd with exacerbation   acute cystitis without hematuria acute respiratory failure with hypoxia chronic   hyponatremia    TECHNIQUE:    Frontal view of the chest.    COMPARISON:    [facility] - XR CHEST PA AND LATERAL 2017-12-21 12:22    FINDINGS:    Mild cardiomegaly, similar to prior study. Thoracic aorta is mildly tortuous.   Mild patchy airspace opacities (atelectasis and/or consolidation), decreased   from prior study. No visible pneumothorax or pleural effusion. Mild pulmonary   vascular congestion, similar to prior study.      Impression:       1. Mild patchy airspace opacities (atelectasis and/or consolidation), decreased   from prior study.  2. Mild pulmonary vascular congestion, similar to prior study.    THIS DOCUMENT HAS BEEN ELECTRONICALLY SIGNED BY KEDAR BREWER MD    CT Head Without Contrast [[id number]] Collected:  01/04/18 0813     Updated:  01/04/18 1008    Narrative:       EXAMINATION: CT HEAD WO CONTRAST-      INDICATION: Syncope.      TECHNIQUE: Axial CT of the head without intravenous contrast  administration.     The radiation dose reduction device was turned on for each scan per the  ALARA (As Low as Reasonably Achievable) protocol.     COMPARISON: None.     FINDINGS: Midline structures are symmetric without evidence of mass,  mass effect or midline shift. No intraaxial hemorrhage or extraaxial  fluid collection. Ventricles and sulci are prominent with noted  hypoplastic or aplastic rostral portion of the corpus callosum  concerning for partial congenital absence. Moderate attenuation changes  within the periventricular white matter  consistent with chronic small  vessel ischemic disease. Globes and orbits unremarkable. Visualized  paranasal sinuses and mastoid air cells are grossly clear and well  pneumatized. Calvarium intact.       Impression:       1. No acute intracranial abnormality.  2. Moderate chronic small vessel ischemic changes and changes of atrophy  of the supratentorial white matter.  3. 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     D:  01/04/2018  E:  01/04/2018     This report was finalized on 1/4/2018 10:06 AM by Dr. Quirino Garcia.           Results for orders placed during the hospital encounter of 12/20/17   Adult Transthoracic Echo Complete W/ Cont if Necessary Per Protocol    Narrative · Left ventricular systolic function is normal.  · Estimated EF appears to be in the range of 66 - 70%.  · Left ventricular diastolic dysfunction (grade I) consistent with   impaired relaxation.  · Left ventricular wall thickness is consistent with mild concentric   hypertrophy.  · Mild aortic valve regurgitation is present.  · Mild to moderate tricuspid valve regurgitation is present.  · Estimated right ventricular systolic pressure from tricuspid   regurgitation is moderately elevated (45-55 mmHg).          Assessment/Plan   Assessment / Plan     Hospital Problem List     * (Principal)Syncope and collapse    Elevated troponin    Febrile illness, acute    COPD, severe    Hypertension    Hypothyroidism    Dependence on supplemental oxygen    Overview Signed 8/5/2016 10:21 AM by Jake Mtz     · A.  4 L at rest and up to 6 with exertion.            Secondary pulmonary arterial hypertension    Overview Signed 12/21/2017  4:58 PM by William Parr MD     Group 3 due to COPD         Depression with anxiety    Diastolic congestive heart failure    GERD (gastroesophageal reflux disease)    HLD (hyperlipidemia)        72 year old woman sent from  [facility] after having syncope this morning upon getting out of bed.     Assessment & Plan:  -- febrile illness: blood cx, lactic and proc pending. Check u/a, influenza and resp cx  -- trend troponin. Cardiology consulted and felt increase is due to illness rather than myocardial ischemia  -- resume home BP meds and give now, as she missed am doses  -- resume home O2/ cpap with scheduled nebs/ prn  -- continue prednisone taper as started last week: 30mg x today, 20mg x until 1/7, 10mg until 1/10-stop  -- add prn IV vasotec for HTN    Awaiting results of influenza screen/ u/a prior to ordering tamiflu vs anitbiotics. CXR stable without noted pneumonia    DVT prophylaxis:lov sq    CODE STATUS:  Full Code    Admission Status:  I believe this patient meets INPATIENT status due to the need for care which can only be reasonably provided in an hospital setting such as aggressive/expedited ancillary services and/or consultation services, the necessity for IV medications, close physician monitoring and/or the possible need for procedures.  In such, I feel patient’s risk for adverse outcomes and need for care warrant INPATIENT evaluation and predict the patient’s care encounter to likely last beyond 2 midnights.      Virgie Gutierrez, APRN   01/04/18   2:55 PM    MD addendum:  72 yr old woman witih COPD, recently at [facility] and DCd to [facility] on a steroid taper.  Here with fever and dry cough;  has the flu.  Had syncope at [facility] this morning.  Elev troponin without chest pain.     O. Constitutional: No acute distress, awake, alert, chatty and pleasant  Eyes: PERRLA, sclerae anicteric, no conjunctival injection  HENT: NCAT, mucous membranes moist  Neck: Supple, no thyromegaly, no lymphadenopathy, trachea midline  Respiratory: Getting neb currently - mildly tachypneic, dec at bases, no wheeze, nonlabored and no accessory muscle use.  Cardiovascular: RRR, no murmurs, rubs, or gallops,   Gastrointestinal: Positive bowel sounds,  soft, nontender, nondistended  Musculoskeletal: No bilateral ankle edema, no clubbing or cyanosis to extremities  Psychiatric: Appropriate affect, cooperative  Neurologic: Oriented x 3, strength symmetric in all extremities, Cranial Nerves grossly intact to confrontation, speech clear  Skin: No rashes    A/p    Flu-like illness in woman with COPD (3 liters at home) and flu exposure and stable CXR.   -- despite negative PCR, will treat empirically with tamiflu given high pretest probability and suspicion of false negative  -- supportive care (nebs, steroids)  -- treat with doxycycline given severe COPD  -- continue PT  -- repeat CXR in AM    Elev trop  -- probable NSTEMI type 2  -- Cards consult appreciated    Syncope during acute illness in frail woman  -- watch on tele  -- hydrate gently    Otherwise as above.

## 2018-01-04 NOTE — CONSULTS
[facility] at [facility]   Consult Note    Referring Provider: Dr. Hurst    Reason for Consultation: elevated troponin, syncope, hypertension, history of CHF    Patient Care Team:  Nick Mcginnis MD as PCP - General (Family Medicine)     Problem List:  1. Syncope 1/18  1. ECHO 12/21/17 EF 66-70% Mild AR. Mild to mod TR. RVSP 45-55 mmHg.  2. Elevated troponin  1. Mild elevation  3. Hypertension  4. Dyslipidemia  5. History of diastolic CHF  6. Severe COPD  7. Anxiety/depression  8. Genital herpes  9. History of alcoholism  10. Arthritis  11. History of cervical cancer  12. Esophageal dysmotility  13. History of ileus 2014  14. Hypothyroidism   15. Surgeries:  1. Appendectomy  2. Bilateral cataract extraction  3. D&C  4. Tonsillectomy  5. Right hip arthroplasty      No Known Allergies        Current Facility-Administered Medications:   •  aspirin chewable tablet 324 mg, 324 mg, Oral, Once, Allen Barbosa MD  •  LORazepam (ATIVAN) tablet 1 mg, 1 mg, Oral, Q6H PRN, Allen Barbosa MD, 1 mg at 01/04/18 1223  •  sodium chloride 0.9 % flush 10 mL, 10 mL, Intravenous, PRN, Allen Barbosa MD         Prescriptions Prior to Admission   Medication Sig Dispense Refill Last Dose   • aluminum-magnesium hydroxide-simethicone (MAALOX/MYLANTA) 200-200-20 MG/5ML suspension Take 30 mL by mouth Every 6 (Six) Hours As Needed for Indigestion or Heartburn.      • aspirin 81 MG EC tablet Take 81 mg by mouth daily.   Taking   • busPIRone (BUSPAR) 10 MG tablet Take 10 mg by mouth 3 (three) times a day.   Taking   • diphenhydrAMINE (BENADRYL) 25 mg capsule Take 25 mg by mouth At Night As Needed for Sleep.      • Fluticasone Furoate-Vilanterol (BREO ELLIPTA) 100-25 MCG/INH aerosol powder  Inhale 1 puff Daily.   Taking   • furosemide (LASIX) 40 MG tablet Take 40 mg by mouth Daily.   Taking   • gabapentin (NEURONTIN) 800 MG tablet Take 800 mg by mouth 3 (Three) Times a Day.   Taking   • HYDROcodone-acetaminophen (NORCO)  5-325 MG per tablet Take 1 tablet by mouth Every 6 (Six) Hours As Needed.      • ibuprofen (ADVIL,MOTRIN) 200 MG tablet Take 200 mg by mouth Every 6 (Six) Hours As Needed for Mild Pain .      • isosorbide mononitrate (IMDUR) 60 MG 24 hr tablet Take 60 mg by mouth Daily.      • levothyroxine (SYNTHROID, LEVOTHROID) 50 MCG tablet Take 50 mcg by mouth daily.   Taking   • loratadine (CLARITIN) 10 MG tablet Take 10 mg by mouth Daily.      • LORazepam (ATIVAN) 1 MG tablet Take 1 mg by mouth 2 (Two) Times a Day.      • losartan (COZAAR) 100 MG tablet Take 100 mg by mouth Every Morning.      • losartan (COZAAR) 50 MG tablet Take 50 mg by mouth Every Evening.      • meloxicam (MOBIC) 7.5 MG tablet Take 7.5 mg by mouth 2 (Two) Times a Day.   Taking   • metoprolol tartrate (LOPRESSOR) 25 MG tablet Take 1 tablet by mouth Every 12 (Twelve) Hours.      • montelukast (SINGULAIR) 10 MG tablet Take 10 mg by mouth every night.   Taking   • pantoprazole (PROTONIX) 40 MG EC tablet Take 40 mg by mouth daily.   Taking   • pravastatin (PRAVACHOL) 40 MG tablet Take 40 mg by mouth daily.   Taking   • predniSONE (DELTASONE) 50 MG tablet 50mg daily x 3 days, 40mg daily x 3 days, 20mg daily x 3 days, 10mg daily x 3 days, then stop      • probiotic (CULTURELLE) capsule capsule Take 1 capsule by mouth Daily.      • Umeclidinium Bromide 62.5 MCG/INH aerosol powder  Inhale 1 puff Daily. 1 inhalation once a day 1 each 5 Taking         Subjective .   History of present illness:      Patient is a 72-year-old  female who we are seeing today for further evaluation of elevated troponin, syncope, and hypertension.  She was recently admitted last month for COPD exacerbation and then was discharged to [facility].  She had been there ever since.  His morning she woke up and was ambulating to get to the bathroom and suffered a syncopal episode.  Patient notes that she did not have any warning prior to this.  She was  "brought to the emergency department for further evaluation.  In the emergency department she was noted to be significantly hypertensive.  She still continues to be hypertensive.  She notes that she's been complaining of some abdominal pain and feels \"like I have the flu\".  She is complained of increased urinary frequency.  She has chronic shortness of breath.  Due to her syncope, elevated sugar and hypertension we were asked to see the patient for further evaluation.  Currently is not having any chest pain.  Currently is undergoing evaluation for her febrile illness.  Main complaint currently is lower abdominal discomfort, and urinary urgency      Social History     Social History   • Marital status:      Spouse name: Michelet   • Number of children: 3   • Years of education: H.S.     Occupational History   •  Disabled     Social History Main Topics   • Smoking status: Former Smoker     Packs/day: 2.00     Years: 30.00     Types: Cigarettes     Start date: 1972     Quit date: 2002   • Smokeless tobacco: Never Used   • Alcohol use 8.4 oz/week     14 Cans of beer per week   • Drug use: No   • Sexual activity: Not Currently     Partners: Male     Other Topics Concern   • Not on file     Social History Narrative     Family History   Problem Relation Age of Onset   • Heart attack Father    • Stroke Mother          Review of Systems:  Review of Systems   Constitution: Positive for weakness and malaise/fatigue. Negative for fever.   HENT: Negative for hoarse voice, nosebleeds and tinnitus.    Eyes: Negative for pain, redness and visual disturbance.   Cardiovascular: Positive for syncope. Negative for chest pain, claudication, cyanosis, dyspnea on exertion, irregular heartbeat, leg swelling, near-syncope, orthopnea, palpitations and paroxysmal nocturnal dyspnea.   Respiratory: Positive for cough and shortness of breath. Negative for hemoptysis, sleep disturbances due to breathing, snoring, sputum production and " "wheezing.    Endocrine: Negative for polydipsia and polyuria.   Hematologic/Lymphatic: Negative for bleeding problem. Does not bruise/bleed easily.   Skin: Negative for flushing, itching, rash and skin cancer.   Musculoskeletal: Positive for falls and joint pain. Negative for arthritis, back pain, joint swelling, muscle cramps and myalgias.   Gastrointestinal: Positive for abdominal pain. Negative for anorexia, constipation, diarrhea, heartburn, hematemesis, hematochezia, melena, nausea and vomiting.   Genitourinary: Positive for frequency. Negative for bladder incontinence, dysuria and hematuria.   Neurological: Negative for disturbances in coordination, excessive daytime sleepiness, dizziness, focal weakness, headaches, light-headedness, loss of balance, numbness, seizures, tremors and vertigo.   Psychiatric/Behavioral: Negative for altered mental status, depression, memory loss and substance abuse. The patient is not nervous/anxious.    Allergic/Immunologic: Negative for hives and persistent infections.              Objective   Vitals:  Blood pressure (!) 212/165, pulse 82, temperature (!) 101.6 °F (38.7 °C), resp. rate 24, height 152.4 cm (60\"), weight 83.2 kg (183 lb 6 oz), SpO2 93 %.       Physical Exam   Constitutional: She is oriented to person, place, and time. She appears well-developed and well-nourished.   HENT:   Head: Normocephalic and atraumatic.   Mouth/Throat: Oropharynx is clear and moist.   Eyes: Conjunctivae are normal. Pupils are equal, round, and reactive to light.   Neck: Normal carotid pulses and no JVD present. Carotid bruit is not present. No thyromegaly present.   Cardiovascular: Normal rate, regular rhythm, S1 normal and S2 normal.  Exam reveals no gallop and no friction rub.    No murmur heard.  Pulses:       Carotid pulses are 2+ on the right side, and 2+ on the left side.       Dorsalis pedis pulses are 2+ on the right side, and 2+ on the left side.        Posterior tibial pulses are " 2+ on the right side, and 2+ on the left side.   Pulmonary/Chest: No respiratory distress. She has decreased breath sounds. She has no wheezes. She has rhonchi. She has no rales. She exhibits no tenderness.   Abdominal: She exhibits no distension, no abdominal bruit and no mass. There is no hepatosplenomegaly. There is no tenderness. There is no rebound.   Musculoskeletal: She exhibits no edema, tenderness or deformity.   Lymphadenopathy:     She has no cervical adenopathy.   Neurological: She is alert and oriented to person, place, and time. She has normal strength.   Skin: Skin is warm and dry. No rash noted. No cyanosis. Nails show no clubbing.   Psychiatric: She has a normal mood and affect. Cognition and memory are normal.            Results Review:  I reviewed the patient's new clinical results.    Results from last 7 days  Lab Units 01/04/18  0643   WBC 10*3/mm3 9.48   HEMOGLOBIN g/dL 11.8   HEMATOCRIT % 37.2   PLATELETS 10*3/mm3 269       Results from last 7 days  Lab Units 01/04/18  0643   SODIUM mmol/L 135   POTASSIUM mmol/L 4.2   CHLORIDE mmol/L 94*   CO2 mmol/L 36.0*   BUN mg/dL 20   CREATININE mg/dL 0.80   CALCIUM mg/dL 9.3   BILIRUBIN mg/dL 0.5   ALK PHOS U/L 74   ALT (SGPT) U/L 42*   AST (SGOT) U/L 26   GLUCOSE mg/dL 90       Results from last 7 days  Lab Units 01/04/18  0643   SODIUM mmol/L 135   POTASSIUM mmol/L 4.2   CHLORIDE mmol/L 94*   CO2 mmol/L 36.0*   BUN mg/dL 20   CREATININE mg/dL 0.80   GLUCOSE mg/dL 90   CALCIUM mg/dL 9.3         No results found for: TROPONINI                    Tele:  SR    EKG: SR      Assessment/Plan     1. Syncope  2. Febrile illness  3. Hypertension, uncontrolled  4. Gray zone troponin. Likely reactive to current illness.  5. Severe COPD with recent hospitalization for exacerbation.  6. History of diastolic congestive heart failure      Plan:    1. Positive troponin, no clinical evidence of ischemia.  Normal left ventricular systolic function by echo last month.   Suspect this is due to her recent illness and not true myocardial ischemia.  We'll simply trend for now.  2. Syncope, sounds vasovagal and related to what appears to be abdominal pain and possible urosepsis.  3. Fever currently being evaluated  4. Severe/end-stage COPD with chronic 4 L of oxygen at home  5. Hypertension uncontrolled, will resume her home medicines first disappear she has not gotten these today.  6. History of diastolic heart failure, currently appears euvolemic      IRVING Sosa obtained past medical, family history, social history, review of systems and functioned as a scribe for the remainder of the dictation for Dr. Newsome.      IRVING Sosa  01/04/18  1:50 PM    Dictated utilizing Dragon VibeSecation

## 2018-01-04 NOTE — ED PROVIDER NOTES
Subjective   HPI Comments: This patient is a 72-year-old female with a history of COPD who is on chronic supplemental oxygen.  She is currently at [facility] after a COPD exacerbation that led to admission here.  She tells me that she got out of bed this morning to go to the bathroom and had a syncopal episode soon after standing.  She walks with a walker.  She did not suffer head, neck, back, or extremity injury.  She had some chest pain immediately after the episode but currently has no chest pain.  She denies hemoptysis or hematemesis.  Denies any history of pulmonary embolus.  She reports that she feels fine at this time and has no headache, vision changes, difficulty with cognition, chest pain, shortness of breath or any other concerns.  She tells me quite actively that she would like to go back to the rehabilitation hospital if at all possible.  Her primary care physician is Dr. Jorge Ayon.  She has a history of CHF but does not have a cardiologist.    Past Medical History:  COPD, hypertension, hypothyroidism, genital herpes, cervical cancer, congestive heart failure, hyperlipidemia, GERD, appendectomy, tonsillectomy, total right hip arthroplasty.      Past Family History:  MI (father).  No family history of Brugada's syndrome or sudden cardiac death.    Patient is a 72 y.o. female presenting with syncope.   History provided by:  Patient  Syncope   Episode history:  Single  Most recent episode:  Today  Timing:  Constant  Progression:  Resolved  Chronicity:  New  Witnessed: yes    Relieved by:  None tried  Worsened by:  Nothing  Ineffective treatments:  None tried  Associated symptoms: chest pain    Associated symptoms: no confusion, no fever, no headaches, no nausea, no palpitations, no seizures, no shortness of breath and no vomiting        Review of Systems   Constitutional: Negative for chills, fatigue, fever and unexpected weight change.   HENT: Negative for dental problem,  ear pain, hearing loss and sinus pressure.    Eyes: Negative for pain and visual disturbance.   Respiratory: Negative for chest tightness and shortness of breath.    Cardiovascular: Positive for chest pain and syncope. Negative for palpitations and leg swelling.   Gastrointestinal: Negative for diarrhea, nausea and vomiting.   Genitourinary: Negative for difficulty urinating, dyspareunia, hematuria and urgency.   Musculoskeletal: Negative for myalgias, neck pain and neck stiffness.   Neurological: Negative for seizures, syncope, speech difficulty, light-headedness and headaches.   Psychiatric/Behavioral: Negative for confusion.   All other systems reviewed and are negative.      Past Medical History:   Diagnosis Date   • COPD, severe    • Dependence on supplemental oxygen    • Depression with anxiety    • Diastolic congestive heart failure    • Genital herpes    • GERD (gastroesophageal reflux disease)    • Hiatal hernia    • History of alcoholism    • History of cervical cancer    • History of ileus 05/05/2013   • HLD (hyperlipidemia)    • Hypertension    • Hypothyroidism    • Osteoarthritis    • Seasonal allergies        No Known Allergies    Past Surgical History:   Procedure Laterality Date   • APPENDECTOMY  2013   • CATARACT EXTRACTION Bilateral 2017   • DILATATION AND CURETTAGE     • TONSILLECTOMY     • TOTAL HIP ARTHROPLASTY Right        Family History   Problem Relation Age of Onset   • Heart attack Father    • Stroke Mother        Social History     Social History   • Marital status:      Spouse name: Michelet   • Number of children: 3   • Years of education: H.S.     Occupational History   •  Disabled     Social History Main Topics   • Smoking status: Former Smoker     Packs/day: 2.00     Years: 30.00     Types: Cigarettes     Start date: 1972     Quit date: 2002   • Smokeless tobacco: Never Used   • Alcohol use 8.4 oz/week     14 Cans of beer per week   • Drug use: No   • Sexual activity: Not  Currently     Partners: Male     Other Topics Concern   • None     Social History Narrative         Objective   Physical Exam   Constitutional: She is oriented to person, place, and time. She appears well-developed. No distress.   HENT:   Head: Normocephalic and atraumatic.   Right Ear: External ear normal.   Left Ear: External ear normal.   Nose: Nose normal.   Mouth/Throat: Oropharynx is clear and moist.   Eyes: EOM are normal. Pupils are equal, round, and reactive to light.   Neck: Normal range of motion. Neck supple. No JVD present.   Cardiovascular: Normal rate, regular rhythm and normal heart sounds.  Exam reveals no gallop and no friction rub.    Pulmonary/Chest: Effort normal. She has wheezes. She has no rales. She exhibits no tenderness.   Mild wheezing bilaterally.   Abdominal: Soft. Bowel sounds are normal. She exhibits no distension and no mass. There is no tenderness. There is no rebound and no guarding.   No signs of acute abdomen.  No pain at McBurney's point.  No pulsatile abdominal mass   Musculoskeletal: Normal range of motion. She exhibits no edema.   No pretibial edema.   Lymphadenopathy:     She has no cervical adenopathy.   Neurological: She is alert and oriented to person, place, and time.   5/5 strength bilaterally with flexion and extension of fingers, wrist, elbows, knees and hips as well as plantar and dorsiflexion of the foot.   Skin: Skin is warm and dry. No erythema. No pallor.   Psychiatric: She has a normal mood and affect. Her behavior is normal. Judgment and thought content normal.   Nursing note and vitals reviewed.      Procedures         ED Course  ED Course   Comment By Time   I had a nice conversation with the patient.  I ordered a CT scan of the head.  Her troponin is negative.  Her CBC and CMP are essentially unremarkable.  BNP is mildly elevated at 311, which is not unexpected in the setting of known CHF.  EKG shows a normal sinus rhythm with nonspecific changes.  Rate of  69.  CT scan of the head, chest x-ray and lipase all pending.  I am somewhat concerned about unexplained syncope and a 72-year-old female with CHF.  She does not sound like she has a good cardiology follow-up plan.  I plan to discuss the case personally with her primary care physician but do anticipate high likelihood for admission and have expressed this to the patient.  Patient is currently resting comfortably.  She has no active chest pain at this time. Allen Barbosa MD 01/04 0726   Dr. Barbosa paged Dr. Mcginnis, the patient's primary care physician. Haroldo Jefferson 01/04 0946   Dr. Barbosa discussed the case with Dr. Palacios, partner to Dr. Mcginnis. He agrees with plan for admission. Haroldo Jefferson 01/04 0948   Dr. Barbosa spoke with [facility]. Haroldo Jefferson 01/04 1008   Dr. Barbosa at bedside with the patient. All findings are discussed. Haroldo Jefferson 01/04 1022   I discussed the case personally with cardiology, Dr. Jose Alfredo Gilliland.  He agreed with the plan to admit to telemetry under the care of the hospitalist and stated that he would be happy to follow the patient in consultation.  I will page the hospitalist accordingly for admission. Allen Barbosa MD 01/04 1101   I discussed the case personally with Dr. Gato Hunter.  He will be admitting the patient.  I had a good conversation with Dr. Jose Alfredo Gilliland M.D. as well as previously noted.  The patient is aware of the plan for admission.  She will be admitted with an impression that includes elevated troponin, syncope, COPD.  Patient will be admitted to telemetry under the care of the hospitalist and with Cardiologic consultation. Allen Barbosa MD 01/04 1112     Recent Results (from the past 24 hour(s))   Comprehensive Metabolic Panel    Collection Time: 01/04/18  6:43 AM   Result Value Ref Range    Glucose 90 70 - 100 mg/dL    BUN 20 9 - 23 mg/dL    Creatinine 0.80 0.60 - 1.30 mg/dL    Sodium  135 132 - 146 mmol/L    Potassium 4.2 3.5 - 5.5 mmol/L    Chloride 94 (L) 99 - 109 mmol/L    CO2 36.0 (H) 20.0 - 31.0 mmol/L    Calcium 9.3 8.7 - 10.4 mg/dL    Total Protein 6.7 5.7 - 8.2 g/dL    Albumin 4.10 3.20 - 4.80 g/dL    ALT (SGPT) 42 (H) 7 - 40 U/L    AST (SGOT) 26 0 - 33 U/L    Alkaline Phosphatase 74 25 - 100 U/L    Total Bilirubin 0.5 0.3 - 1.2 mg/dL    eGFR Non African Amer 71 >60 mL/min/1.73    Globulin 2.6 gm/dL    A/G Ratio 1.6 1.5 - 2.5 g/dL    BUN/Creatinine Ratio 25.0 7.0 - 25.0    Anion Gap 5.0 3.0 - 11.0 mmol/L   Lipase    Collection Time: 01/04/18  6:43 AM   Result Value Ref Range    Lipase 53 (H) 6 - 51 U/L   BNP    Collection Time: 01/04/18  6:43 AM   Result Value Ref Range    .0 (H) 0.0 - 100.0 pg/mL   Light Blue Top    Collection Time: 01/04/18  6:43 AM   Result Value Ref Range    Extra Tube hold for add-on    Green Top (Gel)    Collection Time: 01/04/18  6:43 AM   Result Value Ref Range    Extra Tube Hold for add-ons.    Lavender Top    Collection Time: 01/04/18  6:43 AM   Result Value Ref Range    Extra Tube hold for add-on    Gold Top - SST    Collection Time: 01/04/18  6:43 AM   Result Value Ref Range    Extra Tube Hold for add-ons.    CBC Auto Differential    Collection Time: 01/04/18  6:43 AM   Result Value Ref Range    WBC 9.48 3.50 - 10.80 10*3/mm3    RBC 3.77 (L) 3.89 - 5.14 10*6/mm3    Hemoglobin 11.8 11.5 - 15.5 g/dL    Hematocrit 37.2 34.5 - 44.0 %    MCV 98.7 80.0 - 99.0 fL    MCH 31.3 (H) 27.0 - 31.0 pg    MCHC 31.7 (L) 32.0 - 36.0 g/dL    RDW 14.0 11.3 - 14.5 %    RDW-SD 50.5 37.0 - 54.0 fl    MPV 8.4 6.0 - 12.0 fL    Platelets 269 150 - 450 10*3/mm3    Neutrophil % 71.7 (H) 41.0 - 71.0 %    Lymphocyte % 20.6 (L) 24.0 - 44.0 %    Monocyte % 5.1 0.0 - 12.0 %    Eosinophil % 2.0 0.0 - 3.0 %    Basophil % 0.2 0.0 - 1.0 %    Immature Grans % 0.4 0.0 - 0.6 %    Neutrophils, Absolute 6.80 1.50 - 8.30 10*3/mm3    Lymphocytes, Absolute 1.95 0.60 - 4.80 10*3/mm3    Monocytes,  Absolute 0.48 0.00 - 1.00 10*3/mm3    Eosinophils, Absolute 0.19 0.00 - 0.30 10*3/mm3    Basophils, Absolute 0.02 0.00 - 0.20 10*3/mm3    Immature Grans, Absolute 0.04 (H) 0.00 - 0.03 10*3/mm3   POC Troponin, Rapid    Collection Time: 01/04/18  6:49 AM   Result Value Ref Range    Troponin I 0.07 0.00 - 0.07 ng/mL   POC Troponin, Rapid    Collection Time: 01/04/18  9:06 AM   Result Value Ref Range    Troponin I 0.15 (H) 0.00 - 0.07 ng/mL   Urinalysis With / Culture If Indicated - Urine, Clean Catch    Collection Time: 01/04/18  1:56 PM   Result Value Ref Range    Color, UA Yellow Yellow, Straw    Appearance, UA Clear Clear    pH, UA 8.0 5.0 - 8.0    Specific Gravity, UA 1.008 1.001 - 1.030    Glucose, UA Negative Negative    Ketones, UA Negative Negative    Bilirubin, UA Negative Negative    Blood, UA Negative Negative    Protein, UA Negative Negative    Leuk Esterase, UA Negative Negative    Nitrite, UA Negative Negative    Urobilinogen, UA 0.2 E.U./dL 0.2 - 1.0 E.U./dL   Influenza A & B, RT PCR - Swab, Nasopharynx    Collection Time: 01/04/18  2:01 PM   Result Value Ref Range    Influenza A PCR Not Detected Not Detected    Influenza B PCR Not Detected Not Detected   Lactic Acid, Plasma    Collection Time: 01/04/18  2:40 PM   Result Value Ref Range    Lactate 1.2 0.5 - 2.0 mmol/L   Troponin    Collection Time: 01/04/18  2:40 PM   Result Value Ref Range    Troponin I 0.200 (H) <=0.039 ng/mL   Procalcitonin    Collection Time: 01/04/18  2:40 PM   Result Value Ref Range    Procalcitonin <0.05 ng/mL     Note: In addition to lab results from this visit, the labs listed above may include labs taken at another facility or during a different encounter within the last 24 hours. Please correlate lab times with ED admission and discharge times for further clarification of the services performed during this visit.    CT Head Without Contrast   Final Result   1. No acute intracranial abnormality.   2. Moderate chronic small  vessel ischemic changes and changes of atrophy   of the supratentorial white matter.   3. 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       D:  01/04/2018   E:  01/04/2018       This report was finalized on 1/4/2018 10:06 AM by Dr. Quirino Garcia.          XR Chest 1 View   Final Result   1. Mild patchy airspace opacities (atelectasis and/or consolidation), decreased    from prior study.   2. Mild pulmonary vascular congestion, similar to prior study.      THIS DOCUMENT HAS BEEN ELECTRONICALLY SIGNED BY KEDAR BREWER MD      XR Chest PA & Lateral    (Results Pending)     Vitals:    01/04/18 1253 01/04/18 1255 01/04/18 1505 01/04/18 1612   BP: (!) 188/103 (!) 212/165 (!) 189/120 146/81   BP Location:  Left arm     Patient Position:  Lying     Pulse: 82  103 102   Resp: 24      Temp:       TempSrc:       SpO2: 93%      Weight:       Height:         Medications   sodium chloride 0.9 % flush 10 mL (not administered)   LORazepam (ATIVAN) tablet 1 mg (1 mg Oral Given 1/4/18 1223)   sodium chloride 0.9 % flush 1-10 mL (not administered)   enoxaparin (LOVENOX) syringe 40 mg (40 mg Subcutaneous Given 1/4/18 1505)   acetaminophen (TYLENOL) tablet 650 mg (650 mg Oral Given 1/4/18 1505)   ondansetron (ZOFRAN) tablet 4 mg (not administered)     Or   ondansetron (ZOFRAN) injection 4 mg (not administered)   losartan (COZAAR) tablet 100 mg (100 mg Oral Not Given 1/4/18 1517)   metoprolol tartrate (LOPRESSOR) tablet 25 mg (not administered)   isosorbide mononitrate (IMDUR) 24 hr tablet 60 mg (60 mg Oral Given 1/4/18 1505)   aluminum-magnesium hydroxide-simethicone (MAALOX/MYLANTA) suspension 30 mL (not administered)   aspirin EC tablet 81 mg (81 mg Oral Not Given 1/4/18 1540)   busPIRone (BUSPAR) tablet 10 mg (10 mg Oral Given 1/4/18 1539)   budesonide-formoterol (SYMBICORT) 80-4.5 MCG/ACT inhaler 2 puff (not administered)   gabapentin (NEURONTIN)  capsule 800 mg (800 mg Oral Given 1/4/18 1539)   HYDROcodone-acetaminophen (NORCO) 5-325 MG per tablet 1 tablet (not administered)   ibuprofen (ADVIL,MOTRIN) tablet 200 mg (200 mg Oral Given 1/4/18 1653)   levothyroxine (SYNTHROID, LEVOTHROID) tablet 50 mcg (50 mcg Oral Given 1/4/18 1539)   cetirizine (zyrTEC) tablet 10 mg (10 mg Oral Given 1/4/18 1539)   LORazepam (ATIVAN) tablet 1 mg (not administered)   losartan (COZAAR) tablet 50 mg (not administered)   montelukast (SINGULAIR) tablet 10 mg (not administered)   pantoprazole (PROTONIX) EC tablet 40 mg (40 mg Oral Given 1/4/18 1539)   atorvastatin (LIPITOR) tablet 10 mg (10 mg Oral Given 1/4/18 1539)   lactobacillus acidophilus (RISAQUAD) capsule 1 capsule (1 capsule Oral Given 1/4/18 1539)   ipratropium-albuterol (DUO-NEB) nebulizer solution 3 mL (3 mL Nebulization Given 1/4/18 1633)   albuterol (PROVENTIL) nebulizer solution 0.5% 2.5 mg/0.5mL (not administered)   predniSONE (DELTASONE) tablet 30 mg (not administered)   enalaprilat (VASOTEC) injection 0.625 mg (not administered)   sodium chloride 0.9 % infusion (not administered)   oseltamivir (TAMIFLU) capsule 75 mg (not administered)   doxycycline (VIBRAMYCIN) 100 mg in sodium chloride 0.9 % 250 mL IVPB (not administered)   aspirin chewable tablet 324 mg (324 mg Oral Given 1/4/18 1539)   labetalol (NORMODYNE,TRANDATE) injection 10 mg (10 mg Intravenous Given 1/4/18 1057)     ECG/EMG Results (last 24 hours)     ** No results found for the last 24 hours. **                        [facility]    Final diagnoses:   Elevated troponin   Syncope, unspecified syncope type   Chronic obstructive pulmonary disease, unspecified COPD type       Documentation assistance provided by ashley Jefferson.  Information recorded by the ashley was done at my direction and has been verified and validated by me.     Haroldo Jefferson  01/04/18 6816       [facility]  01/04/18 0731       [facility]  01/04/18 1112        Allen Barbosa MD  01/04/18 5454

## 2018-01-04 NOTE — PLAN OF CARE
Problem: Anxiety (Adult)  Goal: Identify Related Risk Factors and Signs and Symptoms  Outcome: Ongoing (interventions implemented as appropriate)    Goal: Reduction/Resolution  Outcome: Ongoing (interventions implemented as appropriate)      Problem: Fall Risk (Adult)  Goal: Identify Related Risk Factors and Signs and Symptoms  Outcome: Ongoing (interventions implemented as appropriate)    Goal: Absence of Falls  Outcome: Ongoing (interventions implemented as appropriate)      Problem: Respiratory Insufficiency (Adult)  Goal: Identify Related Risk Factors and Signs and Symptoms  Outcome: Ongoing (interventions implemented as appropriate)    Goal: Acid/Base Balance  Outcome: Ongoing (interventions implemented as appropriate)    Goal: Effective Ventilation  Outcome: Ongoing (interventions implemented as appropriate)

## 2018-01-05 ENCOUNTER — APPOINTMENT (OUTPATIENT)
Dept: GENERAL RADIOLOGY | Facility: HOSPITAL | Age: 73
End: 2018-01-05

## 2018-01-05 PROBLEM — J96.20 RESPIRATORY FAILURE, ACUTE-ON-CHRONIC (HCC): Status: ACTIVE | Noted: 2017-12-20

## 2018-01-05 LAB
ALBUMIN SERPL-MCNC: 3.5 G/DL (ref 3.2–4.8)
ALBUMIN/GLOB SERPL: 1.6 G/DL (ref 1.5–2.5)
ALP SERPL-CCNC: 73 U/L (ref 25–100)
ALT SERPL W P-5'-P-CCNC: 32 U/L (ref 7–40)
ANION GAP SERPL CALCULATED.3IONS-SCNC: 9 MMOL/L (ref 3–11)
ARTERIAL PATENCY WRIST A: ABNORMAL
ARTERIAL PATENCY WRIST A: POSITIVE
AST SERPL-CCNC: 31 U/L (ref 0–33)
ATMOSPHERIC PRESS: ABNORMAL MMHG
ATMOSPHERIC PRESS: ABNORMAL MMHG
BASE EXCESS BLDA CALC-SCNC: 7 MMOL/L (ref 0–2)
BASE EXCESS BLDA CALC-SCNC: 8.2 MMOL/L (ref 0–2)
BASOPHILS # BLD MANUAL: 0 10*3/MM3 (ref 0–0.2)
BASOPHILS NFR BLD AUTO: 0 % (ref 0–1)
BDY SITE: ABNORMAL
BDY SITE: ABNORMAL
BILIRUB SERPL-MCNC: 1 MG/DL (ref 0.3–1.2)
BNP SERPL-MCNC: 261 PG/ML (ref 0–100)
BNP SERPL-MCNC: 370 PG/ML (ref 0–100)
BUN BLD-MCNC: 20 MG/DL (ref 9–23)
BUN/CREAT SERPL: 25 (ref 7–25)
CALCIUM SPEC-SCNC: 8.2 MG/DL (ref 8.7–10.4)
CHLORIDE SERPL-SCNC: 93 MMOL/L (ref 99–109)
CO2 BLDA-SCNC: 33.7 MMOL/L (ref 22–33)
CO2 BLDA-SCNC: 33.8 MMOL/L (ref 22–33)
CO2 SERPL-SCNC: 31 MMOL/L (ref 20–31)
COHGB MFR BLD: 2 % (ref 0–2)
COHGB MFR BLD: 2.1 % (ref 0–2)
CREAT BLD-MCNC: 0.8 MG/DL (ref 0.6–1.3)
D-LACTATE SERPL-SCNC: 1.7 MMOL/L (ref 0.5–2)
DEPRECATED RDW RBC AUTO: 50.5 FL (ref 37–54)
EOSINOPHIL # BLD MANUAL: 0 10*3/MM3 (ref 0.1–0.3)
EOSINOPHIL NFR BLD MANUAL: 0 % (ref 0–3)
ERYTHROCYTE [DISTWIDTH] IN BLOOD BY AUTOMATED COUNT: 14.2 % (ref 11.3–14.5)
GFR SERPL CREATININE-BSD FRML MDRD: 71 ML/MIN/1.73
GLOBULIN UR ELPH-MCNC: 2.2 GM/DL
GLUCOSE BLD-MCNC: 107 MG/DL (ref 70–100)
GLUCOSE BLDC GLUCOMTR-MCNC: 107 MG/DL (ref 70–130)
GLUCOSE BLDC GLUCOMTR-MCNC: 108 MG/DL (ref 70–130)
GLUCOSE BLDC GLUCOMTR-MCNC: 137 MG/DL (ref 70–130)
HCO3 BLDA-SCNC: 32.3 MMOL/L (ref 20–26)
HCO3 BLDA-SCNC: 32.4 MMOL/L (ref 20–26)
HCT VFR BLD AUTO: 34 % (ref 34.5–44)
HCT VFR BLD CALC: 33.3 %
HCT VFR BLD CALC: 33.4 %
HGB BLD-MCNC: 11 G/DL (ref 11.5–15.5)
HGB BLDA-MCNC: 10.9 G/DL (ref 14–18)
HGB BLDA-MCNC: 10.9 G/DL (ref 14–18)
HOROWITZ INDEX BLD+IHG-RTO: 45 %
HOROWITZ INDEX BLD+IHG-RTO: 50 %
LYMPHOCYTES # BLD MANUAL: 0.73 10*3/MM3 (ref 0.6–4.8)
LYMPHOCYTES NFR BLD MANUAL: 6 % (ref 0–12)
LYMPHOCYTES NFR BLD MANUAL: 6 % (ref 24–44)
MAGNESIUM SERPL-MCNC: 2.7 MG/DL (ref 1.3–2.7)
MCH RBC QN AUTO: 31.7 PG (ref 27–31)
MCHC RBC AUTO-ENTMCNC: 32.4 G/DL (ref 32–36)
MCV RBC AUTO: 98 FL (ref 80–99)
METHGB BLD QL: 1 % (ref 0–1.5)
METHGB BLD QL: 1.2 % (ref 0–1.5)
MODALITY: ABNORMAL
MODALITY: ABNORMAL
MONOCYTES # BLD AUTO: 0.73 10*3/MM3 (ref 0–1)
NEUTROPHILS # BLD AUTO: 10.77 10*3/MM3 (ref 1.5–8.3)
NEUTROPHILS NFR BLD MANUAL: 75 % (ref 41–71)
NEUTS BAND NFR BLD MANUAL: 13 % (ref 0–5)
OXYHGB MFR BLDV: 94.4 % (ref 94–99)
OXYHGB MFR BLDV: 94.6 % (ref 94–99)
PCO2 BLDA: 42.6 MM HG (ref 35–45)
PCO2 BLDA: 48.2 MM HG (ref 35–45)
PH BLDA: 7.43 PH UNITS (ref 7.35–7.45)
PH BLDA: 7.49 PH UNITS (ref 7.35–7.45)
PHOSPHATE SERPL-MCNC: 5.1 MG/DL (ref 2.4–5.1)
PLAT MORPH BLD: NORMAL
PLATELET # BLD AUTO: 175 10*3/MM3 (ref 150–450)
PMV BLD AUTO: 9.3 FL (ref 6–12)
PO2 BLDA: 89.8 MM HG (ref 83–108)
PO2 BLDA: 90.3 MM HG (ref 83–108)
POTASSIUM BLD-SCNC: 4.5 MMOL/L (ref 3.5–5.5)
PROT SERPL-MCNC: 5.7 G/DL (ref 5.7–8.2)
RBC # BLD AUTO: 3.47 10*6/MM3 (ref 3.89–5.14)
RBC MORPH BLD: NORMAL
SODIUM BLD-SCNC: 133 MMOL/L (ref 132–146)
TROPONIN I SERPL-MCNC: 4.64 NG/ML
WBC MORPH BLD: NORMAL
WBC NRBC COR # BLD: 12.24 10*3/MM3 (ref 3.5–10.8)

## 2018-01-05 PROCEDURE — 85025 COMPLETE CBC W/AUTO DIFF WBC: CPT | Performed by: INTERNAL MEDICINE

## 2018-01-05 PROCEDURE — 25010000002 ENOXAPARIN PER 10 MG: Performed by: NURSE PRACTITIONER

## 2018-01-05 PROCEDURE — 99232 SBSQ HOSP IP/OBS MODERATE 35: CPT | Performed by: INTERNAL MEDICINE

## 2018-01-05 PROCEDURE — 82962 GLUCOSE BLOOD TEST: CPT

## 2018-01-05 PROCEDURE — 94640 AIRWAY INHALATION TREATMENT: CPT

## 2018-01-05 PROCEDURE — 87581 M.PNEUMON DNA AMP PROBE: CPT | Performed by: INTERNAL MEDICINE

## 2018-01-05 PROCEDURE — 87798 DETECT AGENT NOS DNA AMP: CPT | Performed by: INTERNAL MEDICINE

## 2018-01-05 PROCEDURE — 84100 ASSAY OF PHOSPHORUS: CPT | Performed by: INTERNAL MEDICINE

## 2018-01-05 PROCEDURE — 94799 UNLISTED PULMONARY SVC/PX: CPT

## 2018-01-05 PROCEDURE — 85007 BL SMEAR W/DIFF WBC COUNT: CPT | Performed by: INTERNAL MEDICINE

## 2018-01-05 PROCEDURE — 80053 COMPREHEN METABOLIC PANEL: CPT | Performed by: INTERNAL MEDICINE

## 2018-01-05 PROCEDURE — 25010000002 HYDROCORTISONE SODIUM SUCCINATE 100 MG RECONSTITUTED SOLUTION: Performed by: INTERNAL MEDICINE

## 2018-01-05 PROCEDURE — 82805 BLOOD GASES W/O2 SATURATION: CPT | Performed by: INTERNAL MEDICINE

## 2018-01-05 PROCEDURE — 83605 ASSAY OF LACTIC ACID: CPT | Performed by: INTERNAL MEDICINE

## 2018-01-05 PROCEDURE — 36600 WITHDRAWAL OF ARTERIAL BLOOD: CPT | Performed by: INTERNAL MEDICINE

## 2018-01-05 PROCEDURE — 83880 ASSAY OF NATRIURETIC PEPTIDE: CPT | Performed by: INTERNAL MEDICINE

## 2018-01-05 PROCEDURE — 87633 RESP VIRUS 12-25 TARGETS: CPT | Performed by: INTERNAL MEDICINE

## 2018-01-05 PROCEDURE — 84484 ASSAY OF TROPONIN QUANT: CPT | Performed by: INTERNAL MEDICINE

## 2018-01-05 PROCEDURE — 83735 ASSAY OF MAGNESIUM: CPT | Performed by: NURSE PRACTITIONER

## 2018-01-05 PROCEDURE — 71045 X-RAY EXAM CHEST 1 VIEW: CPT

## 2018-01-05 PROCEDURE — 99233 SBSQ HOSP IP/OBS HIGH 50: CPT | Performed by: INTERNAL MEDICINE

## 2018-01-05 PROCEDURE — 87486 CHLMYD PNEUM DNA AMP PROBE: CPT | Performed by: INTERNAL MEDICINE

## 2018-01-05 RX ORDER — MAGNESIUM SULFATE HEPTAHYDRATE 40 MG/ML
4 INJECTION, SOLUTION INTRAVENOUS AS NEEDED
Status: DISCONTINUED | OUTPATIENT
Start: 2018-01-05 | End: 2018-01-11 | Stop reason: HOSPADM

## 2018-01-05 RX ORDER — AZITHROMYCIN 250 MG/1
500 TABLET, FILM COATED ORAL
Status: COMPLETED | OUTPATIENT
Start: 2018-01-05 | End: 2018-01-05

## 2018-01-05 RX ORDER — AZITHROMYCIN 250 MG/1
250 TABLET, FILM COATED ORAL EVERY 24 HOURS
Status: COMPLETED | OUTPATIENT
Start: 2018-01-06 | End: 2018-01-09

## 2018-01-05 RX ADMIN — ATORVASTATIN CALCIUM 10 MG: 10 TABLET, FILM COATED ORAL at 07:50

## 2018-01-05 RX ADMIN — PANTOPRAZOLE SODIUM 40 MG: 40 TABLET, DELAYED RELEASE ORAL at 07:49

## 2018-01-05 RX ADMIN — HYDROCORTISONE SODIUM SUCCINATE 50 MG: 100 INJECTION, POWDER, FOR SOLUTION INTRAMUSCULAR; INTRAVENOUS at 05:57

## 2018-01-05 RX ADMIN — HYDROCORTISONE SODIUM SUCCINATE 50 MG: 100 INJECTION, POWDER, FOR SOLUTION INTRAMUSCULAR; INTRAVENOUS at 12:12

## 2018-01-05 RX ADMIN — BUSPIRONE HYDROCHLORIDE 10 MG: 10 TABLET ORAL at 07:49

## 2018-01-05 RX ADMIN — HYDROCORTISONE SODIUM SUCCINATE 50 MG: 100 INJECTION, POWDER, FOR SOLUTION INTRAMUSCULAR; INTRAVENOUS at 17:24

## 2018-01-05 RX ADMIN — LORAZEPAM 1 MG: 1 TABLET ORAL at 22:17

## 2018-01-05 RX ADMIN — IPRATROPIUM BROMIDE AND ALBUTEROL SULFATE 3 ML: .5; 3 SOLUTION RESPIRATORY (INHALATION) at 15:58

## 2018-01-05 RX ADMIN — IPRATROPIUM BROMIDE AND ALBUTEROL SULFATE 3 ML: .5; 3 SOLUTION RESPIRATORY (INHALATION) at 19:56

## 2018-01-05 RX ADMIN — ARFORMOTEROL TARTRATE 15 MCG: 15 SOLUTION RESPIRATORY (INHALATION) at 07:33

## 2018-01-05 RX ADMIN — LEVOTHYROXINE SODIUM 50 MCG: 50 TABLET ORAL at 07:50

## 2018-01-05 RX ADMIN — ENOXAPARIN SODIUM 40 MG: 40 INJECTION SUBCUTANEOUS at 16:23

## 2018-01-05 RX ADMIN — HYDROCORTISONE SODIUM SUCCINATE 50 MG: 100 INJECTION, POWDER, FOR SOLUTION INTRAMUSCULAR; INTRAVENOUS at 00:28

## 2018-01-05 RX ADMIN — ASPIRIN 81 MG: 81 TABLET ORAL at 07:49

## 2018-01-05 RX ADMIN — BUSPIRONE HYDROCHLORIDE 10 MG: 10 TABLET ORAL at 16:23

## 2018-01-05 RX ADMIN — BUSPIRONE HYDROCHLORIDE 10 MG: 10 TABLET ORAL at 20:59

## 2018-01-05 RX ADMIN — IPRATROPIUM BROMIDE AND ALBUTEROL SULFATE 3 ML: .5; 3 SOLUTION RESPIRATORY (INHALATION) at 12:53

## 2018-01-05 RX ADMIN — MONTELUKAST SODIUM 10 MG: 10 TABLET, FILM COATED ORAL at 20:59

## 2018-01-05 RX ADMIN — IPRATROPIUM BROMIDE AND ALBUTEROL SULFATE 3 ML: .5; 3 SOLUTION RESPIRATORY (INHALATION) at 07:32

## 2018-01-05 RX ADMIN — AZITHROMYCIN 500 MG: 250 TABLET, FILM COATED ORAL at 19:25

## 2018-01-05 RX ADMIN — BUDESONIDE 0.5 MG: 0.5 INHALANT RESPIRATORY (INHALATION) at 07:32

## 2018-01-05 NOTE — PROGRESS NOTES
"  [facility]   Inpatient Progress Note       LOS: 1 day   Patient Care Team:  Nick Mcginnis MD as PCP - General (Family Medicine)    Chief Complaint:  Follow-up for elevated troponin    Subjective     Interval History:   Sleepy, primary complaint is of lower abdominal discomfort      Review of Systems:   Pertinent positives noted in history, exam, and assessment. Otherwise reviewed and negative.      Objective     Vitals:  Blood pressure 123/61, pulse 77, temperature 99.5 °F (37.5 °C), temperature source Axillary, resp. rate 21, height 152.4 cm (60\"), weight 83.2 kg (183 lb 6 oz), SpO2 95 %.     Intake/Output Summary (Last 24 hours) at 01/05/18 1111  Last data filed at 01/05/18 1000   Gross per 24 hour   Intake          1493.75 ml   Output             1685 ml   Net          -191.25 ml     Physical Exam   Constitutional: She is oriented to person, place, and time. She appears well-developed and well-nourished.   HENT:   Mouth/Throat: Oropharynx is clear and moist.   Neck: No JVD present. Carotid bruit is not present. No thyromegaly present.   Cardiovascular: Regular rhythm, S1 normal, S2 normal, normal heart sounds and intact distal pulses.  Exam reveals no gallop, no S3 and no S4.    No murmur heard.  Pulses:       Carotid pulses are 2+ on the right side, and 2+ on the left side.       Radial pulses are 2+ on the right side, and 2+ on the left side.   Pulmonary/Chest: Breath sounds normal.   Abdominal: Soft. Bowel sounds are normal. She exhibits no mass. There is no tenderness.   Musculoskeletal: She exhibits no edema.   Neurological: She is alert and oriented to person, place, and time.   Skin: Skin is warm and dry. No rash noted.          Results Review:     I reviewed the patient's new clinical results.      Results from last 7 days  Lab Units 01/05/18  0430   WBC 10*3/mm3 12.24*   HEMOGLOBIN g/dL 11.0*   HEMATOCRIT % 34.0*   PLATELETS 10*3/mm3 175       Results from last 7 " days  Lab Units 01/05/18  0646   SODIUM mmol/L 133   POTASSIUM mmol/L 4.5   CHLORIDE mmol/L 93*   CO2 mmol/L 31.0   BUN mg/dL 20   CREATININE mg/dL 0.80   CALCIUM mg/dL 8.2*   BILIRUBIN mg/dL 1.0   ALK PHOS U/L 73   ALT (SGPT) U/L 32   AST (SGOT) U/L 31   GLUCOSE mg/dL 107*       Results from last 7 days  Lab Units 01/05/18  0646   SODIUM mmol/L 133   POTASSIUM mmol/L 4.5   CHLORIDE mmol/L 93*   CO2 mmol/L 31.0   BUN mg/dL 20   CREATININE mg/dL 0.80   GLUCOSE mg/dL 107*   CALCIUM mg/dL 8.2*           Lab Results  Lab Value Date/Time   TROPONINI 4.637 (C) 01/05/2018 0646   TROPONINI 0.428 (H) 01/04/2018 2028   TROPONINI 0.200 (H) 01/04/2018 1440                     Tele:      Assessment/Plan     Principal Problem:    Syncope and collapse  Active Problems:    Hypertension    Hypothyroidism    Dependence on supplemental oxygen    Secondary pulmonary arterial hypertension    COPD, severe    Depression with anxiety    Diastolic congestive heart failure    GERD (gastroesophageal reflux disease)    HLD (hyperlipidemia)    Elevated troponin    Febrile illness, acute      1. Positive troponin   - Normal left ventricular systolic function by echo last month.    - Suspect this is due to her recent illness and not true myocardial ischemia.   - medical therapy  2. Syncope,   - initially sounded vasovagal  - patient with hypotension last evening and decreased LOC, transferred to ICU.  3. Fever/leukocytosis  - no antibiotics, being evaluated per the intensivist service.   - Peak temp of 102.4 yesterday  4. Severe/end-stage COPD with chronic 4 L of oxygen at home  5. Hypertension   - started on home meds with noted hypotension last evening.  6. History of diastolic heart failure, currently appears euvolemic    Plan:  Positive troponin in the setting of vasovagal syncope, severe end-stage COPD, and new fever/leukocytosis.  Her hypotension last evening may be due to sepsis, but most likely due to her relative dehydration and  resuming her at-home medications.  At this time, she is not a candidate for, nor does she need invasive cardiac measures as I do not believe her elevated troponins represent ACS.  Her chief complaint to me appears to be lower abdominal pain.  We will see again on Monday, please call the on call physician if any questions over the weekend  IRVING Sosa  01/05/18  11:11 AM  I, Steven Newsome have reviewed the note in full and agree with all aspects of the above including physical exam, assessment, labs and plan with changes made accordingly.     Steven Newsome MD  01/05/18  2:46 PM        Dictated utilizing Dragon dictation

## 2018-01-05 NOTE — PROGRESS NOTES
Multidisciplinary Rounds    Time: 20min  Patient Name: Virgie Arroyo  Date of Encounter: 01/05/18 9:12 AM  MRN: [id number]  Admission date: 1/4/2018      Reason for visit: MDR. RD to continue to follow per protocol.     Additional information obtained during MDR: Rec starting PO diet If pt does not have procedure today and able to tolerate off bipap (? Use HFNC per RN).     Current diet: NPO Diet Ice chips      Intervention:  Follow treatment plan  Care plan reviewed    Follow up:   Per protocol      Brittany Moser MS RD/DONNA CN[facility]  9:12 AM

## 2018-01-05 NOTE — PROGRESS NOTES
INTENSIVIST   PROGRESS NOTE     Hospital:  LOS: 1 day      Subjective   Ms. Virgie Arroyo, 72 y.o. female is followed for:      Syncope and collapse    COPD, severe    Elevated troponin    Febrile illness, acute    As an Intensivist, we provide an integrated approach to the ICU patient and family, medical management of comorbid conditions, lead interdisciplinary rounds and coordinate the care with all other services, including those from other specialists.     S     Interval History:  Using BiPAP during the night.  Minimal improving.  Hypotension, after she received her BP meds, better after IV fluids. No drips at present.     The patient's relevant past medical, surgical and social history were reviewed and updated in Epic as appropriate.      ROS:   Constitutional: Negative for fever.   Respiratory: Negative for dyspnea.   Cardiovascular: Positive for chest pain.   Gastrointestinal: Negative for  nausea, vomiting and diarrhea.     Objective   O     Vitals:  Temp: 98.4 °F (36.9 °C) Temp  Min: 97.5 °F (36.4 °C)  Max: 99.5 °F (37.5 °C)   BP: 122/75 BP  Min: 59/40  Max: 129/71   Pulse: 79 Pulse  Min: 64  Max: 90   Resp: 20 Resp  Min: 15  Max: 21   SpO2: 93 % SpO2  Min: 79 %  Max: 100 %   Device: nasal cannula    Flow Rate: 6 Flow (L/min)  Min: 4.5  Max: 6     Intake/Ouptut 24 hrs (7:00AM - 6:59 AM)  Intake & Output (last 3 days)       01/02 0701 - 01/03 0700 01/03 0701 - 01/04 0700 01/04 0701 - 01/05 0700 01/05 0701 - 01/06 0700    I.V. (mL/kg)   493.8 (5.9)     IV Piggyback   1000     Total Intake(mL/kg)   1493.8 (18)     Urine (mL/kg/hr)   1525 (0.8) 435 (0.5)    Stool   0 (0) 0 (0)    Total Output     1525 435    Net     -31.3 -435            Unmeasured Urine Occurrence   5 x     Unmeasured Stool Occurrence   1 x 3 x        Physical Exam:    Sinus Rhythm: normal sinus rhythm     General Appearance:  No distress  Respiratory:    Wheezing to auscultation bilaterally, normal effort, no tenderness to  palpation  Cardiovascular:  Regular rate and rhythm, no murmurs, no peripheral edema, no thrill  Gastrointestinal:   Soft, non-tender, non-distended, no hepatosplenomegaly  Psychiatric:    Confused  Neuro:    No new focal neurologic deficits observed  LAD:   Peripheral IVs. [place].      Hematology:    Results from last 7 days  Lab Units 01/05/18  0430 01/04/18 2028 01/04/18  0643   WBC 10*3/mm3 12.24* 8.45 9.48   HEMOGLOBIN g/dL 11.0* 10.1* 11.8   HEMATOCRIT % 34.0* 31.4* 37.2   PLATELETS 10*3/mm3 175 228 269   MONOCYTES % % 6.0  --   --      Electrolytes, Magnesium and Phosphorus:    Results from last 7 days  Lab Units 01/05/18  0646 01/04/18 2028 01/04/18  0643   SODIUM mmol/L 133 131* 135   CHLORIDE mmol/L 93* 92* 94*   POTASSIUM mmol/L 4.5 4.7 4.2   CO2 mmol/L 31.0 32.0* 36.0*   MAGNESIUM mg/dL 2.7 1.9  --    PHOSPHORUS mg/dL 5.1  --   --      Renal:    Results from last 7 days  Lab Units 01/05/18  0646 01/04/18 2028 01/04/18  0643   CREATININE mg/dL 0.80 1.20 0.80   BUN mg/dL 20 20 20     Estimated Creatinine Clearance: 61.7 mL/min (by C-G formula based on Cr of 0.8).    Hepatic:    Results from last 7 days  Lab Units 01/05/18  0646 01/04/18 2028 01/04/18  0643   ALK PHOS U/L 73 50 74   BILIRUBIN mg/dL 1.0 0.9 0.5   ALT (SGPT) U/L 32 31 42*   AST (SGOT) U/L 31 20 26     Cardiac:    Results from last 7 days  Lab Units 01/05/18  0646 01/04/18 2028 01/04/18  1440   TROPONIN I ng/mL 4.637* 0.428* 0.200*     Lab Results   Component Value Date    .0 (H) 01/05/2018    .0 (H) 01/04/2018    .0 (H) 01/04/2018     No results found for: CORTISOL     Lab Results   Component Value Date    PROCALCITO <0.05 01/04/2018     Arterial Blood Gases:    Results from last 7 days  Lab Units 01/05/18  0946 01/05/18  0427 01/04/18 2120 01/04/18 2014   PH, ARTERIAL pH units 7.490* 7.435 7.431 7.483*   PCO2, ARTERIAL mm Hg 42.6 48.2* 46.6* 43.0   PO2 ART mm Hg 90.3 89.8 56.0* 73.5*   FIO2 % 45 50 40 40      Relevant imaging studies and labs from 01/05/18 were reviewed and interpreted by me    Results for orders placed during the hospital encounter of 12/20/17   Adult Transthoracic Echo Complete W/ Cont if Necessary Per Protocol    Narrative · Left ventricular systolic function is normal.  · Estimated EF appears to be in the range of 66 - 70%.  · Left ventricular diastolic dysfunction (grade I) consistent with   impaired relaxation.  · Left ventricular wall thickness is consistent with mild concentric   hypertrophy.  · Mild aortic valve regurgitation is present.  · Mild to moderate tricuspid valve regurgitation is present.  · Estimated right ventricular systolic pressure from tricuspid   regurgitation is moderately elevated (45-55 mmHg).          Assessment/Plan   A / P     72 y.o.female, admitted on 1/4/2018 with Elevated troponin [R74.8]  Elevated troponin [R74.8]:     1. Acute on chronic respiratory failure  1. COPD  1. Recent hospitalization at [facility] for COPD exacerbation, 12/20-12/27  2. ELIZABETH on CPAP  3. P HTN Group 3  2. Cardiac:  1. Syncope, probably related to hypotension  2. NSTEM  1. Elevated troponin  3. HFpEF  4. HTN  3. Fever on admission  1. Etiology: Unclear at present - viral vs bacterial?  4. Hypotension  1. Secondary to medications  2. On stress dose steroids, since 1/4/2017  3. BIGG, creatinine back to baseline.  5. Dyslipidemia  6. Hypothyroidism - TSH 0.845 (12/21/17)  7. Depression  8. GERD  9. Antibiotics: Zithromax  10. Glucose:    Results from last 7 days  Lab Units 01/05/18  1741 01/05/18  1207 01/05/18  0513 01/04/18  2330 01/04/18 2009   GLUCOSE mg/dL 137* 107 108 124 128       Lab Results  Lab Value Date/Time   HGBA1C 5.60 12/21/2017 0444        Nutrition:  Diet Regular; Consistent Carbohydrate, Cardiac   Advance Directives: Full Code       Assessment / Plan:    1. Continue Bipap prn  2. Monitor Gas exchange  3. Antibiotics    Plan of care and goals reviewed during interdisciplinary  rounds.  I discussed the patient's findings and my recommendations with patient and nursing staff      Mariano Vallejo MD, FACP, FCCP, CNSC  Intensive Care Medicine, Nutrition Support and Pulmonary Medicine

## 2018-01-05 NOTE — PROGRESS NOTES
Discharge Planning Assessment  [facility]     Patient Name: Virgie Arroyo  MRN: [id number]  Today's Date: 1/5/2018    Admit Date: 1/4/2018          Discharge Needs Assessment       01/05/18 1501    Living Environment    Lives With facility resident;spouse   [facility]    Living Arrangements house    Home Accessibility no concerns    Stair Railings at Home none    Type of Financial/Environmental Concern none    Transportation Available family or friend will provide    Living Environment    Provides Primary Care For no one    Quality Of Family Relationships supportive    Discharge Needs Assessment    Concerns To Be Addressed adjustment to diagnosis/illness concerns    Readmission Within The Last 30 Days current reason for admission unrelated to previous admission    Anticipated Changes Related to Illness inability to care for self    Equipment Currently Used at Home oxygen;walker, rolling    Equipment Needed After Discharge none            Discharge Plan       01/05/18 1502    Case Management/Social Work Plan    Plan Return to [facility]    Patient/Family In Agreement With Plan yes    Additional Comments Spoke with patient at bedside.  Patient resides in [facility] and lives with her .  Patient was hospitalized 12/22/17-12/27/17 and was then transferred to [facility], which is where she was when she came back to [facility].  Prior to previous admission patient states she was independent with ADL's and mobility.  Patient has a rolling walker and is on oxygen provided by [facility].  She is on continuous oxygen and does have portable.  Patient states she wants to return to [facility] when she is discharge.  Referral called to Shaunna with [facility].  CM will continue to follow.        Discharge Placement     No information found        Expected Discharge Date and Time     Expected Discharge Date Expected Discharge Time    Jan 9, 2018               Demographic Summary       01/05/18 1457     Referral Information    Admission Type inpatient    Arrived From another healthcare institution, not defined    Referral Source admission list    Reason For Consult discharge planning    Contact Information    Permission Granted to Share Information With ;family/designee    Primary Care Physician Information    Name Nick Mcginnis            Functional Status     None            Psychosocial     None            Abuse/Neglect     None            Legal     None            Substance Abuse     None            Patient Forms     None          Casandra Ruff RN

## 2018-01-05 NOTE — PROGRESS NOTES
INTENSIVIST / PULMONARY FOLLOW UP NOTE     Hospital:  LOS: 0 days   Ms. Virgie Arroyo, 72 y.o. female is followed for:   Principal Problem:    Syncope and collapse  Active Problems:    Hypertension    Hypothyroidism    Dependence on supplemental oxygen    Secondary pulmonary arterial hypertension    COPD, severe    Depression with anxiety    Diastolic congestive heart failure    GERD (gastroesophageal reflux disease)    HLD (hyperlipidemia)    Elevated troponin    Febrile illness, acute          SUBJECTIVE   73 y/o WF w/ h/o HTN, HLD, Diastolic HF, COPD on home O2, ELIZABETH on CPAP, recent admission to [facility] 12/20-12/27 for AECOPD, was transferred to [facility].  Returns today for syncopal event.  She awoke on floor surrounded by staff.  Was found to be hypertensive on admission, has also had chills, and fever of 101.6.  Initially admitted to floor.  I was called for sudden hypotension, worsening hypoxemia and lethargy.  She was found unresponsive in Bed.  Patient received multiple BP meds and some ativan (which are home meds but she is on multiple BP meds and multiple sedating meds at high doses.  She became hypotensive and then received about 1.5 liters of fluid.  She developed worsening hypoxemia and was transferred to the ICU.    The patient's relevant past medical, surgical, family, and social history were reviewed    Allergies and medications were reviewed    ROS:  Per subjective, all other systems were reviewed and were negative        OBJECTIVE     Vital Sign Min/Max for last 24 hours:  Temp  Min: 97.5 °F (36.4 °C)  Max: 102.4 °F (39.1 °C)   BP  Min: 59/40  Max: 220/119   Pulse  Min: 67  Max: 103   Resp  Min: 16  Max: 24   SpO2  Min: 79 %  Max: 99 %   Flow (L/min)  Min: 3  Max: 5     Physical Exam:  General Appearance:  Sleepy but rousable, in no acute distress  Eyes:  No scleral icterus or pallor, pupils normal  Ears, Nose, Mouth, Throat:  Atraumatic, oropharynx clear  Neck:  Trachea midline, thyroid  normal  Respiratory:  Wheezing to auscultation bilaterally, normal effort, no tenderness to palpation  Cardiovascular:  Regular rate and rhythm, no murmurs, no peripheral edema, no thrill  Gastrointestinal:  Soft, non-tender, non-distended, no hepatosplenomegaly  Skin:  Normal temperature, no rash  Psychiatric:  Confused  Neuro:  No new focal neurologic deficits observed    Telemetry:  Sinus Rhythm: normal sinus rhythm           Hemodynamics:   CVP:     PAP:     PAOP:     CO:     CI:     SVI:     SVR:       SpO2: 93 % SpO2  Min: 79 %  Max: 99 %   Device: BiPAP    Flow Rate: 5 Flow (L/min)  Min: 3  Max: 5     Mechanical Ventilator Settings:                                         Intake/Ouptut 24 hrs (7:00AM - 6:59 AM)  Intake & Output (last 3 days)       01/02 0701 - 01/03 0700 01/03 0701 - 01/04 0700 01/04 0701 - 01/05 0700           Unmeasured Urine Occurrence   5 x    Unmeasured Stool Occurrence   1 x          Lines, Drains & Airways    Active LDAs     Name:   Placement date:   Placement time:   Site:   Days:    Peripheral IV Line - Single Lumen 01/04/18 0700 median cubital vein (antecubital fossa), left 20 gauge  01/04/18    0700      less than 1    Peripheral IV Line - Single Lumen 01/04/18 2000 cephalic vein (lateral side of arm), left 20 gauge  01/04/18    2000      less than 1    Urethral Catheter 01/04/18 2000 16  01/04/18    2000      less than 1                Hematology:    Results from last 7 days  Lab Units 01/04/18 2028 01/04/18  0643   WBC 10*3/mm3 8.45 9.48   HEMOGLOBIN g/dL 10.1* 11.8   HEMATOCRIT % 31.4* 37.2   PLATELETS 10*3/mm3 228 269     Electrolytes, Magnesium and Phosphorus:    Results from last 7 days  Lab Units 01/04/18 2028 01/04/18  0643   SODIUM mmol/L 131* 135   CHLORIDE mmol/L 92* 94*   POTASSIUM mmol/L 4.7 4.2   CO2 mmol/L 32.0* 36.0*   MAGNESIUM mg/dL 1.9  --      Renal:    Results from last 7 days  Lab Units 01/04/18 2028 01/04/18  0643   CREATININE mg/dL 1.20 0.80   BUN mg/dL  20 20     Estimated Creatinine Clearance: 41.1 mL/min (by C-G formula based on Cr of 1.2).  Hepatic:    Results from last 7 days  Lab Units 01/04/18 2028 01/04/18  0643   ALK PHOS U/L 50 74   BILIRUBIN mg/dL 0.9 0.5   ALT (SGPT) U/L 31 42*   AST (SGOT) U/L 20 26     Arterial Blood Gases:    Results from last 7 days  Lab Units 01/04/18 2120 01/04/18 2014   PH, ARTERIAL pH units 7.431 7.483*   PCO2, ARTERIAL mm Hg 46.6* 43.0   PO2 ART mm Hg 56.0* 73.5*   FIO2 % 40 40             Lab Results   Component Value Date    LACTATE 1.3 01/04/2018       Relevant imaging studies and labs from 01/04/18 were reviewed and interpreted by me    Medications (drips):    sodium chloride Last Rate: 75 mL/hr (01/04/18 1630)         aspirin 81 mg Oral Daily   atorvastatin 10 mg Oral Daily   budesonide-formoterol 2 puff Inhalation BID - RT   busPIRone 10 mg Oral TID   cetirizine 10 mg Oral Daily   doxycycline 100 mg Intravenous Q12H   enoxaparin 40 mg Subcutaneous Q24H   furosemide 20 mg Intravenous Once   ipratropium-albuterol 3 mL Nebulization 4x Daily - RT   lactobacillus acidophilus 1 capsule Oral Daily   levothyroxine 50 mcg Oral Daily   montelukast 10 mg Oral Nightly   oseltamivir 75 mg Oral Q12H   pantoprazole 40 mg Oral Daily   [START ON 1/5/2018] predniSONE 30 mg Oral Daily With Breakfast       Assessment/Plan   IMPRESSION / PLAN     Inpatient Problem List:  72 y.o.female:  Hospital Problem List     * (Principal)Syncope and collapse    Hypertension    Hypothyroidism    Dependence on supplemental oxygen    Overview Signed 8/5/2016 10:21 AM by Jake Mtz     · JESSEE.  4 L at rest and up to 6 with exertion.            Secondary pulmonary arterial hypertension    Overview Signed 12/21/2017  4:58 PM by William Parr MD     Group 3 due to COPD         COPD, severe    Depression with anxiety    Diastolic congestive heart failure    GERD (gastroesophageal reflux disease)    HLD (hyperlipidemia)    Elevated troponin    Febrile  illness, acute           Impression:  72 y.o.female with relevant PMH of 71 y/o WF w/ h/o HTN, HLD, Diastolic HF, COPD on home O2, ELIZABETH on CPAP, Group 2+3 Pulmonary HTN recent admission to [facility] 12/20-12/27 for AECOPD,  admitted 1/4/2018 for Syncope.  Transferred to ICU for hypotension, hypoxemia, and altered mental status.    Plan:  -Hypotension / Fever - neg procalcitonin, negative flu PCR.  I performed bedside ultrasound which showed grossly normal LV/RV function, no pericardial effusion, normal filling pressures (IVC), and bilateral alveolar-interstitial pattern c/w pulmonary edema vs pneumonia.  I think most likely she has a viral respiratory illness, may be somewhat dehydrated, and received all her normal BP and sedative medications leading to hypotension.  Her troponins have slightly trended up (.07-->.15-->.20) but I am not suspicious of Acute Coronary Syndrome with no chest pain and no sig ECG changes.  Per report no events on tele were noted.  Will get CXR, Bipap, low dose of Lasix (now normotensive and hypoxemia more of an issue after fluids), d/c tamiflu - flu pcr neg, check resp viral PCR, C. Diff if diarrhea (recent abx).  Will also give stress dose steroids (recent prolonged steroid taper could have relative addrenal insufficiency).  Hold all BP meds and sedating meds.    COPD - brovana, pulmicort and duoneb, steroids as above.  Recent course of abx, d/c doxy.    BIGG - monitor (0.8--1.2), monica    Encephalopathy - I think related to all of the above, including benzos, high dose neurontin, etc.    Nutrition - NPO    SCDs/TEDs/Lovenox    Critical Care time spent in direct patient care: 45 minutes (excluding procedure time, if applicable) including high complexity decision making to assess, manipulate, and support vital organ system failure in this individual who has impairment of one or more vital organ systems such that there is a high probability of imminent or life threatening deterioration in the  patient’s condition.       Ezra Schaffer MD  Intensive Care Medicine  01/04/18 9:37 PM

## 2018-01-05 NOTE — PATIENT CARE CONFERENCE
ICU ROUNDS: PT/OT consults pending. Requiring bipap use this AM; RN requested to check on pt this PM. Needs heart cath.

## 2018-01-05 NOTE — NURSING NOTE
Patient Lying in bed. Blood pressure still decreased. Oxygen Sat in 70's. Non Rebreather placed on patient. Oxygen saturation still decreased. Respiratory called and in room. Cpap applied. Charge nurse in room.

## 2018-01-05 NOTE — PLAN OF CARE
Problem: Patient Care Overview (Adult)  Goal: Plan of Care Review  Outcome: Ongoing (interventions implemented as appropriate)   01/05/18 1556   Coping/Psychosocial Response Interventions   Plan Of Care Reviewed With patient   Patient Care Overview   Progress improving   Outcome Evaluation   Outcome Summary/Follow up Plan decreased fi02 on BIPAP throughout the day, now on 40%, dinner tray ordered and will attempt NC as pt states she is very hungry, BP stable-- no issues with hypotension today, never has needed insulin coverage.        Problem: Fall Risk (Adult)  Goal: Identify Related Risk Factors and Signs and Symptoms  Outcome: Ongoing (interventions implemented as appropriate)   01/05/18 1556   Fall Risk   Fall Risk: Related Risk Factors age-related changes;homeostatic imbalance;environment unfamiliar   Fall Risk: Signs and Symptoms presence of risk factors     Goal: Absence of Falls  Outcome: Ongoing (interventions implemented as appropriate)   01/05/18 1556   Fall Risk (Adult)   Absence of Falls making progress toward outcome       Problem: Respiratory Insufficiency (Adult)  Goal: Identify Related Risk Factors and Signs and Symptoms  Outcome: Ongoing (interventions implemented as appropriate)   01/05/18 1556   Respiratory Insufficiency   Related Risk Factors (Respiratory Insufficiency) activity intolerance;bedrest   Signs and Symptoms (Respiratory Insufficiency) abnormal breath sounds;decreased oxygen saturation;cough (productive/ineffective);respiratory rate alterations     Goal: Acid/Base Balance  Outcome: Ongoing (interventions implemented as appropriate)   01/05/18 1556   Respiratory Insufficiency (Adult)   Acid/Base Balance making progress toward outcome     Goal: Effective Ventilation  Outcome: Ongoing (interventions implemented as appropriate)   01/05/18 1556   Respiratory Insufficiency (Adult)   Effective Ventilation making progress toward outcome       Problem: Pressure Ulcer Risk (Yoseph Scale)  (Adult,Obstetrics,Pediatric)  Goal: Identify Related Risk Factors and Signs and Symptoms  Outcome: Ongoing (interventions implemented as appropriate)   01/05/18 1556   Pressure Ulcer Risk (Yoseph Scale)   Related Risk Factors (Pressure Ulcer Risk (Yoseph Scale)) age extremes;critical care admission;hospitalization prolonged;excretions/secretions;medical devices;mobility impaired     Goal: Skin Integrity  Outcome: Ongoing (interventions implemented as appropriate)   01/05/18 1556   Pressure Ulcer Risk (Yoseph Scale) (Adult,Obstetrics,Pediatric)   Skin Integrity making progress toward outcome

## 2018-01-05 NOTE — PROGRESS NOTES
Addendum:  Called at approx 1930 for patient with hypotension.  Had been hypertensive earlier; was given usual home meds inlcuding losartan and ativan, and BP fell over a span of 30 mins or so to systolics in the 70's.    Failed to respond to 250ml saline bolus.  Sats fell to 60s-70s.  Patient placed on bipap.     S:  Pt on bipap, supine, very somnolent but rousable.  Denies pain or dyspnea.    O:  Supine on bipap.  HR 80, sinus.  BP 72/50.  Repeat 79 systolic with saline wide open.    NAD, minimally rousable.  Nods/shakes head.  Heart RRR, distant, no murmur or heave  Lungs CTA anteriorly, nonlabored on bipap  abd soft and nontender, benign  extr feet cool  Neuro:  Follows simple commands with urging.  Nonfocal.  CN grossly intact    EKG:  NSR, no st/t wave changes, no change from 0900    A:  Acute fall in BP after oral meds, assoc with hypoxia.  DDx:  Overmedication, PE, Also consider sepsis, occult hemorrhage, acute tamponade.      Plan:  Repeat CBC CMP, check lactic acid and troponin.  Giving one liter saline wide open.    If cannot stabilize BP, will need to transfer to unit for pressors before proceeding with further workup for etiology.      Critical care time:  35 mins       ADDENDUM 2110:  Additional 1000 ml in, systolic 82, HR still 80s.  Crackles at bases per RN.  Remains drowsy.  Note initial CXR suggestd pulm vascular congestion.      Discussed with dr. ADELINA Schaffer and will transfer to ICU for BP support.

## 2018-01-05 NOTE — NURSING NOTE
Patient blood pressure 79/57. Tech at bedside feeding patient. Patient states no distress. Respirations even and unlabored. Sat 90 percent. Dr. Hurst notified. Normal Saline bolus ordered.

## 2018-01-06 ENCOUNTER — APPOINTMENT (OUTPATIENT)
Dept: GENERAL RADIOLOGY | Facility: HOSPITAL | Age: 73
End: 2018-01-06

## 2018-01-06 LAB
ANION GAP SERPL CALCULATED.3IONS-SCNC: 6 MMOL/L (ref 3–11)
ARTERIAL PATENCY WRIST A: ABNORMAL
ATMOSPHERIC PRESS: ABNORMAL MMHG
BASE EXCESS BLDA CALC-SCNC: 7.7 MMOL/L (ref 0–2)
BASOPHILS # BLD AUTO: 0 10*3/MM3 (ref 0–0.2)
BASOPHILS NFR BLD AUTO: 0 % (ref 0–1)
BDY SITE: ABNORMAL
BUN BLD-MCNC: 17 MG/DL (ref 9–23)
BUN/CREAT SERPL: 28.3 (ref 7–25)
CALCIUM SPEC-SCNC: 8.5 MG/DL (ref 8.7–10.4)
CHLORIDE SERPL-SCNC: 96 MMOL/L (ref 99–109)
CO2 BLDA-SCNC: 33.3 MMOL/L (ref 22–33)
CO2 SERPL-SCNC: 31 MMOL/L (ref 20–31)
COHGB MFR BLD: 2.1 % (ref 0–2)
CREAT BLD-MCNC: 0.6 MG/DL (ref 0.6–1.3)
DEPRECATED RDW RBC AUTO: 49.1 FL (ref 37–54)
EOSINOPHIL # BLD AUTO: 0.01 10*3/MM3 (ref 0–0.3)
EOSINOPHIL NFR BLD AUTO: 0.1 % (ref 0–3)
ERYTHROCYTE [DISTWIDTH] IN BLOOD BY AUTOMATED COUNT: 13.9 % (ref 11.3–14.5)
FLUAV SUBTYP SPEC NAA+PROBE: NOT DETECTED
FLUBV RNA ISLT QL NAA+PROBE: NOT DETECTED
GFR SERPL CREATININE-BSD FRML MDRD: 99 ML/MIN/1.73
GLUCOSE BLD-MCNC: 122 MG/DL (ref 70–100)
GLUCOSE BLDC GLUCOMTR-MCNC: 121 MG/DL (ref 70–130)
GLUCOSE BLDC GLUCOMTR-MCNC: 123 MG/DL (ref 70–130)
GLUCOSE BLDC GLUCOMTR-MCNC: 156 MG/DL (ref 70–130)
GLUCOSE BLDC GLUCOMTR-MCNC: 173 MG/DL (ref 70–130)
HCO3 BLDA-SCNC: 32 MMOL/L (ref 20–26)
HCT VFR BLD AUTO: 30.4 % (ref 34.5–44)
HCT VFR BLD CALC: 31.1 %
HGB BLD-MCNC: 9.8 G/DL (ref 11.5–15.5)
HGB BLDA-MCNC: 10.1 G/DL (ref 14–18)
HOROWITZ INDEX BLD+IHG-RTO: 35 %
IMM GRANULOCYTES # BLD: 0.02 10*3/MM3 (ref 0–0.03)
IMM GRANULOCYTES NFR BLD: 0.2 % (ref 0–0.6)
LYMPHOCYTES # BLD AUTO: 0.44 10*3/MM3 (ref 0.6–4.8)
LYMPHOCYTES NFR BLD AUTO: 4.3 % (ref 24–44)
MAGNESIUM SERPL-MCNC: 2.4 MG/DL (ref 1.3–2.7)
MCH RBC QN AUTO: 31 PG (ref 27–31)
MCHC RBC AUTO-ENTMCNC: 32.2 G/DL (ref 32–36)
MCV RBC AUTO: 96.2 FL (ref 80–99)
METHGB BLD QL: 0.9 % (ref 0–1.5)
MODALITY: ABNORMAL
MONOCYTES # BLD AUTO: 0.24 10*3/MM3 (ref 0–1)
MONOCYTES NFR BLD AUTO: 2.4 % (ref 0–12)
NEUTROPHILS # BLD AUTO: 9.43 10*3/MM3 (ref 1.5–8.3)
NEUTROPHILS NFR BLD AUTO: 93 % (ref 41–71)
OXYHGB MFR BLDV: 91.7 % (ref 94–99)
PCO2 BLDA: 42.8 MM HG (ref 35–45)
PH BLDA: 7.48 PH UNITS (ref 7.35–7.45)
PHOSPHATE SERPL-MCNC: 2.9 MG/DL (ref 2.4–5.1)
PLATELET # BLD AUTO: 226 10*3/MM3 (ref 150–450)
PMV BLD AUTO: 8.9 FL (ref 6–12)
PO2 BLDA: 69.1 MM HG (ref 83–108)
POTASSIUM BLD-SCNC: 3.7 MMOL/L (ref 3.5–5.5)
RBC # BLD AUTO: 3.16 10*6/MM3 (ref 3.89–5.14)
SODIUM BLD-SCNC: 133 MMOL/L (ref 132–146)
TROPONIN I SERPL-MCNC: 1.44 NG/ML
WBC NRBC COR # BLD: 10.14 10*3/MM3 (ref 3.5–10.8)

## 2018-01-06 PROCEDURE — 85025 COMPLETE CBC W/AUTO DIFF WBC: CPT | Performed by: INTERNAL MEDICINE

## 2018-01-06 PROCEDURE — 25010000002 ENOXAPARIN PER 10 MG: Performed by: NURSE PRACTITIONER

## 2018-01-06 PROCEDURE — 94640 AIRWAY INHALATION TREATMENT: CPT

## 2018-01-06 PROCEDURE — 94760 N-INVAS EAR/PLS OXIMETRY 1: CPT

## 2018-01-06 PROCEDURE — 84484 ASSAY OF TROPONIN QUANT: CPT | Performed by: INTERNAL MEDICINE

## 2018-01-06 PROCEDURE — 94799 UNLISTED PULMONARY SVC/PX: CPT

## 2018-01-06 PROCEDURE — 84100 ASSAY OF PHOSPHORUS: CPT | Performed by: INTERNAL MEDICINE

## 2018-01-06 PROCEDURE — 80048 BASIC METABOLIC PNL TOTAL CA: CPT | Performed by: INTERNAL MEDICINE

## 2018-01-06 PROCEDURE — 25010000002 HYDROCORTISONE SODIUM SUCCINATE 100 MG RECONSTITUTED SOLUTION: Performed by: INTERNAL MEDICINE

## 2018-01-06 PROCEDURE — 71045 X-RAY EXAM CHEST 1 VIEW: CPT

## 2018-01-06 PROCEDURE — 36600 WITHDRAWAL OF ARTERIAL BLOOD: CPT | Performed by: INTERNAL MEDICINE

## 2018-01-06 PROCEDURE — 99291 CRITICAL CARE FIRST HOUR: CPT | Performed by: INTERNAL MEDICINE

## 2018-01-06 PROCEDURE — 82805 BLOOD GASES W/O2 SATURATION: CPT | Performed by: INTERNAL MEDICINE

## 2018-01-06 PROCEDURE — 93010 ELECTROCARDIOGRAM REPORT: CPT | Performed by: INTERNAL MEDICINE

## 2018-01-06 PROCEDURE — 93005 ELECTROCARDIOGRAM TRACING: CPT | Performed by: NURSE PRACTITIONER

## 2018-01-06 PROCEDURE — 87502 INFLUENZA DNA AMP PROBE: CPT | Performed by: INTERNAL MEDICINE

## 2018-01-06 PROCEDURE — 63710000001 INSULIN LISPRO (HUMAN) PER 5 UNITS: Performed by: INTERNAL MEDICINE

## 2018-01-06 PROCEDURE — 82962 GLUCOSE BLOOD TEST: CPT

## 2018-01-06 PROCEDURE — 83735 ASSAY OF MAGNESIUM: CPT | Performed by: INTERNAL MEDICINE

## 2018-01-06 RX ORDER — ALUMINA, MAGNESIA, AND SIMETHICONE 2400; 2400; 240 MG/30ML; MG/30ML; MG/30ML
30 SUSPENSION ORAL EVERY 6 HOURS PRN
Status: DISCONTINUED | OUTPATIENT
Start: 2018-01-06 | End: 2018-01-11 | Stop reason: HOSPADM

## 2018-01-06 RX ORDER — FUROSEMIDE 40 MG/1
40 TABLET ORAL
Status: DISCONTINUED | OUTPATIENT
Start: 2018-01-06 | End: 2018-01-11 | Stop reason: HOSPADM

## 2018-01-06 RX ORDER — LOSARTAN POTASSIUM 50 MG/1
50 TABLET ORAL ONCE
Status: COMPLETED | OUTPATIENT
Start: 2018-01-06 | End: 2018-01-06

## 2018-01-06 RX ORDER — LOSARTAN POTASSIUM 50 MG/1
50 TABLET ORAL EVERY 12 HOURS
Status: DISCONTINUED | OUTPATIENT
Start: 2018-01-06 | End: 2018-01-11 | Stop reason: HOSPADM

## 2018-01-06 RX ORDER — GABAPENTIN 300 MG/1
600 CAPSULE ORAL 3 TIMES DAILY
Status: DISCONTINUED | OUTPATIENT
Start: 2018-01-06 | End: 2018-01-11 | Stop reason: HOSPADM

## 2018-01-06 RX ADMIN — AZITHROMYCIN 250 MG: 250 TABLET, FILM COATED ORAL at 17:07

## 2018-01-06 RX ADMIN — MONTELUKAST SODIUM 10 MG: 10 TABLET, FILM COATED ORAL at 20:55

## 2018-01-06 RX ADMIN — LOSARTAN POTASSIUM 50 MG: 50 TABLET ORAL at 05:17

## 2018-01-06 RX ADMIN — ALUMINUM HYDROXIDE, MAGNESIUM HYDROXIDE, AND DIMETHICONE 30 ML: 400; 400; 40 SUSPENSION ORAL at 00:41

## 2018-01-06 RX ADMIN — LOSARTAN POTASSIUM 50 MG: 50 TABLET, FILM COATED ORAL at 17:07

## 2018-01-06 RX ADMIN — GABAPENTIN 600 MG: 300 CAPSULE ORAL at 15:25

## 2018-01-06 RX ADMIN — BUSPIRONE HYDROCHLORIDE 10 MG: 10 TABLET ORAL at 15:25

## 2018-01-06 RX ADMIN — BUSPIRONE HYDROCHLORIDE 10 MG: 10 TABLET ORAL at 20:55

## 2018-01-06 RX ADMIN — GABAPENTIN 600 MG: 300 CAPSULE ORAL at 20:55

## 2018-01-06 RX ADMIN — LEVOTHYROXINE SODIUM 50 MCG: 50 TABLET ORAL at 08:28

## 2018-01-06 RX ADMIN — INSULIN LISPRO 2 UNITS: 100 INJECTION, SOLUTION INTRAVENOUS; SUBCUTANEOUS at 11:47

## 2018-01-06 RX ADMIN — LORAZEPAM 1 MG: 1 TABLET ORAL at 08:51

## 2018-01-06 RX ADMIN — FUROSEMIDE 40 MG: 40 TABLET ORAL at 17:07

## 2018-01-06 RX ADMIN — INSULIN LISPRO 2 UNITS: 100 INJECTION, SOLUTION INTRAVENOUS; SUBCUTANEOUS at 17:07

## 2018-01-06 RX ADMIN — BUSPIRONE HYDROCHLORIDE 10 MG: 10 TABLET ORAL at 08:28

## 2018-01-06 RX ADMIN — HYDROCORTISONE SODIUM SUCCINATE 50 MG: 100 INJECTION, POWDER, FOR SOLUTION INTRAMUSCULAR; INTRAVENOUS at 00:41

## 2018-01-06 RX ADMIN — HYDROCORTISONE SODIUM SUCCINATE 50 MG: 100 INJECTION, POWDER, FOR SOLUTION INTRAMUSCULAR; INTRAVENOUS at 05:17

## 2018-01-06 RX ADMIN — ASPIRIN 81 MG: 81 TABLET ORAL at 08:27

## 2018-01-06 RX ADMIN — IPRATROPIUM BROMIDE AND ALBUTEROL SULFATE 3 ML: .5; 3 SOLUTION RESPIRATORY (INHALATION) at 21:02

## 2018-01-06 RX ADMIN — PANTOPRAZOLE SODIUM 40 MG: 40 TABLET, DELAYED RELEASE ORAL at 08:28

## 2018-01-06 RX ADMIN — LORAZEPAM 1 MG: 1 TABLET ORAL at 15:25

## 2018-01-06 RX ADMIN — IPRATROPIUM BROMIDE AND ALBUTEROL SULFATE 3 ML: .5; 3 SOLUTION RESPIRATORY (INHALATION) at 16:28

## 2018-01-06 RX ADMIN — ENOXAPARIN SODIUM 40 MG: 40 INJECTION SUBCUTANEOUS at 15:25

## 2018-01-06 RX ADMIN — GABAPENTIN 600 MG: 300 CAPSULE ORAL at 11:36

## 2018-01-06 RX ADMIN — HYDROCORTISONE SODIUM SUCCINATE 50 MG: 100 INJECTION, POWDER, FOR SOLUTION INTRAMUSCULAR; INTRAVENOUS at 11:35

## 2018-01-06 RX ADMIN — IPRATROPIUM BROMIDE AND ALBUTEROL SULFATE 3 ML: .5; 3 SOLUTION RESPIRATORY (INHALATION) at 07:44

## 2018-01-06 RX ADMIN — ATORVASTATIN CALCIUM 10 MG: 10 TABLET, FILM COATED ORAL at 08:28

## 2018-01-06 RX ADMIN — LORAZEPAM 1 MG: 1 TABLET ORAL at 21:32

## 2018-01-06 RX ADMIN — IPRATROPIUM BROMIDE AND ALBUTEROL SULFATE 3 ML: .5; 3 SOLUTION RESPIRATORY (INHALATION) at 12:20

## 2018-01-06 NOTE — PLAN OF CARE
Problem: Patient Care Overview (Adult)  Goal: Plan of Care Review  Outcome: Ongoing (interventions implemented as appropriate)   01/06/18 1636   Coping/Psychosocial Response Interventions   Plan Of Care Reviewed With patient   Patient Care Overview   Progress improving   Outcome Evaluation   Outcome Summary/Follow up Plan Pt VSS. on 4L NC. Pt OOBTC. Downgraded from ICU status. ALFONSO Toure RN.

## 2018-01-06 NOTE — PROGRESS NOTES
Intensivist Note     1/6/2018  Hospital Day: 2      Ms. Virgie Arroyo, 72 y.o. female is followed for:  Principal Problem:    Syncope and collapse on admission. Likely due to hypotension  Active Problems:    Acute Febrile illness on admission. Normal WBC    Severe COPD on home O2. 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  Returns today for syncopal event.  She awoke on floor surrounded by staff.  Upon arrival at [facility] was hypertensive on admission. Gave a history of fever to 101.6 and chills. Was initially admitted to the floor but developed sudden hypotension, worsening hypoxemia and lethargy and was found unresponsive in bed.  Patient had received multiple BP meds and some ativan (which are home meds but she is on multiple BP meds and multiple sedating meds at high doses).  Because of hypotension she received about 1.5 liters of fluid.  She developed worsening hypoxemia and was transferred to the ICU.    At the present time the patient feels reasonably well. She is afebrile, has had no further syncopal episodes, and blood pressure if anything is now high (is presently on aspirin, Cozaar, and Lipitor). Is asking to be restarted on her home dose of Neurontin. Is also on empiric antimicrobial therapy with oral Zithromax also receiving standard bronchodilator therapy, Singulair, as well as ulcer and DVT prophylaxis. I note she was also placed on hydrocortisone 50 mg every 6 hours yesterday but it is unclear why. Since not wheezing  "will decrease dose. No fever or leukocytosis so we will continue with above Zithromax.    The patient's relevant past medical, surgical and social history were reviewed and updated in Epic as appropriate.    OBJECTIVE     /91  Pulse 105  Temp 98.5 °F (36.9 °C) (Oral)   Resp 18  Ht 152.4 cm (60\")  Wt 83.2 kg (183 lb 6 oz)  SpO2 93%  BMI 35.81 kg/m2  Flow (L/min): 4    Flowsheet Rows         First Filed Value    Admission Height  152.4 cm (60\") Documented at 01/04/2018 0634    Admission Weight  83.9 kg (185 lb) Documented at 01/04/2018 0634        Intake & Output (last day)       01/05 0701 - 01/06 0700 01/06 0701 - 01/07 0700    I.V. (mL/kg)      IV Piggyback      Total Intake(mL/kg)      Urine (mL/kg/hr) 1045 (0.5) 200 (0.3)    Stool 0 (0) 0 (0)    Total Output 1045 200    Net -1045 -200          Unmeasured Stool Occurrence 3 x 1 x          Objective    Exam:  General Exam:  Older appearing white female in NAD  HEENT: Pupils equal and reactive. Nose and throat clear.  Neck:                          Supple, no JVD, thyromegaly, or adenopathy  Lungs: Clear today (was apparently wheezing yesterday).  Cardiovascular: Regular rate and rhythm without murmurs or gallops.  Abdomen: Soft nontender without organomegaly or masses.   and rectal: Fontana catheter in place  Extremities: No cyanosis clubbing edema.  Neurologic:                 Symmetric strength. No focal deficits. Is no longer confused    Chest X-Ray 1/6/18: Mild cardiomegaly but no evidence of decompensation. Questionable right infrahilar infiltrate that may just be film technique.    INFUSIONS         Results from last 7 days  Lab Units 01/06/18  0353 01/05/18  0430 01/04/18 2028   WBC 10*3/mm3 10.14 12.24* 8.45   HEMOGLOBIN g/dL 9.8* 11.0* 10.1*   HEMATOCRIT % 30.4* 34.0* 31.4*   PLATELETS 10*3/mm3 226 175 228       Results from last 7 days  Lab Units 01/06/18  0353 01/05/18  0646   SODIUM mmol/L 133 133   POTASSIUM mmol/L 3.7 4.5   CHLORIDE " mmol/L 96* 93*   CO2 mmol/L 31.0 31.0   BUN mg/dL 17 20   CREATININE mg/dL 0.60 0.80   GLUCOSE mg/dL 122* 107*   CALCIUM mg/dL 8.5* 8.2*       Results from last 7 days  Lab Units 01/06/18  0353 01/05/18  0646 01/04/18 2028   MAGNESIUM mg/dL 2.4 2.7 1.9   PHOSPHORUS mg/dL 2.9 5.1  --        No results found for: SEDRATE  Lab Results   Component Value Date    .0 (H) 01/05/2018     Lab Results   Component Value Date    TROPONINI 1.443 (C) 01/06/2018     Lab Results   Component Value Date    TSH 0.845 12/21/2017     Lab Results   Component Value Date    LACTATE 1.7 01/05/2018     No results found for: CORTISOL      Results from last 7 days  Lab Units 01/06/18  0356 01/05/18  0946 01/05/18 0427 01/04/18 2120 01/04/18 2014   PH, ARTERIAL pH units 7.482* 7.490* 7.435 7.431 7.483*   PCO2, ARTERIAL mm Hg 42.8 42.6 48.2* 46.6* 43.0   PO2 ART mm Hg 69.1* 90.3 89.8 56.0* 73.5*   HCO3 ART mmol/L 32.0* 32.4* 32.3* 31.0* 32.2*   FIO2 % 35 45 50 40 40       I reviewed the patient's results, images and medication.    Assessment/Plan   ASSESSMENT      Principal Problem:    Syncope and collapse on admission. Likely due to hypotension  Active Problems:    Acute Febrile illness on admission. Normal WBC    Severe COPD on home O2. 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 Blood pressure has increased significantly today so we will resume her oral Lasix and increase her Cozaar to twice daily. I do not want to be aggressive in resuming antihypertensives at her home dose as the reason she was moved into the ICU was because of hypotension.    PLAN     1. Resume Lasix 40 mg  a day and increase Cozaar to 50 mg twice a day (previously on 100 mg a.m. and 50 mg by mouth)  2. Can probably transfer to the floor  3. Discontinue Fontana discontinued strict I&O  4. Up in chair  5. Physical therapy/occupational therapy    Plan of care and goals reviewed with mulitdisciplinary team at daily rounds.    I discussed the patient's findings and my recommendations with patient, family and nursing staff    Time spent Critical care 32 min (It does not include procedure time).    Carlin Benton MD  Intensive Care Medicine  01/06/18 3:53 PM

## 2018-01-06 NOTE — PLAN OF CARE
Problem: Patient Care Overview (Adult)  Goal: Plan of Care Review  Outcome: Ongoing (interventions implemented as appropriate)   01/06/18 0623   Coping/Psychosocial Response Interventions   Plan Of Care Reviewed With patient   Patient Care Overview   Progress improving   Outcome Evaluation   Outcome Summary/Follow up Plan Pt attempted NC after eating dinner, but sats began to drop and placed back on BiPAP. Pt wore BiPAP and tolerated well all night. BP steadily increasing, pt's nightime Cozaar dose reordered.      Goal: Adult Individualization and Mutuality  Outcome: Ongoing (interventions implemented as appropriate)    Goal: Discharge Needs Assessment  Outcome: Ongoing (interventions implemented as appropriate)      Problem: Anxiety (Adult)  Goal: Identify Related Risk Factors and Signs and Symptoms  Outcome: Ongoing (interventions implemented as appropriate)    Goal: Reduction/Resolution  Outcome: Ongoing (interventions implemented as appropriate)      Problem: Fall Risk (Adult)  Goal: Identify Related Risk Factors and Signs and Symptoms  Outcome: Ongoing (interventions implemented as appropriate)    Goal: Absence of Falls  Outcome: Ongoing (interventions implemented as appropriate)      Problem: Respiratory Insufficiency (Adult)  Goal: Identify Related Risk Factors and Signs and Symptoms  Outcome: Ongoing (interventions implemented as appropriate)    Goal: Acid/Base Balance  Outcome: Ongoing (interventions implemented as appropriate)    Goal: Effective Ventilation  Outcome: Ongoing (interventions implemented as appropriate)      Problem: Pressure Ulcer Risk (Yoseph Scale) (Adult,Obstetrics,Pediatric)  Goal: Identify Related Risk Factors and Signs and Symptoms  Outcome: Ongoing (interventions implemented as appropriate)    Goal: Skin Integrity  Outcome: Ongoing (interventions implemented as appropriate)

## 2018-01-07 PROBLEM — A41.9 SEPSIS (HCC): Status: ACTIVE | Noted: 2018-01-07

## 2018-01-07 PROBLEM — J18.9 PNEUMONIA: Status: ACTIVE | Noted: 2018-01-07

## 2018-01-07 LAB
ALBUMIN SERPL-MCNC: 3.6 G/DL (ref 3.2–4.8)
ALBUMIN/GLOB SERPL: 1.6 G/DL (ref 1.5–2.5)
ALP SERPL-CCNC: 76 U/L (ref 25–100)
ALT SERPL W P-5'-P-CCNC: 34 U/L (ref 7–40)
ANION GAP SERPL CALCULATED.3IONS-SCNC: 6 MMOL/L (ref 3–11)
AST SERPL-CCNC: 30 U/L (ref 0–33)
B PERT.PT PRMT NPH QL NAA+NON-PROBE: NOT DETECTED
BASOPHILS # BLD AUTO: 0 10*3/MM3 (ref 0–0.2)
BASOPHILS NFR BLD AUTO: 0 % (ref 0–1)
BILIRUB SERPL-MCNC: 0.6 MG/DL (ref 0.3–1.2)
BUN BLD-MCNC: 14 MG/DL (ref 9–23)
BUN/CREAT SERPL: 20 (ref 7–25)
C PNEUM DNA NPH QL NAA+NON-PROBE: NOT DETECTED
CALCIUM SPEC-SCNC: 8.9 MG/DL (ref 8.7–10.4)
CHLORIDE SERPL-SCNC: 96 MMOL/L (ref 99–109)
CO2 SERPL-SCNC: 32 MMOL/L (ref 20–31)
CREAT BLD-MCNC: 0.7 MG/DL (ref 0.6–1.3)
DEPRECATED RDW RBC AUTO: 49.4 FL (ref 37–54)
EOSINOPHIL # BLD AUTO: 0.02 10*3/MM3 (ref 0–0.3)
EOSINOPHIL NFR BLD AUTO: 0.3 % (ref 0–3)
ERYTHROCYTE [DISTWIDTH] IN BLOOD BY AUTOMATED COUNT: 13.9 % (ref 11.3–14.5)
FLUAV H1 RNA NPH QL NAA+NON-PROBE: NOT DETECTED
FLUAV H3 RNA NPH QL NAA+NON-PROBE: NOT DETECTED
FLUAV RNA SPEC QL NAA+PROBE: NOT DETECTED
FLUBV RNA SPEC QL NAA+PROBE: NOT DETECTED
GFR SERPL CREATININE-BSD FRML MDRD: 82 ML/MIN/1.73
GLOBULIN UR ELPH-MCNC: 2.3 GM/DL
GLUCOSE BLD-MCNC: 116 MG/DL (ref 70–100)
GLUCOSE BLDC GLUCOMTR-MCNC: 129 MG/DL (ref 70–130)
GLUCOSE BLDC GLUCOMTR-MCNC: 143 MG/DL (ref 70–130)
GLUCOSE BLDC GLUCOMTR-MCNC: 153 MG/DL (ref 70–130)
GLUCOSE BLDC GLUCOMTR-MCNC: 154 MG/DL (ref 70–130)
HADV DNA SPEC QL NAA+PROBE: NOT DETECTED
HCOV 229E RNA NPH QL NAA+NON-PROBE: NOT DETECTED
HCOV HKU1 RNA NPH QL NAA+NON-PROBE: NOT DETECTED
HCOV NL63 RNA NPH QL NAA+NON-PROBE: NOT DETECTED
HCOV OC43 RNA NPH QL NAA+NON-PROBE: NOT DETECTED
HCT VFR BLD AUTO: 31.1 % (ref 34.5–44)
HGB BLD-MCNC: 10.1 G/DL (ref 11.5–15.5)
HMPV RNA SPEC QL NAA+PROBE: NOT DETECTED
HPIV1 RNA SPEC QL NAA+PROBE: NOT DETECTED
HPIV2 SPEC QL CULT: NOT DETECTED
HPIV3 SPEC QL CULT: NOT DETECTED
HPIV4 RNA NPH QL NAA+NON-PROBE: NOT DETECTED
IMM GRANULOCYTES # BLD: 0.02 10*3/MM3 (ref 0–0.03)
IMM GRANULOCYTES NFR BLD: 0.3 % (ref 0–0.6)
INR PPP: NOT DETECTED
LYMPHOCYTES # BLD AUTO: 0.77 10*3/MM3 (ref 0.6–4.8)
LYMPHOCYTES NFR BLD AUTO: 10.2 % (ref 24–44)
M PNEUMO DNA SPEC QL NAA+PROBE: NOT DETECTED
MAGNESIUM SERPL-MCNC: 2 MG/DL (ref 1.3–2.7)
MCH RBC QN AUTO: 31.4 PG (ref 27–31)
MCHC RBC AUTO-ENTMCNC: 32.5 G/DL (ref 32–36)
MCV RBC AUTO: 96.6 FL (ref 80–99)
MONOCYTES # BLD AUTO: 0.22 10*3/MM3 (ref 0–1)
MONOCYTES NFR BLD AUTO: 2.9 % (ref 0–12)
NEUTROPHILS # BLD AUTO: 6.54 10*3/MM3 (ref 1.5–8.3)
NEUTROPHILS NFR BLD AUTO: 86.3 % (ref 41–71)
PHOSPHATE SERPL-MCNC: 3.1 MG/DL (ref 2.4–5.1)
PLATELET # BLD AUTO: 212 10*3/MM3 (ref 150–450)
PMV BLD AUTO: 8.7 FL (ref 6–12)
POTASSIUM BLD-SCNC: 4.1 MMOL/L (ref 3.5–5.5)
PROT SERPL-MCNC: 5.9 G/DL (ref 5.7–8.2)
RBC # BLD AUTO: 3.22 10*6/MM3 (ref 3.89–5.14)
RHINOVIRUS RNA SPEC QL NAA+PROBE: NOT DETECTED
RSV AG SPEC QL: NOT DETECTED
SODIUM BLD-SCNC: 134 MMOL/L (ref 132–146)
WBC NRBC COR # BLD: 7.57 10*3/MM3 (ref 3.5–10.8)

## 2018-01-07 PROCEDURE — 83735 ASSAY OF MAGNESIUM: CPT | Performed by: INTERNAL MEDICINE

## 2018-01-07 PROCEDURE — 99233 SBSQ HOSP IP/OBS HIGH 50: CPT | Performed by: FAMILY MEDICINE

## 2018-01-07 PROCEDURE — 94760 N-INVAS EAR/PLS OXIMETRY 1: CPT

## 2018-01-07 PROCEDURE — 80053 COMPREHEN METABOLIC PANEL: CPT | Performed by: INTERNAL MEDICINE

## 2018-01-07 PROCEDURE — 82962 GLUCOSE BLOOD TEST: CPT

## 2018-01-07 PROCEDURE — 97165 OT EVAL LOW COMPLEX 30 MIN: CPT

## 2018-01-07 PROCEDURE — 94799 UNLISTED PULMONARY SVC/PX: CPT

## 2018-01-07 PROCEDURE — 25010000002 HYDROCORTISONE SODIUM SUCCINATE 100 MG RECONSTITUTED SOLUTION: Performed by: INTERNAL MEDICINE

## 2018-01-07 PROCEDURE — 97162 PT EVAL MOD COMPLEX 30 MIN: CPT

## 2018-01-07 PROCEDURE — 94640 AIRWAY INHALATION TREATMENT: CPT

## 2018-01-07 PROCEDURE — 94660 CPAP INITIATION&MGMT: CPT

## 2018-01-07 PROCEDURE — 85025 COMPLETE CBC W/AUTO DIFF WBC: CPT | Performed by: INTERNAL MEDICINE

## 2018-01-07 PROCEDURE — 25010000002 ENOXAPARIN PER 10 MG: Performed by: NURSE PRACTITIONER

## 2018-01-07 PROCEDURE — 84100 ASSAY OF PHOSPHORUS: CPT | Performed by: INTERNAL MEDICINE

## 2018-01-07 RX ORDER — SACCHAROMYCES BOULARDII 250 MG
250 CAPSULE ORAL 2 TIMES DAILY
Status: DISCONTINUED | OUTPATIENT
Start: 2018-01-07 | End: 2018-01-11 | Stop reason: HOSPADM

## 2018-01-07 RX ORDER — AMOXICILLIN AND CLAVULANATE POTASSIUM 875; 125 MG/1; MG/1
1 TABLET, FILM COATED ORAL EVERY 12 HOURS SCHEDULED
Status: DISCONTINUED | OUTPATIENT
Start: 2018-01-07 | End: 2018-01-11 | Stop reason: HOSPADM

## 2018-01-07 RX ORDER — ISOSORBIDE MONONITRATE 60 MG/1
60 TABLET, EXTENDED RELEASE ORAL DAILY
Status: DISCONTINUED | OUTPATIENT
Start: 2018-01-08 | End: 2018-01-11 | Stop reason: HOSPADM

## 2018-01-07 RX ADMIN — HYDROCORTISONE SODIUM SUCCINATE 50 MG: 100 INJECTION, POWDER, FOR SOLUTION INTRAMUSCULAR; INTRAVENOUS at 01:23

## 2018-01-07 RX ADMIN — IPRATROPIUM BROMIDE AND ALBUTEROL SULFATE 3 ML: .5; 3 SOLUTION RESPIRATORY (INHALATION) at 15:57

## 2018-01-07 RX ADMIN — METOPROLOL TARTRATE 25 MG: 25 TABLET ORAL at 20:19

## 2018-01-07 RX ADMIN — LORAZEPAM 1 MG: 1 TABLET ORAL at 08:44

## 2018-01-07 RX ADMIN — AMOXICILLIN AND CLAVULANATE POTASSIUM 1 TABLET: 875; 125 TABLET, FILM COATED ORAL at 20:19

## 2018-01-07 RX ADMIN — ENOXAPARIN SODIUM 40 MG: 40 INJECTION SUBCUTANEOUS at 16:20

## 2018-01-07 RX ADMIN — LEVOTHYROXINE SODIUM 50 MCG: 50 TABLET ORAL at 08:44

## 2018-01-07 RX ADMIN — ALUMINUM HYDROXIDE, MAGNESIUM HYDROXIDE, AND DIMETHICONE 30 ML: 400; 400; 40 SUSPENSION ORAL at 20:19

## 2018-01-07 RX ADMIN — IPRATROPIUM BROMIDE AND ALBUTEROL SULFATE 3 ML: .5; 3 SOLUTION RESPIRATORY (INHALATION) at 12:24

## 2018-01-07 RX ADMIN — GABAPENTIN 600 MG: 300 CAPSULE ORAL at 16:21

## 2018-01-07 RX ADMIN — Medication 250 MG: at 12:18

## 2018-01-07 RX ADMIN — INSULIN LISPRO 2 UNITS: 100 INJECTION, SOLUTION INTRAVENOUS; SUBCUTANEOUS at 21:28

## 2018-01-07 RX ADMIN — GABAPENTIN 600 MG: 300 CAPSULE ORAL at 20:19

## 2018-01-07 RX ADMIN — BUSPIRONE HYDROCHLORIDE 10 MG: 10 TABLET ORAL at 16:21

## 2018-01-07 RX ADMIN — BUSPIRONE HYDROCHLORIDE 10 MG: 10 TABLET ORAL at 20:19

## 2018-01-07 RX ADMIN — GABAPENTIN 600 MG: 300 CAPSULE ORAL at 08:44

## 2018-01-07 RX ADMIN — HYDROCORTISONE SODIUM SUCCINATE 50 MG: 100 INJECTION, POWDER, FOR SOLUTION INTRAMUSCULAR; INTRAVENOUS at 12:19

## 2018-01-07 RX ADMIN — LOSARTAN POTASSIUM 50 MG: 50 TABLET, FILM COATED ORAL at 05:52

## 2018-01-07 RX ADMIN — ASPIRIN 81 MG: 81 TABLET ORAL at 08:44

## 2018-01-07 RX ADMIN — LORAZEPAM 1 MG: 1 TABLET ORAL at 20:19

## 2018-01-07 RX ADMIN — ACETAMINOPHEN 650 MG: 325 TABLET, FILM COATED ORAL at 01:27

## 2018-01-07 RX ADMIN — LOSARTAN POTASSIUM 50 MG: 50 TABLET, FILM COATED ORAL at 18:04

## 2018-01-07 RX ADMIN — AZITHROMYCIN 250 MG: 250 TABLET, FILM COATED ORAL at 18:04

## 2018-01-07 RX ADMIN — MONTELUKAST SODIUM 10 MG: 10 TABLET, FILM COATED ORAL at 20:19

## 2018-01-07 RX ADMIN — AMOXICILLIN AND CLAVULANATE POTASSIUM 1 TABLET: 875; 125 TABLET, FILM COATED ORAL at 12:18

## 2018-01-07 RX ADMIN — FUROSEMIDE 40 MG: 40 TABLET ORAL at 08:44

## 2018-01-07 RX ADMIN — IPRATROPIUM BROMIDE AND ALBUTEROL SULFATE 3 ML: .5; 3 SOLUTION RESPIRATORY (INHALATION) at 07:37

## 2018-01-07 RX ADMIN — PANTOPRAZOLE SODIUM 40 MG: 40 TABLET, DELAYED RELEASE ORAL at 08:44

## 2018-01-07 RX ADMIN — IPRATROPIUM BROMIDE AND ALBUTEROL SULFATE 3 ML: .5; 3 SOLUTION RESPIRATORY (INHALATION) at 20:28

## 2018-01-07 RX ADMIN — Medication 250 MG: at 18:04

## 2018-01-07 RX ADMIN — ATORVASTATIN CALCIUM 10 MG: 10 TABLET, FILM COATED ORAL at 08:44

## 2018-01-07 RX ADMIN — BUSPIRONE HYDROCHLORIDE 10 MG: 10 TABLET ORAL at 08:44

## 2018-01-07 NOTE — PLAN OF CARE
Problem: Patient Care Overview (Adult)  Goal: Plan of Care Review  Outcome: Ongoing (interventions implemented as appropriate)   01/06/18 1636 01/06/18 2000 01/07/18 0532   Coping/Psychosocial Response Interventions   Plan Of Care Reviewed With --  patient --    Patient Care Overview   Progress improving --  --    Outcome Evaluation   Outcome Summary/Follow up Plan --  --  pt's blood pressure has increased this morning, but has po cozaar ordered. pt c/o some anxiety after having BiPAP placed but seemed to resolve after PRN ativan. otherwise no complaints.       Problem: Anxiety (Adult)  Goal: Identify Related Risk Factors and Signs and Symptoms  Outcome: Ongoing (interventions implemented as appropriate)   01/06/18 0623   Anxiety   Related Risk Factors (Anxiety) knowledge deficit;environment change;health status change   Signs and Symptoms (Anxiety) apprehension/being worried     Goal: Reduction/Resolution  Outcome: Ongoing (interventions implemented as appropriate)   01/07/18 0532   Anxiety (Adult)   Reduction/Resolution making progress toward outcome

## 2018-01-07 NOTE — PLAN OF CARE
Problem: Patient Care Overview (Adult)  Goal: Plan of Care Review  Outcome: Ongoing (interventions implemented as appropriate)   01/07/18 1401   Coping/Psychosocial Response Interventions   Plan Of Care Reviewed With patient   Outcome Evaluation   Outcome Summary/Follow up Plan Pt limited by respiratory status; desat to upper 60's with ambulation on 5L; returned to 90% in 1 minute; deficits in ADL performance, fxl mobility, occupational endurance; CGA for bed mobility and STS; CGA x 1+1 to ambulate into hallway/VCs for adaptive breathing; recommend IP rehab at discharge.       Problem: Inpatient Occupational Therapy  Goal: Patient Education Goal LTG- OT  Outcome: Ongoing (interventions implemented as appropriate)   01/07/18 1401   Patient Education OT LTG   Patient Education OT LTG, Date Established 01/07/18   Patient Education OT LTG, Time to Achieve 1 wk   Patient Education OT LTG, Education Type HEP;adaptive breathing;energy conservation   Patient Education OT LTG, Education Understanding verbalizes understanding   Patient Education OT LTG Outcome goal ongoing     Goal: Toileting Goal LTG- OT  Outcome: Ongoing (interventions implemented as appropriate)   01/07/18 1401   Toileting OT LTG   Toileting Goal OT LTG, Date Established 01/07/18   Toileting Goal OT LTG, Time to Achieve 1 wk   Toileting Goal OT LTG, [place] Level supervision required   Toileting Goal OT LTG, Outcome goal ongoing     Goal: LB Dressing Goal LTG- OT  Outcome: Ongoing (interventions implemented as appropriate)   01/07/18 1401   LB Dressing OT LTG   LB Dressing Goal OT LTG, Date Established 01/07/18   LB Dressing Goal OT LTG, Time to Achieve 1 wk   LB Dressing Goal OT LTG, [place] Level minimum assist (75% patient effort);verbal cues required  (don/doff socks)   LB Dressing Goal OT LTG, Outcome goal ongoing

## 2018-01-07 NOTE — PROGRESS NOTES
[facility] Medicine Services  PROGRESS NOTE    Patient Name: Virgie Arroyo  : 1945  MRN: [id number]    Date of Admission: 2018  Length of Stay: 3  Primary Care Physician: Nick Mcginnis MD    Subjective   Subjective     CC:  Sepsis, pneumonia    HPI:  Patient sitting up in bed, alert and talkative.  States she feels markedly improved from 2 days ago, however she is still globally weak and feels that she lost some of her rehabilitation gains over the last few days laying in the bed here in the hospital being ill.  She complains of ongoing mildly productive cough but otherwise no new complaints.    Review of Systems  Gen- No fevers, chills  CV- No chest pain, palpitations  Resp- (+) cough,  Mild HITCHCOCK  GI- No N/V/D, abd pain    Otherwise ROS is negative except as mentioned in the HPI.    Objective   Objective     Vital Signs:   Temp:  [98 °F (36.7 °C)-98.6 °F (37 °C)] 98 °F (36.7 °C)  Heart Rate:  [70-86] 82  Resp:  [18-20] 20  BP: (141-182)/() 141/84        Physical Exam:  Constitutional: No acute distress, awake, alert  HENT: NCAT, mucous membranes moist  Respiratory: Mild posterior deep tendon right rales, dull in left base and otherwise clear, respiratory effort normal   Cardiovascular: RRR  Gastrointestinal: Positive bowel sounds, soft, nontender, nondistended  Musculoskeletal: No bilateral ankle edema  Psychiatric: Appropriate affect, cooperative  Neurologic: Oriented x 3, movements symmetric in all extremities, Cranial Nerves grossly intact to confrontation, speech clear  Skin: No rashes      Results Reviewed:  I have personally reviewed current lab, radiology, and data and agree.      Results from last 7 days  Lab Units 18  0814 18  0353 18  0430   WBC 10*3/mm3 7.57 10.14 12.24*   HEMOGLOBIN g/dL 10.1* 9.8* 11.0*   HEMATOCRIT % 31.1* 30.4* 34.0*   PLATELETS 10*3/mm3 212 226 175       Results from last 7 days  Lab Units 18  0814  01/06/18  0353 01/05/18  0646 01/04/18  2028   SODIUM mmol/L 134 133 133 131*   POTASSIUM mmol/L 4.1 3.7 4.5 4.7   CHLORIDE mmol/L 96* 96* 93* 92*   CO2 mmol/L 32.0* 31.0 31.0 32.0*   BUN mg/dL 14 17 20 20   CREATININE mg/dL 0.70 0.60 0.80 1.20   GLUCOSE mg/dL 116* 122* 107* 123*   CALCIUM mg/dL 8.9 8.5* 8.2* 8.3*   ALT (SGPT) U/L 34  --  32 31   AST (SGOT) U/L 30  --  31 20   TROPONIN I ng/mL  --  1.443* 4.637* 0.428*     BNP   Date Value Ref Range Status   01/05/2018 261.0 (H) 0.0 - 100.0 pg/mL Final     pH, Arterial   Date Value Ref Range Status   01/06/2018 7.482 (H) 7.350 - 7.450 pH units Final       Microbiology Results Abnormal     Procedure Component Value - Date/Time    Blood Culture - Blood, [[id number]]  (Normal) Collected:  01/04/18 1440    Lab Status:  Preliminary result Specimen:  Blood from Arm, Right Updated:  01/07/18 1516     Blood Culture No growth at 3 days    Blood Culture - Blood, [[id number]]  (Normal) Collected:  01/04/18 1445    Lab Status:  Preliminary result Specimen:  Blood from Hand, Left Updated:  01/07/18 1516     Blood Culture No growth at 3 days    Respiratory Panel, PCR - Swab, Nasopharynx [[id number]] Collected:  01/05/18 0609    Lab Status:  Final result Specimen:  Swab from Nasopharynx Updated:  01/07/18 0640     Adenovirus Detection by PCR Not Detected     Coronavirus HKU1 Not Detected     Coronavirus NL63 Not Detected     Coronavirus 229E Not Detected     Coronavirus OC43 Not Detected     Human Metapneumovirus Not Detected     Human Rhinovirus/Enterovirus Not Detected     Influenza A PCR Not Detected     Influenza A H1 Not Detected     Influenza 2009 H1N1 by PCR Not Detected     Influenza A H3 Not Detected     Influenza B PCR Not Detected     Parainfluenza Virus 1 Not Detected     Parainfluenza Virus 2 Not Detected     Parainfluenza Virus 3 Not Detected     Parainfluenza Virus 4 Not Detected     Respiratory Syncytial Virus Not Detected     Bordetella pertussis pcr Not Detected      Chlamydophila pneumoniae PCR Not Detected     Mycoplasma pneumo by PCR Not Detected    Narrative:       Performed at:  01 - [facility]  [address], [place], [place]  [id number]  : Jose Alfredo Cody MD, Phone:  [phone]    Influenza A & B, RT PCR - Swab, Nasopharynx [[id number]]  (Normal) Collected:  01/06/18 1627    Lab Status:  Final result Specimen:  Swab from Nasopharynx Updated:  01/06/18 1736     Influenza A PCR Not Detected     Influenza B PCR Not Detected    Influenza A & B, RT PCR - Swab, Nasopharynx [[id number]]  (Normal) Collected:  01/04/18 1401    Lab Status:  Final result Specimen:  Swab from Nasopharynx Updated:  01/04/18 1503     Influenza A PCR Not Detected     Influenza B PCR Not Detected          Imaging Results (last 24 hours)     ** No results found for the last 24 hours. **        Results for orders placed during the hospital encounter of 12/20/17   Adult Transthoracic Echo Complete W/ Cont if Necessary Per Protocol    Narrative · Left ventricular systolic function is normal.  · Estimated EF appears to be in the range of 66 - 70%.  · Left ventricular diastolic dysfunction (grade I) consistent with   impaired relaxation.  · Left ventricular wall thickness is consistent with mild concentric   hypertrophy.  · Mild aortic valve regurgitation is present.  · Mild to moderate tricuspid valve regurgitation is present.  · Estimated right ventricular systolic pressure from tricuspid   regurgitation is moderately elevated (45-55 mmHg).          I have reviewed the medications.    Assessment/Plan   Assessment / Plan     Hospital Problem List     * (Principal)Sepsis    Overview Signed 1/7/2018  9:19 AM by Kika Valle MD     Fever, hypotension, WBC>12, RML/RLL pneumonia         Hypertension    Hypothyroidism    Dependence on supplemental oxygen    Overview Signed 8/5/2016 10:21 AM by Jake Mtz     · A.  4 L at rest and up to 6 with exertion.            Secondary pulmonary arterial  hypertension due to COPD/ELIZABTEH    Overview Signed 12/21/2017  4:58 PM by William Parr MD     Group 3 due to COPD         Severe COPD on home O2 baseline 4 to 6 Liters    Depression with anxiety    Chronic diastolic congestive heart failure    GERD (gastroesophageal reflux disease)    HLD (hyperlipidemia)    Mixed Respiratory failure, acute-on-chronic    Elevated troponin    Syncope and collapse on admission due to hypotension of sepsis    Pneumonia             Brief Hospital Course to date:  Virgie Arroyo is a 72 y.o. female w/ h/o HTN, HLD, Diastolic HF, COPD on home O2, ELIZABETH on CPAP, recent admission to [facility] 12/20 to 12/27 for AECOPD, was transferred to [facility].  Brought to [facility] ED on 1/4/2018 after witnessed syncopal event.  There was report of a fever to 101.6 and in the ED developed sudden hypotension, worsening hypoxemia and lethargy and was found unresponsive in bed.  He was admitted to the ICU and improved with fluid resuscitation as well as BiPap.  Initial chest x-ray was indeterminant but after appropriate hydration repeat showed obvious RML/RLL pneumonia:    Assessment & Plan:  - New to floor from ICU, patient is now nontoxic and markedly improved  - BPs are returning to her hypertensive baseline and likely now can begin to add back her outpatient antihypertensives since sepsis is resolved.  In ICU her home Lasix had been reinitiated, also her home Cozaar but at a decreased dose which we will leave for now. Restart Metoprolol tonight, tomorrow we will add back Imdur with morning meds if BP's alllow.  - Will transition antibiotics to by mouth equivalents for remaining course  - Alert CM that patient is appropriate for return to [facility], they can begin recertification process    DVT Prophylaxis:  Lovenox    CODE STATUS: Full Code    Disposition: I expect the patient to be discharged to [facility] once bed available    Kika Valle MD  01/07/18  6:32 PM

## 2018-01-07 NOTE — PLAN OF CARE
Problem: Patient Care Overview (Adult)  Goal: Plan of Care Review  Outcome: Ongoing (interventions implemented as appropriate)   01/07/18 1405   Coping/Psychosocial Response Interventions   Plan Of Care Reviewed With patient   Outcome Evaluation   Outcome Summary/Follow up Plan PT initial eval completed. Pt reports dyspnea with gait training and O2 sats drop into upper 60s on supp O2 with gait despite pursed lip breathing and slow zunilda. O2 sats returned to 91% within one minute of resting/sitting. She would benefit from rollator upon dc. She would also benefit from cont IPPT and IP rehab upon dc to improve strength and independence.        Problem: Inpatient Physical Therapy  Goal: Transfer Training Goal 1 LTG- PT   01/07/18 1405   Transfer Training PT LTG   Transfer Training PT LTG, Date Established 01/07/18   Transfer Training PT LTG, Time to Achieve by discharge   Transfer Training PT LTG, Activity Type all transfers   Transfer Training PT LTG, [place] Level conditional independence   Transfer Training PT LTG, Assist Device walker, rolling     Goal: Gait Training Goal LTG- PT   01/07/18 1405   Gait Training PT LTG   Gait Training Goal PT LTG, Date Established 01/07/18   Gait Training Goal PT LTG, Time to Achieve by discharge   Gait Training Goal PT LTG, [place] Level conditional independence   Gait Training Goal PT LTG, Assist Device walker, rolling   Gait Training Goal PT LTG, Distance to Achieve 100     Goal: Cardiopulmonary Goal LTG- PT   01/07/18 1405   Cardiopulmonary PT LTG   Cardiopulmonary PT LTG, Date Established 01/07/18   Cardiopulmonary PT LTG, Time to Achieve by discharge   Cardiopulmonary PT LTG, Additional Goal Pt able to maintain O2 sats at or above 85% during mobility.

## 2018-01-07 NOTE — THERAPY EVALUATION
Acute Care - Occupational Therapy Initial Evaluation  [facility]     Patient Name: Virgie Arroyo  : 1945  MRN: [id number]  Today's Date: 2018  Onset of Illness/Injury or Date of Surgery Date: 18  Date of Referral to OT: 18  Referring Physician: IRVING Gutierrez    Admit Date: 2018       ICD-10-CM ICD-9-CM   1. Elevated troponin R74.8 790.6   2. Syncope, unspecified syncope type R55 780.2   3. Chronic obstructive pulmonary disease, unspecified COPD type J44.9 496   4. Essential hypertension I10 401.9   5. 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/ELIZABETH   • Severe COPD on home O2 baseline 4 to 6 Liters   • Depression with anxiety   • Chronic diastolic congestive heart failure   • GERD (gastroesophageal reflux disease)   • HLD (hyperlipidemia)   • COPD with exacerbation   • Acute cystitis without hematuria   • Mixed Respiratory failure, acute-on-chronic   • Chronic hyponatremia   • Elevated troponin   • Syncope and collapse on admission due to hypotension of sepsis   • Sepsis   • Pneumonia     Past Medical History:   Diagnosis Date   • COPD, severe    • Dependence on supplemental oxygen    • Depression with anxiety    • Diastolic congestive heart failure    • Genital herpes    • GERD (gastroesophageal reflux disease)    • Hiatal hernia    • History of alcoholism    • History of cervical cancer    • History of ileus 2013   • HLD (hyperlipidemia)    • Hypertension    • Hypothyroidism    • Osteoarthritis    • Seasonal allergies      Past Surgical History:   Procedure Laterality Date   • APPENDECTOMY     • CATARACT EXTRACTION Bilateral    • DILATATION AND CURETTAGE     • TONSILLECTOMY     • TOTAL HIP ARTHROPLASTY Right           OT ASSESSMENT FLOWSHEET (last 72 hours)      OT Evaluation       18 1400  01/07/18 1306 01/07/18 1305 01/05/18 1501 01/05/18 1344    Rehab Evaluation    Document Type  evaluation  -SR evaluation  -TA      Subjective Information  agree to therapy;complains of;weakness;dyspnea  -SR agree to therapy;complains of;weakness  -TA      Evaluation Not Performed     unable to evaluate, medical status change   Pt requiring bipap at this time and not medically appropriate for participation. RN requested that PT check on pt tomorrow.   -LS    Patient Effort, Rehab Treatment  excellent  -SR good  -TA      Symptoms Noted During/After Treatment  fatigue;shortness of breath  -SR shortness of breath;significant change in vital signs;other (see comments)   desat to upper 60's; returned to 90% in 1 min/5L  -TA      General Information    Patient Profile Review  yes  -SR yes  -TA      Onset of Illness/Injury or Date of Surgery Date  01/04/18  -SR 01/04/18  -TA      Referring Physician  IRVING Gutierrez  -SR Dr Betnon  -TA      General Observations  Pt is supine in bed, has O2, no family present.  -SR       Pertinent History Of Current Problem  Pt with hx of COPD was recently dc from hospital to [facility], admitted from [facility] after sycopal event, increased cough/SOA.  -SR Pt most recently at [facility] and had syncopal event with fall, LOC. Admitted d/t syncope and elevated troponin. Pt uses 2L O2 at home 24/7  -TA      Precautions/Limitations  fall precautions;oxygen therapy device and L/min  -SR fall precautions;oxygen therapy device and L/min  -TA      Prior Level of Function  independent:;all household mobility;community mobility  -SR independent:;all household mobility;gait;transfer;bed mobility;ADL's  -TA      Equipment Currently Used at Home  oxygen;walker, rolling  -SR oxygen;walker, rolling  -TA oxygen;walker, rolling  -SC     Plans/Goals Discussed With  patient;agreed upon  -SR patient;agreed upon  -TA      Risks Reviewed  patient:;LOB;increased discomfort;change in vital signs;lines disloged  -SR  patient:;LOB;dizziness;increased discomfort;change in vital signs  -TA      Benefits Reviewed  patient:;improve function  -SR patient:;improve function;increase independence;increase strength;increase balance;decrease pain;increase knowledge  -TA      Barriers to Rehab  medically complex;previous functional deficit  -SR none identified  -TA      Living Environment    Lives With   spouse  -TA facility resident;spouse   Cardinal Vilchis  -SC     Living Arrangements   house  -TA house  -SC     Home Accessibility   ramps present at home  -TA no concerns  -SC     Stair Railings at Home    none  -SC     Type of Financial/Environmental Concern    none  -SC     Transportation Available    family or friend will provide  -SC     Living Environment Comment  admitted from [facility] where she was getting rehab after recent dc from hospital, pt states her  is getting over having flu  -SR       Clinical Impression    Date of Referral to OT   01/04/18  -TA      OT Diagnosis   Impaired mobility and ADLs  -TA      Impairments Found (describe specific impairments)   aerobic capacity/endurance;gait, locomotion, and balance;muscle performance  -TA      Patient/Family Goals Statement   get stronger, return home  -TA      Criteria for Skilled Therapeutic Interventions Met   yes;treatment indicated  -TA      Rehab Potential   good, to achieve stated therapy goals  -TA      Therapy Frequency   daily   Per priority policy  -TA      Anticipated Discharge Disposition   inpatient rehabilitation facility  -TA      Vital Signs    Pre Systolic BP Rehab  141  -  -TA      Pre Treatment Diastolic BP  84  -SR 91  -TA      Post Systolic BP Rehab  157  -SR       Post Treatment Diastolic BP  91  -SR       Pretreatment Heart Rate (beats/min)  76  -SR 78  -TA      Intratreatment Heart Rate (beats/min)  130  -SR       Posttreatment Heart Rate (beats/min)  82  -  -TA      Pre SpO2 (%)  89  -SR 90  -TA      O2 Delivery Pre Treatment  supplemental  O2  -SR supplemental O2   4L  -TA      Intra SpO2 (%)  77  -SR 67   5L  -TA      O2 Delivery Intra Treatment  supplemental O2  -SR supplemental O2  -TA      Post SpO2 (%)  91  -SR 91  -TA      O2 Delivery Post Treatment  supplemental O2  -SR supplemental O2   5L  -TA      Recovery Time  1 minute  -SR       Pain Assessment    Pain Assessment  No/denies pain  -SR No/denies pain  -TA      Vision Assessment/Intervention    Visual Impairment   WFL  -TA      Cognitive Assessment/Intervention    Current Cognitive/Communication Assessment  functional  -SR functional  -TA      Orientation Status  oriented x 4  -SR oriented x 4  -TA      Follows Commands/Answers Questions  100% of the time  -% of the time;able to follow single-step instructions;needs cueing  -TA      Personal Safety  mild impairment;decreased insight to deficits  -SR mild impairment  -TA      Personal Safety Interventions  fall prevention program maintained;gait belt;nonskid shoes/slippers when out of bed  -SR fall prevention program maintained;gait belt;nonskid shoes/slippers when out of bed;supervised activity;muscle strengthening facilitated  -TA      ROM (Range of Motion)    General ROM  no range of motion deficits identified  -SR no range of motion deficits identified  -TA      General ROM Detail   BUE WFL  -TA      MMT (Manual Muscle Testing)    General MMT Assessment  lower extremity strength deficits identified  -SR upper extremity strength deficits identified  -TA      General MMT Assessment Detail  BLE grossly 3+/5  -SR BUE 4/5  -TA      Bed Mobility, Assessment/Treatment    Bed Mobility, Assistive Device  bed rails;head of bed elevated  -SR       Bed Mob, Supine to Sit, [place]  contact guard assist  -SR contact guard assist  -TA      Bed Mob, Sit to Supine, [place]   not tested  -TA      Transfer Assessment/Treatment    Transfers, Sit-Stand [place]  verbal cues required;contact guard assist  -SR contact guard assist;verbal  cues required  -TA      Transfers, Stand-Sit [place]  verbal cues required;contact guard assist  -SR contact guard assist;verbal cues required  -TA      Transfers, Sit-Stand-Sit, Assist Device  rolling walker  -SR rolling walker  -TA      Transfer, Safety Issues   step length decreased;weight-shifting ability decreased  -TA      Transfer, Impairments   strength decreased;impaired balance  -TA      Transfer, Comment  cues for hand placement  -SR VCs for safe hand placement  -TA      Functional Mobility    Functional Mobility- Ind. Level   contact guard assist;verbal cues required  -TA      Functional Mobility- Device   rolling walker  -TA      Functional Mobility-Distance (Feet)   --   Into hallway; see PT note for distance  -TA      Functional Mobility- Safety Issues   supplemental O2;step length decreased;weight-shifting ability decreased  -TA      Upper Body Dressing Assessment/Training    UB Dressing Assess/Train, Clothing Type   donning:;hospital gown  -TA      UB Dressing Assess/Train, Position   sitting  -TA      UB Dressing Assess/Train, [place]   contact guard assist;verbal cues required  -TA      UB Dressing Assess/Train, Impairments   strength decreased  -TA      Lower Body Dressing Assessment/Training    LB Dressing Assess/Train, Clothing Type   donning:;slipper socks  -TA      LB Dressing Assess/Train, Position   long sitting  -TA      LB Dressing Assess/Train, [place]   dependent (less than 25% patient effort)  -TA      LB Dressing Assess/Train, Impairments   decreased flexibility;strength decreased;impaired balance  -TA      Toileting Assessment/Training    Toileting Assess/Train, Comment   Pt denied need to void  -TA      Motor Skills/Interventions    Additional Documentation  Balance Skills Training (Group)  -SR Balance Skills Training (Group)  -TA      Balance Skills Training    Sitting-Level of Assistance Close supervision  -SR  Close supervision  -TA      Sitting-Balance Support  Feet supported  -SR  Feet supported  -TA      Sitting-Balance Activities   Forward lean;Trunk control activities  -TA      Standing-Level of Assistance Contact guard  -SR  Contact guard  -TA      Static Standing Balance Support assistive device  -SR  assistive device  -TA      Standing-Balance Activities   Weight Shift A-P;Weight Shift R-L;Reaching for objects  -TA      Gait Balance-Level of Assistance Contact guard;x2  -SR        Gait Balance Support assistive device  -SR        Therapy Exercises    Bilateral Upper Extremity   AROM:;5 reps;sitting;elbow flexion/extension;hand pumps;shoulder extension/flexion  -TA      Sensory Assessment/Intervention    Light Touch   LUE;RUE  -TA      LUE Light Touch   WNL  -TA      RUE Light Touch   WNL  -TA      General Therapy Interventions    Planned Therapy Interventions   activity intolerance;ADL retraining;balance training;energy conservation;home exercise program;strengthening;transfer training  -TA      Positioning and Restraints    Pre-Treatment Position in bed  -SR  in bed  -TA      Post Treatment Position chair  -SR  chair  -TA      In Chair notified nsg;reclined;sitting;call light within reach;encouraged to call for assist;exit alarm on;waffle cushion  -SR  reclined;call light within reach;encouraged to call for assist;exit alarm on;waffle cushion;legs elevated;heels elevated  -TA        01/05/18 1315                Rehab Evaluation    Evaluation Not Performed unable to evaluate, medical status change   Pt not appropriate for therapy this date d/t resp status, will check back tomorrow.   -CL          User Key  (r) = Recorded By, (t) = Taken By, (c) = Cosigned By    Initials Name Effective Dates    SR Arcelia Lozano, PT 06/19/15 -     LS Madie Courtney, PT 06/19/15 -     SC Casandra Ruff RN 05/02/16 -     TA Manolo Paul, OT 03/14/16 -     CL Barbara Herrera, OT 06/08/16 -            Occupational Therapy Education     Title: PT OT SLP Therapies (Active)     Topic:  Occupational Therapy (Active)     Point: ADL training (Done)    Description: Instruct learner(s) on proper safety adaptation and remediation techniques during self care or transfers.   Instruct in proper use of assistive devices.    Learning Progress Summary    Learner Readiness Method Response Comment Documented by Status   Patient Acceptance E,D VU,NR,DU Role of OT; education re adaptive breathing, pacing/EC; reinforced need for call for assist with OOB activities. TA 01/07/18 1400 Done               Point: Home exercise program (Done)    Description: Instruct learner(s) on appropriate technique for monitoring, assisting and/or progressing therapeutic exercises/activities.    Learning Progress Summary    Learner Readiness Method Response Comment Documented by Status   Patient Acceptance E,D VU,NR,DU Role of OT; education re adaptive breathing, pacing/EC; reinforced need for call for assist with OOB activities. TA 01/07/18 1400 Done               Point: Precautions (Done)    Description: Instruct learner(s) on prescribed precautions during self-care and functional transfers.    Learning Progress Summary    Learner Readiness Method Response Comment Documented by Status   Patient Acceptance E,D VU,NR,DU Role of OT; education re adaptive breathing, pacing/EC; reinforced need for call for assist with OOB activities. TA 01/07/18 1400 Done                      User Key     Initials Effective Dates Name Provider Type Discipline    TA 03/14/16 -  Manolo Paul OT Occupational Therapist OT                  OT Recommendation and Plan  Anticipated Discharge Disposition: inpatient rehabilitation facility  Planned Therapy Interventions: activity intolerance, ADL retraining, balance training, energy conservation, home exercise program, strengthening, transfer training  Therapy Frequency: daily (Per priority policy)  Plan of Care Review  Plan Of Care Reviewed With: patient  Outcome Summary/Follow up Plan: Pt limited by  respiratory status; desat to upper 60's with ambulation on 5L; returned to 90% in 1 minute; deficits in ADL performance, fxl mobility, occupational endurance; CGA for bed mobility and STS; CGA x 1+1 to ambulate into hallway/VCs for adaptive breathing; recommend IP rehab at discharge.          OT Goals       01/07/18 1401          Patient Education OT LTG    Patient Education OT LTG, Date Established 01/07/18  -TA      Patient Education OT LTG, Time to Achieve 1 wk  -TA      Patient Education OT LTG, Education Type HEP;adaptive breathing;energy conservation  -TA      Patient Education OT LTG, Education Understanding verbalizes understanding  -TA      Patient Education OT LTG Outcome goal ongoing  -TA      Toileting OT LTG    Toileting Goal OT LTG, Date Established 01/07/18  -TA      Toileting Goal OT LTG, Time to Achieve 1 wk  -TA      Toileting Goal OT LTG, [place] Level supervision required  -TA      Toileting Goal OT LTG, Outcome goal ongoing  -TA      LB Dressing OT LTG    LB Dressing Goal OT LTG, Date Established 01/07/18  -TA      LB Dressing Goal OT LTG, Time to Achieve 1 wk  -TA      LB Dressing Goal OT LTG, [place] Level minimum assist (75% patient effort);verbal cues required   don/doff socks  -TA      LB Dressing Goal OT LTG, Outcome goal ongoing  -TA        User Key  (r) = Recorded By, (t) = Taken By, (c) = Cosigned By    Initials Name Provider Type    DEBORAH Paul, OT Occupational Therapist                Outcome Measures       01/07/18 1305          How much help from another is currently needed...    Putting on and taking off regular lower body clothing? 2  -TA      Bathing (including washing, rinsing, and drying) 2  -TA      Toileting (which includes using toilet bed pan or urinal) 3  -TA      Putting on and taking off regular upper body clothing 3  -TA      Taking care of personal grooming (such as brushing teeth) 3  -TA      Eating meals 4  -TA      Score 17  -TA      Functional  Assessment    Outcome Measure Options AM-PAC 6 Clicks Daily Activity (OT)  -TA        User Key  (r) = Recorded By, (t) = Taken By, (c) = Cosigned By    Initials Name Provider Type    TA Manolo Paul OT Occupational Therapist          Time Calculation:   OT Start Time: 1305 (ttc 0 minutes)    Therapy Charges for Today     Code Description Service Date Service Provider Modifiers Qty    [id number] HC OT EVAL LOW COMPLEXITY 4 1/7/2018 Manolo Paul OT GO 1               Manolo Paul OT  1/7/2018

## 2018-01-08 ENCOUNTER — APPOINTMENT (OUTPATIENT)
Dept: GENERAL RADIOLOGY | Facility: HOSPITAL | Age: 73
End: 2018-01-08

## 2018-01-08 PROBLEM — I48.91 ATRIAL FIBRILLATION WITH RVR (HCC): Status: ACTIVE | Noted: 2018-01-08

## 2018-01-08 LAB
ALBUMIN SERPL-MCNC: 3.9 G/DL (ref 3.2–4.8)
ALBUMIN/GLOB SERPL: 1.4 G/DL (ref 1.5–2.5)
ALP SERPL-CCNC: 88 U/L (ref 25–100)
ALT SERPL W P-5'-P-CCNC: 52 U/L (ref 7–40)
ANION GAP SERPL CALCULATED.3IONS-SCNC: 9 MMOL/L (ref 3–11)
APTT PPP: 30.2 SECONDS (ref 45–60)
APTT PPP: 31.5 SECONDS (ref 45–60)
ARTERIAL PATENCY WRIST A: ABNORMAL
AST SERPL-CCNC: 43 U/L (ref 0–33)
ATMOSPHERIC PRESS: ABNORMAL MMHG
BASE EXCESS BLDA CALC-SCNC: 8.1 MMOL/L (ref 0–2)
BASOPHILS # BLD AUTO: 0 10*3/MM3 (ref 0–0.2)
BASOPHILS NFR BLD AUTO: 0 % (ref 0–1)
BDY SITE: ABNORMAL
BILIRUB SERPL-MCNC: 0.5 MG/DL (ref 0.3–1.2)
BNP SERPL-MCNC: 411 PG/ML (ref 0–100)
BUN BLD-MCNC: 16 MG/DL (ref 9–23)
BUN/CREAT SERPL: 22.9 (ref 7–25)
CALCIUM SPEC-SCNC: 9.2 MG/DL (ref 8.7–10.4)
CHLORIDE SERPL-SCNC: 95 MMOL/L (ref 99–109)
CO2 BLDA-SCNC: 34.3 MMOL/L (ref 22–33)
CO2 SERPL-SCNC: 30 MMOL/L (ref 20–31)
COHGB MFR BLD: 1.9 % (ref 0–2)
CREAT BLD-MCNC: 0.7 MG/DL (ref 0.6–1.3)
D DIMER PPP FEU-MCNC: 1.04 MG/L (FEU) (ref 0–0.5)
DEPRECATED RDW RBC AUTO: 49.3 FL (ref 37–54)
EOSINOPHIL # BLD AUTO: 0.03 10*3/MM3 (ref 0–0.3)
EOSINOPHIL NFR BLD AUTO: 0.4 % (ref 0–3)
ERYTHROCYTE [DISTWIDTH] IN BLOOD BY AUTOMATED COUNT: 13.8 % (ref 11.3–14.5)
GFR SERPL CREATININE-BSD FRML MDRD: 82 ML/MIN/1.73
GLOBULIN UR ELPH-MCNC: 2.7 GM/DL
GLUCOSE BLD-MCNC: 127 MG/DL (ref 70–100)
GLUCOSE BLDC GLUCOMTR-MCNC: 111 MG/DL (ref 70–130)
GLUCOSE BLDC GLUCOMTR-MCNC: 118 MG/DL (ref 70–130)
GLUCOSE BLDC GLUCOMTR-MCNC: 122 MG/DL (ref 70–130)
GLUCOSE BLDC GLUCOMTR-MCNC: 128 MG/DL (ref 70–130)
GLUCOSE BLDC GLUCOMTR-MCNC: 128 MG/DL (ref 70–130)
GLUCOSE BLDC GLUCOMTR-MCNC: 147 MG/DL (ref 70–130)
HCO3 BLDA-SCNC: 32.9 MMOL/L (ref 20–26)
HCT VFR BLD AUTO: 33.6 % (ref 34.5–44)
HCT VFR BLD CALC: 31 %
HGB BLD-MCNC: 10.8 G/DL (ref 11.5–15.5)
HGB BLDA-MCNC: 10.1 G/DL (ref 14–18)
HOROWITZ INDEX BLD+IHG-RTO: 21 %
IMM GRANULOCYTES # BLD: 0.03 10*3/MM3 (ref 0–0.03)
IMM GRANULOCYTES NFR BLD: 0.4 % (ref 0–0.6)
INR PPP: 0.9
LYMPHOCYTES # BLD AUTO: 0.46 10*3/MM3 (ref 0.6–4.8)
LYMPHOCYTES NFR BLD AUTO: 6.6 % (ref 24–44)
MAGNESIUM SERPL-MCNC: 2.2 MG/DL (ref 1.3–2.7)
MCH RBC QN AUTO: 31.4 PG (ref 27–31)
MCHC RBC AUTO-ENTMCNC: 32.1 G/DL (ref 32–36)
MCV RBC AUTO: 97.7 FL (ref 80–99)
METHGB BLD QL: 0.8 % (ref 0–1.5)
MODALITY: ABNORMAL
MONOCYTES # BLD AUTO: 0.28 10*3/MM3 (ref 0–1)
MONOCYTES NFR BLD AUTO: 4 % (ref 0–12)
NEUTROPHILS # BLD AUTO: 6.19 10*3/MM3 (ref 1.5–8.3)
NEUTROPHILS NFR BLD AUTO: 88.6 % (ref 41–71)
OXYHGB MFR BLDV: 94 % (ref 94–99)
PCO2 BLDA: 46.5 MM HG (ref 35–45)
PH BLDA: 7.46 PH UNITS (ref 7.35–7.45)
PLATELET # BLD AUTO: 223 10*3/MM3 (ref 150–450)
PMV BLD AUTO: 8.5 FL (ref 6–12)
PO2 BLDA: 79.8 MM HG (ref 83–108)
POTASSIUM BLD-SCNC: 3.9 MMOL/L (ref 3.5–5.5)
PROT SERPL-MCNC: 6.6 G/DL (ref 5.7–8.2)
PROTHROMBIN TIME: 9.8 SECONDS (ref 9.6–11.5)
RBC # BLD AUTO: 3.44 10*6/MM3 (ref 3.89–5.14)
SODIUM BLD-SCNC: 134 MMOL/L (ref 132–146)
TROPONIN I SERPL-MCNC: 0.6 NG/ML
TSH SERPL DL<=0.05 MIU/L-ACNC: 0.3 MIU/ML (ref 0.35–5.35)
WBC NRBC COR # BLD: 6.99 10*3/MM3 (ref 3.5–10.8)

## 2018-01-08 PROCEDURE — 80053 COMPREHEN METABOLIC PANEL: CPT | Performed by: FAMILY MEDICINE

## 2018-01-08 PROCEDURE — 82805 BLOOD GASES W/O2 SATURATION: CPT | Performed by: PHYSICIAN ASSISTANT

## 2018-01-08 PROCEDURE — 93005 ELECTROCARDIOGRAM TRACING: CPT | Performed by: INTERNAL MEDICINE

## 2018-01-08 PROCEDURE — 85025 COMPLETE CBC W/AUTO DIFF WBC: CPT | Performed by: FAMILY MEDICINE

## 2018-01-08 PROCEDURE — 84484 ASSAY OF TROPONIN QUANT: CPT | Performed by: FAMILY MEDICINE

## 2018-01-08 PROCEDURE — 94760 N-INVAS EAR/PLS OXIMETRY 1: CPT

## 2018-01-08 PROCEDURE — 84443 ASSAY THYROID STIM HORMONE: CPT | Performed by: FAMILY MEDICINE

## 2018-01-08 PROCEDURE — 83735 ASSAY OF MAGNESIUM: CPT | Performed by: FAMILY MEDICINE

## 2018-01-08 PROCEDURE — 99291 CRITICAL CARE FIRST HOUR: CPT | Performed by: FAMILY MEDICINE

## 2018-01-08 PROCEDURE — 85379 FIBRIN DEGRADATION QUANT: CPT | Performed by: FAMILY MEDICINE

## 2018-01-08 PROCEDURE — 85730 THROMBOPLASTIN TIME PARTIAL: CPT | Performed by: PHYSICIAN ASSISTANT

## 2018-01-08 PROCEDURE — 85730 THROMBOPLASTIN TIME PARTIAL: CPT | Performed by: FAMILY MEDICINE

## 2018-01-08 PROCEDURE — 25010000002 HYDROCORTISONE SODIUM SUCCINATE 100 MG RECONSTITUTED SOLUTION: Performed by: INTERNAL MEDICINE

## 2018-01-08 PROCEDURE — 94799 UNLISTED PULMONARY SVC/PX: CPT

## 2018-01-08 PROCEDURE — 85610 PROTHROMBIN TIME: CPT | Performed by: PHYSICIAN ASSISTANT

## 2018-01-08 PROCEDURE — 25010000002 HEPARIN (PORCINE) PER 1000 UNITS: Performed by: PHYSICIAN ASSISTANT

## 2018-01-08 PROCEDURE — 82962 GLUCOSE BLOOD TEST: CPT

## 2018-01-08 PROCEDURE — 93010 ELECTROCARDIOGRAM REPORT: CPT | Performed by: INTERNAL MEDICINE

## 2018-01-08 PROCEDURE — 83880 ASSAY OF NATRIURETIC PEPTIDE: CPT | Performed by: FAMILY MEDICINE

## 2018-01-08 PROCEDURE — 25010000002 ENOXAPARIN PER 10 MG: Performed by: NURSE PRACTITIONER

## 2018-01-08 PROCEDURE — 99232 SBSQ HOSP IP/OBS MODERATE 35: CPT | Performed by: NURSE PRACTITIONER

## 2018-01-08 PROCEDURE — 71045 X-RAY EXAM CHEST 1 VIEW: CPT

## 2018-01-08 PROCEDURE — 36600 WITHDRAWAL OF ARTERIAL BLOOD: CPT | Performed by: PHYSICIAN ASSISTANT

## 2018-01-08 PROCEDURE — 94640 AIRWAY INHALATION TREATMENT: CPT

## 2018-01-08 PROCEDURE — 94660 CPAP INITIATION&MGMT: CPT

## 2018-01-08 RX ORDER — HEPARIN SODIUM 1000 [USP'U]/ML
30 INJECTION, SOLUTION INTRAVENOUS; SUBCUTANEOUS AS NEEDED
Status: DISCONTINUED | OUTPATIENT
Start: 2018-01-08 | End: 2018-01-08 | Stop reason: SDUPTHER

## 2018-01-08 RX ORDER — METOPROLOL TARTRATE 5 MG/5ML
INJECTION INTRAVENOUS
Status: COMPLETED
Start: 2018-01-08 | End: 2018-01-08

## 2018-01-08 RX ORDER — HEPARIN SODIUM 1000 [USP'U]/ML
47.8 INJECTION, SOLUTION INTRAVENOUS; SUBCUTANEOUS ONCE
Status: COMPLETED | OUTPATIENT
Start: 2018-01-08 | End: 2018-01-08

## 2018-01-08 RX ORDER — HEPARIN SODIUM 1000 [USP'U]/ML
60 INJECTION, SOLUTION INTRAVENOUS; SUBCUTANEOUS AS NEEDED
Status: DISCONTINUED | OUTPATIENT
Start: 2018-01-08 | End: 2018-01-08 | Stop reason: SDUPTHER

## 2018-01-08 RX ORDER — DILTIAZEM HYDROCHLORIDE 5 MG/ML
10 INJECTION INTRAVENOUS ONCE
Status: DISCONTINUED | OUTPATIENT
Start: 2018-01-08 | End: 2018-01-08

## 2018-01-08 RX ORDER — DILTIAZEM HCL IN NACL,ISO-OSM 125 MG/125
5-15 PLASTIC BAG, INJECTION (ML) INTRAVENOUS
Status: DISCONTINUED | OUTPATIENT
Start: 2018-01-08 | End: 2018-01-11 | Stop reason: HOSPADM

## 2018-01-08 RX ADMIN — IPRATROPIUM BROMIDE AND ALBUTEROL SULFATE 3 ML: .5; 3 SOLUTION RESPIRATORY (INHALATION) at 15:56

## 2018-01-08 RX ADMIN — IPRATROPIUM BROMIDE AND ALBUTEROL SULFATE 3 ML: .5; 3 SOLUTION RESPIRATORY (INHALATION) at 12:10

## 2018-01-08 RX ADMIN — LOSARTAN POTASSIUM 50 MG: 50 TABLET, FILM COATED ORAL at 17:16

## 2018-01-08 RX ADMIN — BUSPIRONE HYDROCHLORIDE 10 MG: 10 TABLET ORAL at 15:00

## 2018-01-08 RX ADMIN — AZITHROMYCIN 250 MG: 250 TABLET, FILM COATED ORAL at 17:16

## 2018-01-08 RX ADMIN — METOPROLOL TARTRATE 25 MG: 25 TABLET ORAL at 08:20

## 2018-01-08 RX ADMIN — LORAZEPAM 1 MG: 1 TABLET ORAL at 14:24

## 2018-01-08 RX ADMIN — ACETAMINOPHEN 650 MG: 325 TABLET, FILM COATED ORAL at 23:27

## 2018-01-08 RX ADMIN — BUSPIRONE HYDROCHLORIDE 10 MG: 10 TABLET ORAL at 08:20

## 2018-01-08 RX ADMIN — HEPARIN SODIUM 11.9 UNITS/KG/HR: 10000 INJECTION, SOLUTION INTRAVENOUS at 23:09

## 2018-01-08 RX ADMIN — Medication 250 MG: at 14:04

## 2018-01-08 RX ADMIN — FUROSEMIDE 40 MG: 40 TABLET ORAL at 08:20

## 2018-01-08 RX ADMIN — ALUMINUM HYDROXIDE, MAGNESIUM HYDROXIDE, AND DIMETHICONE 30 ML: 400; 400; 40 SUSPENSION ORAL at 17:16

## 2018-01-08 RX ADMIN — MONTELUKAST SODIUM 10 MG: 10 TABLET, FILM COATED ORAL at 20:55

## 2018-01-08 RX ADMIN — HYDROCORTISONE SODIUM SUCCINATE 50 MG: 100 INJECTION, POWDER, FOR SOLUTION INTRAMUSCULAR; INTRAVENOUS at 01:44

## 2018-01-08 RX ADMIN — METOPROLOL TARTRATE 2.5 MG: 5 INJECTION, SOLUTION INTRAVENOUS at 21:51

## 2018-01-08 RX ADMIN — Medication 250 MG: at 17:16

## 2018-01-08 RX ADMIN — ASPIRIN 81 MG: 81 TABLET ORAL at 08:20

## 2018-01-08 RX ADMIN — LEVOTHYROXINE SODIUM 50 MCG: 50 TABLET ORAL at 08:20

## 2018-01-08 RX ADMIN — IPRATROPIUM BROMIDE AND ALBUTEROL SULFATE 3 ML: .5; 3 SOLUTION RESPIRATORY (INHALATION) at 19:44

## 2018-01-08 RX ADMIN — LORAZEPAM 1 MG: 1 TABLET ORAL at 08:20

## 2018-01-08 RX ADMIN — DILTIAZEM HCL-SODIUM CHLORIDE IV SOLN 125 MG/125ML-0.9% 5 MG/HR: 125-0.9/125 SOLUTION at 23:18

## 2018-01-08 RX ADMIN — GABAPENTIN 600 MG: 300 CAPSULE ORAL at 15:00

## 2018-01-08 RX ADMIN — HYDROCORTISONE SODIUM SUCCINATE 50 MG: 100 INJECTION, POWDER, FOR SOLUTION INTRAMUSCULAR; INTRAVENOUS at 14:03

## 2018-01-08 RX ADMIN — IPRATROPIUM BROMIDE AND ALBUTEROL SULFATE 3 ML: .5; 3 SOLUTION RESPIRATORY (INHALATION) at 07:39

## 2018-01-08 RX ADMIN — ISOSORBIDE MONONITRATE 60 MG: 60 TABLET, EXTENDED RELEASE ORAL at 08:20

## 2018-01-08 RX ADMIN — BUSPIRONE HYDROCHLORIDE 10 MG: 10 TABLET ORAL at 20:55

## 2018-01-08 RX ADMIN — ACETAMINOPHEN 650 MG: 325 TABLET, FILM COATED ORAL at 02:20

## 2018-01-08 RX ADMIN — ENOXAPARIN SODIUM 40 MG: 40 INJECTION SUBCUTANEOUS at 14:04

## 2018-01-08 RX ADMIN — METOPROLOL TARTRATE 25 MG: 25 TABLET ORAL at 20:55

## 2018-01-08 RX ADMIN — HEPARIN SODIUM 4000 UNITS: 1000 INJECTION, SOLUTION INTRAVENOUS; SUBCUTANEOUS at 23:09

## 2018-01-08 RX ADMIN — HYDROCORTISONE SODIUM SUCCINATE 50 MG: 100 INJECTION, POWDER, FOR SOLUTION INTRAMUSCULAR; INTRAVENOUS at 23:27

## 2018-01-08 RX ADMIN — LORAZEPAM 1 MG: 1 TABLET ORAL at 20:55

## 2018-01-08 RX ADMIN — PANTOPRAZOLE SODIUM 40 MG: 40 TABLET, DELAYED RELEASE ORAL at 08:20

## 2018-01-08 RX ADMIN — LOSARTAN POTASSIUM 50 MG: 50 TABLET, FILM COATED ORAL at 05:40

## 2018-01-08 RX ADMIN — GABAPENTIN 600 MG: 300 CAPSULE ORAL at 20:55

## 2018-01-08 RX ADMIN — AMOXICILLIN AND CLAVULANATE POTASSIUM 1 TABLET: 875; 125 TABLET, FILM COATED ORAL at 20:55

## 2018-01-08 RX ADMIN — ATORVASTATIN CALCIUM 10 MG: 10 TABLET, FILM COATED ORAL at 08:20

## 2018-01-08 RX ADMIN — GABAPENTIN 600 MG: 300 CAPSULE ORAL at 08:20

## 2018-01-08 RX ADMIN — AMOXICILLIN AND CLAVULANATE POTASSIUM 1 TABLET: 875; 125 TABLET, FILM COATED ORAL at 08:20

## 2018-01-08 NOTE — PLAN OF CARE
Problem: Patient Care Overview (Adult)  Goal: Plan of Care Review  Outcome: Ongoing (interventions implemented as appropriate)   01/06/18 1636 01/07/18 2000 01/08/18 0645   Coping/Psychosocial Response Interventions   Plan Of Care Reviewed With --  patient --    Patient Care Overview   Progress improving --  --    Outcome Evaluation   Outcome Summary/Follow up Plan --  --  VSS. pt's blood pressure has still been elevated but has been lower than previous nights. pt tolerated introduction of metoprolol last night. pt is still very anxious at times. no other complaints this evening. will continue to monitor.       Problem: Respiratory Insufficiency (Adult)  Goal: Identify Related Risk Factors and Signs and Symptoms  Outcome: Ongoing (interventions implemented as appropriate)   01/06/18 0623   Respiratory Insufficiency   Related Risk Factors (Respiratory Insufficiency) activity intolerance;bedrest   Signs and Symptoms (Respiratory Insufficiency) abnormal breath sounds;decreased oxygen saturation     Goal: Acid/Base Balance  Outcome: Ongoing (interventions implemented as appropriate)   01/08/18 0645   Respiratory Insufficiency (Adult)   Acid/Base Balance making progress toward outcome     Goal: Effective Ventilation  Outcome: Ongoing (interventions implemented as appropriate)   01/08/18 0645   Respiratory Insufficiency (Adult)   Effective Ventilation making progress toward outcome

## 2018-01-08 NOTE — PROGRESS NOTES
"  [facility]   Inpatient Progress Note       LOS: 4 days   Patient Care Team:  Nick Mcginnis MD as PCP - General (Family Medicine)    Chief Complaint:  Follow-up for elevated troponin    Subjective     Interval History:   Patient in bed. No chest pain. Has some dyspnea. Not too far from her baseline.      Review of Systems:   Pertinent positives noted in history, exam, and assessment. Otherwise reviewed and negative.      Objective     Vitals:  Blood pressure 166/96, pulse 64, temperature 98.3 °F (36.8 °C), temperature source Axillary, resp. rate 16, height 152.4 cm (60\"), weight 83.7 kg (184 lb 9.6 oz), SpO2 93 %.     Intake/Output Summary (Last 24 hours) at 01/08/18 0816  Last data filed at 01/08/18 0540   Gross per 24 hour   Intake                0 ml   Output              557 ml   Net             -557 ml     Physical Exam   Constitutional: She is oriented to person, place, and time. She appears well-developed and well-nourished.   Neck: No JVD present. Carotid bruit is not present.   Cardiovascular: Normal rate, regular rhythm, S1 normal, S2 normal and normal heart sounds.  Exam reveals no gallop, no S3 and no S4.    No murmur heard.  Pulmonary/Chest: Effort normal. No respiratory distress.   Decreased bases   Abdominal: Soft. Bowel sounds are normal. There is no tenderness.   Musculoskeletal: She exhibits no edema.   Neurological: She is alert and oriented to person, place, and time.   Skin: Skin is warm and dry.          Results Review:     I reviewed the patient's new clinical results.      Results from last 7 days  Lab Units 01/07/18  0814   WBC 10*3/mm3 7.57   HEMOGLOBIN g/dL 10.1*   HEMATOCRIT % 31.1*   PLATELETS 10*3/mm3 212       Results from last 7 days  Lab Units 01/07/18  0814   SODIUM mmol/L 134   POTASSIUM mmol/L 4.1   CHLORIDE mmol/L 96*   CO2 mmol/L 32.0*   BUN mg/dL 14   CREATININE mg/dL 0.70   CALCIUM mg/dL 8.9   BILIRUBIN mg/dL 0.6   ALK PHOS U/L 76   ALT " (SGPT) U/L 34   AST (SGOT) U/L 30   GLUCOSE mg/dL 116*       Results from last 7 days  Lab Units 01/07/18  0814   SODIUM mmol/L 134   POTASSIUM mmol/L 4.1   CHLORIDE mmol/L 96*   CO2 mmol/L 32.0*   BUN mg/dL 14   CREATININE mg/dL 0.70   GLUCOSE mg/dL 116*   CALCIUM mg/dL 8.9           Lab Results  Lab Value Date/Time   TROPONINI 1.443 (C) 01/06/2018 0353   TROPONINI 4.637 (C) 01/05/2018 0646   TROPONINI 0.428 (H) 01/04/2018 2028   TROPONINI 0.200 (H) 01/04/2018 1440                     Tele:  SR    Assessment/Plan     Principal Problem:    Sepsis  Active Problems:    Hypertension    Hypothyroidism    Dependence on supplemental oxygen    Secondary pulmonary arterial hypertension due to COPD/ELIZABETH    Severe COPD on home O2 baseline 4 to 6 Liters    Depression with anxiety    Chronic diastolic congestive heart failure    GERD (gastroesophageal reflux disease)    HLD (hyperlipidemia)    Mixed Respiratory failure, acute-on-chronic    Elevated troponin    Syncope and collapse on admission due to hypotension of sepsis    Pneumonia      1. Positive troponin   - Normal left ventricular systolic function by echo last month.    - Suspect this is due to her recent illness and not true myocardial ischemia.   - medical therapy  2. Syncope,   - suspect vasovagal and related to her sepsis  3. Sepsis/PNA  - per attending MD  4. Severe/end-stage COPD with chronic 4 L of oxygen at home  5. Hypertension   - home meds in process of being resumed.  6. History of diastolic heart failure, currently appears euvolemic    Plan:  Patient at this time is overall stable. No need for further cardiac evaluation at this time as she is pain free. Would simply treat medically. Most likely her troponin leak was related to her sepsis and associated hypotension. We will sign off. Please call if any further questions.    Marina Truong, APRN  01/08/18  8:16 AM        Dictated utilizing Dragon dictation

## 2018-01-08 NOTE — PROGRESS NOTES
[facility] Medicine Services  PROGRESS NOTE    Patient Name: Virgie Arroyo  : 1945  MRN: [id number]    Date of Admission: 2018  Length of Stay: 4  Primary Care Physician: Nick Mcginnis MD    Subjective   Subjective     CC:  Sepsis, pneumonia    HPI:  Late note entry saw patient at 1030. She was sitting up in bed in NAD. She states she feels good today. She had problems tolerating cpap last night. Walked with PT. No complaints at this time.     Review of Systems  Gen- No fevers, chills  CV- No chest pain, palpitations  Resp- (+) cough,  Mild HITCHCOCK  GI- No N/V/D, abd pain    Otherwise ROS is negative except as mentioned in the HPI.    Objective   Objective     Vital Signs:   Temp:  [97.7 °F (36.5 °C)-98.3 °F (36.8 °C)] 97.7 °F (36.5 °C)  Heart Rate:  [64-82] 69  Resp:  [14-20] 20  BP: (152-168)/() 155/88        Physical Exam:  Constitutional: No acute distress, awake, alert  HENT: NCAT, mucous membranes moist  Respiratory: rales in bilateral bases, dull in left base and otherwise clear, respiratory effort normal   Cardiovascular: RRR  Gastrointestinal: Positive bowel sounds, soft, nontender, nondistended  Musculoskeletal: No bilateral ankle edema  Psychiatric: Appropriate affect, cooperative  Neurologic: Oriented x 3, movements symmetric in all extremities, Cranial Nerves grossly intact to confrontation, speech clear  Skin: No rashes, pale       Results Reviewed:  I have personally reviewed current lab, radiology, and data and agree.      Results from last 7 days  Lab Units 18  0814 18  0353 18  0430   WBC 10*3/mm3 7.57 10.14 12.24*   HEMOGLOBIN g/dL 10.1* 9.8* 11.0*   HEMATOCRIT % 31.1* 30.4* 34.0*   PLATELETS 10*3/mm3 212 226 175       Results from last 7 days  Lab Units 18  0814 18  0353 18  0646 18  2028   SODIUM mmol/L 134 133 133 131*   POTASSIUM mmol/L 4.1 3.7 4.5 4.7   CHLORIDE mmol/L 96* 96* 93* 92*   CO2 mmol/L  32.0* 31.0 31.0 32.0*   BUN mg/dL 14 17 20 20   CREATININE mg/dL 0.70 0.60 0.80 1.20   GLUCOSE mg/dL 116* 122* 107* 123*   CALCIUM mg/dL 8.9 8.5* 8.2* 8.3*   ALT (SGPT) U/L 34  --  32 31   AST (SGOT) U/L 30  --  31 20   TROPONIN I ng/mL  --  1.443* 4.637* 0.428*     No results found for: BNP  pH, Arterial   Date Value Ref Range Status   01/06/2018 7.482 (H) 7.350 - 7.450 pH units Final       Microbiology Results Abnormal     Procedure Component Value - Date/Time    Blood Culture - Blood, [[id number]]  (Normal) Collected:  01/04/18 1440    Lab Status:  Preliminary result Specimen:  Blood from Arm, Right Updated:  01/07/18 1516     Blood Culture No growth at 3 days    Blood Culture - Blood, [[id number]]  (Normal) Collected:  01/04/18 1445    Lab Status:  Preliminary result Specimen:  Blood from Hand, Left Updated:  01/07/18 1516     Blood Culture No growth at 3 days    Respiratory Panel, PCR - Swab, Nasopharynx [[id number]] Collected:  01/05/18 0609    Lab Status:  Final result Specimen:  Swab from Nasopharynx Updated:  01/07/18 0640     Adenovirus Detection by PCR Not Detected     Coronavirus HKU1 Not Detected     Coronavirus NL63 Not Detected     Coronavirus 229E Not Detected     Coronavirus OC43 Not Detected     Human Metapneumovirus Not Detected     Human Rhinovirus/Enterovirus Not Detected     Influenza A PCR Not Detected     Influenza A H1 Not Detected     Influenza 2009 H1N1 by PCR Not Detected     Influenza A H3 Not Detected     Influenza B PCR Not Detected     Parainfluenza Virus 1 Not Detected     Parainfluenza Virus 2 Not Detected     Parainfluenza Virus 3 Not Detected     Parainfluenza Virus 4 Not Detected     Respiratory Syncytial Virus Not Detected     Bordetella pertussis pcr Not Detected     Chlamydophila pneumoniae PCR Not Detected     Mycoplasma pneumo by PCR Not Detected    Narrative:       Performed at:  [facility]  [id number]  : Jose Alfredo DICKEY  Glo JULES, Phone:  [phone]    Influenza A & B, RT PCR - Swab, Nasopharynx [[id number]]  (Normal) Collected:  01/06/18 1627    Lab Status:  Final result Specimen:  Swab from Nasopharynx Updated:  01/06/18 1736     Influenza A PCR Not Detected     Influenza B PCR Not Detected    Influenza A & B, RT PCR - Swab, Nasopharynx [[id number]]  (Normal) Collected:  01/04/18 1401    Lab Status:  Final result Specimen:  Swab from Nasopharynx Updated:  01/04/18 1503     Influenza A PCR Not Detected     Influenza B PCR Not Detected          Imaging Results (last 24 hours)     ** No results found for the last 24 hours. **        Results for orders placed during the hospital encounter of 12/20/17   Adult Transthoracic Echo Complete W/ Cont if Necessary Per Protocol    Narrative · Left ventricular systolic function is normal.  · Estimated EF appears to be in the range of 66 - 70%.  · Left ventricular diastolic dysfunction (grade I) consistent with   impaired relaxation.  · Left ventricular wall thickness is consistent with mild concentric   hypertrophy.  · Mild aortic valve regurgitation is present.  · Mild to moderate tricuspid valve regurgitation is present.  · Estimated right ventricular systolic pressure from tricuspid   regurgitation is moderately elevated (45-55 mmHg).          I have reviewed the medications.    Assessment/Plan   Assessment / Plan     Hospital Problem List     * (Principal)Sepsis    Overview Signed 1/7/2018  9:19 AM by Kika Valle MD     Fever, hypotension, WBC>12, RML/RLL pneumonia         Hypertension    Hypothyroidism    Dependence on supplemental oxygen    Overview Signed 8/5/2016 10:21 AM by Jake Mtz     · A.  4 L at rest and up to 6 with exertion.            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 female w/ h/o HTN, HLD, Diastolic HF, COPD on home O2, ELIZABETH on CPAP, recent admission to [facility] 12/20 to 12/27 for AECOPD, was transferred to [facility].  Brought to [facility] ED on 1/4/2018 after witnessed syncopal event.  There was report of a fever to 101.6 and in the ED developed sudden hypotension, worsening hypoxemia and lethargy and was found unresponsive in bed.  He was admitted to the ICU and improved with fluid resuscitation as well as BiPap.  Initial chest x-ray was indeterminant but after appropriate hydration repeat showed obvious RML/RLL pneumonia:    Assessment & Plan:  - New to floor from ICU, patient is now nontoxic and markedly improved  - BPs are returning to her hypertensive baseline and likely now can begin to add back her outpatient antihypertensives since sepsis is resolved.  In ICU her home Lasix had been reinitiated, also her home Cozaar but at a decreased dose which we will leave for now. Restart Metoprolol and  Imdur with morning meds if BP's alllow.  - Will transition antibiotics to by mouth equivalents for remaining course  - Alert CM that patient is appropriate for return to [facility], they can begin recertification process    DVT Prophylaxis:  Lovenox    CODE STATUS: Full Code    Disposition: I expect the patient to be discharged to [facility] once bed available    IRVING Rm  01/08/18  12:49 PM

## 2018-01-08 NOTE — PROGRESS NOTES
Continued Stay Note  [facility]     Patient Name: Virgie Arroyo  MRN: [id number]  Today's Date: 1/8/2018    Admit Date: 1/4/2018          Discharge Plan       01/08/18 1304    Case Management/Social Work Plan    Additional Comments Spoke with Shaunna with [facility], she states she is submitting clinicals to [facility] for precert and we should hear something this afternoon or more likely tomorrow 1/9/2018.  CM will cont to follow. #[id number]              Discharge Codes     None        Expected Discharge Date and Time     Expected Discharge Date Expected Discharge Time    Jan 9, 2018             Chaparrita Chambers RN

## 2018-01-09 LAB
APTT PPP: 44.7 SECONDS (ref 45–60)
APTT PPP: 56.5 SECONDS (ref 45–60)
APTT PPP: 83.3 SECONDS (ref 45–60)
BACTERIA SPEC AEROBE CULT: NORMAL
BACTERIA SPEC AEROBE CULT: NORMAL
CA-I SERPL ISE-MCNC: 1.27 MMOL/L (ref 1.12–1.32)
GLUCOSE BLDC GLUCOMTR-MCNC: 111 MG/DL (ref 70–130)
GLUCOSE BLDC GLUCOMTR-MCNC: 121 MG/DL (ref 70–130)
GLUCOSE BLDC GLUCOMTR-MCNC: 125 MG/DL (ref 70–130)
GLUCOSE BLDC GLUCOMTR-MCNC: 89 MG/DL (ref 70–130)
MAGNESIUM SERPL-MCNC: 2.2 MG/DL (ref 1.3–2.7)
POTASSIUM BLD-SCNC: 3.8 MMOL/L (ref 3.5–5.5)
T4 FREE SERPL-MCNC: 1.25 NG/DL (ref 0.89–1.76)
TROPONIN I SERPL-MCNC: 0.43 NG/ML
TROPONIN I SERPL-MCNC: 0.54 NG/ML
TROPONIN I SERPL-MCNC: 0.54 NG/ML
TROPONIN I SERPL-MCNC: 0.58 NG/ML
TROPONIN I SERPL-MCNC: 0.61 NG/ML
TSH SERPL DL<=0.05 MIU/L-ACNC: 0.16 MIU/ML (ref 0.35–5.35)

## 2018-01-09 PROCEDURE — 85730 THROMBOPLASTIN TIME PARTIAL: CPT

## 2018-01-09 PROCEDURE — 93010 ELECTROCARDIOGRAM REPORT: CPT | Performed by: INTERNAL MEDICINE

## 2018-01-09 PROCEDURE — 99232 SBSQ HOSP IP/OBS MODERATE 35: CPT | Performed by: NURSE PRACTITIONER

## 2018-01-09 PROCEDURE — 94660 CPAP INITIATION&MGMT: CPT

## 2018-01-09 PROCEDURE — 25010000002 HYDROCORTISONE SODIUM SUCCINATE 100 MG RECONSTITUTED SOLUTION: Performed by: INTERNAL MEDICINE

## 2018-01-09 PROCEDURE — 84484 ASSAY OF TROPONIN QUANT: CPT | Performed by: FAMILY MEDICINE

## 2018-01-09 PROCEDURE — 94799 UNLISTED PULMONARY SVC/PX: CPT

## 2018-01-09 PROCEDURE — 93005 ELECTROCARDIOGRAM TRACING: CPT | Performed by: FAMILY MEDICINE

## 2018-01-09 PROCEDURE — 85730 THROMBOPLASTIN TIME PARTIAL: CPT | Performed by: PHYSICIAN ASSISTANT

## 2018-01-09 PROCEDURE — 97110 THERAPEUTIC EXERCISES: CPT | Performed by: PHYSICAL THERAPIST

## 2018-01-09 PROCEDURE — 94760 N-INVAS EAR/PLS OXIMETRY 1: CPT

## 2018-01-09 PROCEDURE — 97116 GAIT TRAINING THERAPY: CPT | Performed by: PHYSICAL THERAPIST

## 2018-01-09 PROCEDURE — 94640 AIRWAY INHALATION TREATMENT: CPT

## 2018-01-09 PROCEDURE — 82962 GLUCOSE BLOOD TEST: CPT

## 2018-01-09 PROCEDURE — 84439 ASSAY OF FREE THYROXINE: CPT | Performed by: NURSE PRACTITIONER

## 2018-01-09 PROCEDURE — 82330 ASSAY OF CALCIUM: CPT | Performed by: NURSE PRACTITIONER

## 2018-01-09 PROCEDURE — 84443 ASSAY THYROID STIM HORMONE: CPT | Performed by: PHYSICIAN ASSISTANT

## 2018-01-09 PROCEDURE — 84132 ASSAY OF SERUM POTASSIUM: CPT | Performed by: NURSE PRACTITIONER

## 2018-01-09 PROCEDURE — 25010000002 HEPARIN (PORCINE) PER 1000 UNITS

## 2018-01-09 PROCEDURE — 83735 ASSAY OF MAGNESIUM: CPT | Performed by: NURSE PRACTITIONER

## 2018-01-09 RX ORDER — LEVOTHYROXINE SODIUM 0.05 MG/1
50 TABLET ORAL
Status: DISCONTINUED | OUTPATIENT
Start: 2018-01-10 | End: 2018-01-11 | Stop reason: HOSPADM

## 2018-01-09 RX ADMIN — ATORVASTATIN CALCIUM 10 MG: 10 TABLET, FILM COATED ORAL at 08:50

## 2018-01-09 RX ADMIN — MONTELUKAST SODIUM 10 MG: 10 TABLET, FILM COATED ORAL at 20:30

## 2018-01-09 RX ADMIN — AMOXICILLIN AND CLAVULANATE POTASSIUM 1 TABLET: 875; 125 TABLET, FILM COATED ORAL at 08:50

## 2018-01-09 RX ADMIN — LORAZEPAM 1 MG: 1 TABLET ORAL at 21:52

## 2018-01-09 RX ADMIN — BUSPIRONE HYDROCHLORIDE 10 MG: 10 TABLET ORAL at 20:29

## 2018-01-09 RX ADMIN — PANTOPRAZOLE SODIUM 40 MG: 40 TABLET, DELAYED RELEASE ORAL at 08:54

## 2018-01-09 RX ADMIN — AMOXICILLIN AND CLAVULANATE POTASSIUM 1 TABLET: 875; 125 TABLET, FILM COATED ORAL at 20:29

## 2018-01-09 RX ADMIN — HYDROCORTISONE SODIUM SUCCINATE 50 MG: 100 INJECTION, POWDER, FOR SOLUTION INTRAMUSCULAR; INTRAVENOUS at 12:20

## 2018-01-09 RX ADMIN — HYDROCORTISONE SODIUM SUCCINATE 50 MG: 100 INJECTION, POWDER, FOR SOLUTION INTRAMUSCULAR; INTRAVENOUS at 23:29

## 2018-01-09 RX ADMIN — GABAPENTIN 600 MG: 300 CAPSULE ORAL at 17:14

## 2018-01-09 RX ADMIN — IPRATROPIUM BROMIDE AND ALBUTEROL SULFATE 3 ML: .5; 3 SOLUTION RESPIRATORY (INHALATION) at 12:47

## 2018-01-09 RX ADMIN — LORAZEPAM 1 MG: 1 TABLET ORAL at 15:20

## 2018-01-09 RX ADMIN — HEPARIN SODIUM 11 UNITS/KG/HR: 10000 INJECTION, SOLUTION INTRAVENOUS at 21:52

## 2018-01-09 RX ADMIN — METOPROLOL TARTRATE 25 MG: 25 TABLET ORAL at 08:50

## 2018-01-09 RX ADMIN — GABAPENTIN 600 MG: 300 CAPSULE ORAL at 20:30

## 2018-01-09 RX ADMIN — ASPIRIN 81 MG: 81 TABLET ORAL at 08:50

## 2018-01-09 RX ADMIN — Medication 250 MG: at 17:14

## 2018-01-09 RX ADMIN — ISOSORBIDE MONONITRATE 60 MG: 60 TABLET, EXTENDED RELEASE ORAL at 08:50

## 2018-01-09 RX ADMIN — LORAZEPAM 1 MG: 1 TABLET ORAL at 08:54

## 2018-01-09 RX ADMIN — BUSPIRONE HYDROCHLORIDE 10 MG: 10 TABLET ORAL at 17:14

## 2018-01-09 RX ADMIN — AZITHROMYCIN 250 MG: 250 TABLET, FILM COATED ORAL at 17:14

## 2018-01-09 RX ADMIN — Medication 250 MG: at 12:21

## 2018-01-09 RX ADMIN — LOSARTAN POTASSIUM 50 MG: 50 TABLET, FILM COATED ORAL at 05:48

## 2018-01-09 RX ADMIN — GABAPENTIN 600 MG: 300 CAPSULE ORAL at 08:50

## 2018-01-09 RX ADMIN — IPRATROPIUM BROMIDE AND ALBUTEROL SULFATE 3 ML: .5; 3 SOLUTION RESPIRATORY (INHALATION) at 20:33

## 2018-01-09 RX ADMIN — IPRATROPIUM BROMIDE AND ALBUTEROL SULFATE 3 ML: .5; 3 SOLUTION RESPIRATORY (INHALATION) at 07:43

## 2018-01-09 RX ADMIN — LOSARTAN POTASSIUM 50 MG: 50 TABLET, FILM COATED ORAL at 17:14

## 2018-01-09 RX ADMIN — BUSPIRONE HYDROCHLORIDE 10 MG: 10 TABLET ORAL at 08:50

## 2018-01-09 RX ADMIN — DILTIAZEM HCL-SODIUM CHLORIDE IV SOLN 125 MG/125ML-0.9% 5 MG/HR: 125-0.9/125 SOLUTION at 21:52

## 2018-01-09 RX ADMIN — METOPROLOL TARTRATE 25 MG: 25 TABLET ORAL at 20:30

## 2018-01-09 RX ADMIN — IPRATROPIUM BROMIDE AND ALBUTEROL SULFATE 3 ML: .5; 3 SOLUTION RESPIRATORY (INHALATION) at 16:53

## 2018-01-09 RX ADMIN — FUROSEMIDE 40 MG: 40 TABLET ORAL at 08:50

## 2018-01-09 NOTE — PROGRESS NOTES
Continued Stay Note  [facility]     Patient Name: Virgie Arroyo  MRN: [id number]  Today's Date: 1/9/2018    Admit Date: 1/4/2018          Discharge Plan     Consent obtained for the participation in the [facility] Transitions Program. Rhina Nava RN                Discharge Codes     None        Expected Discharge Date and Time     Expected Discharge Date Expected Discharge Time    Jan 9, 2018             Rhina Nava RN

## 2018-01-09 NOTE — PLAN OF CARE
Problem: Patient Care Overview (Adult)  Goal: Plan of Care Review  Outcome: Ongoing (interventions implemented as appropriate)   01/09/18 0954   Coping/Psychosocial Response Interventions   Plan Of Care Reviewed With patient   Outcome Evaluation   Outcome Summary/Follow up Plan Pt progressing towards skilled PT goals. Pt transferred sup-->sit with SBA, stood with CGA, and ambulated 60 feet with CGA using rw. O2 dropped to 78% on 4L s/p amb - took ~3 minutes of sitting and PLB to recover to 90%. Pt will continue to benefit from skilled PT to improve mobility and safety prior to d/c.       Problem: Inpatient Physical Therapy  Goal: Bed Mobility Goal STG- PT  Outcome: Ongoing (interventions implemented as appropriate)   01/07/18 1405 01/09/18 0954   Bed Mobility PT STG   Bed Mobility PT STG, Date Established 01/07/18 --    Bed Mobility PT STG, Time to Achieve 5 days --    Bed Mobility PT STG, Activity Type all bed mobility --    Bed Mobility PT STG, [place] independent --    Bed Mobility PT STG, Outcome --  goal ongoing     Goal: Transfer Training Goal 1 LTG- PT  Outcome: Ongoing (interventions implemented as appropriate)   01/07/18 1405 01/09/18 0954   Transfer Training PT LTG   Transfer Training PT LTG, Date Established 01/07/18 --    Transfer Training PT LTG, Time to Achieve by discharge --    Transfer Training PT LTG, Activity Type all transfers --    Transfer Training PT LTG, [place] Level conditional independence --    Transfer Training PT LTG, Assist Device walker, rolling --    Transfer Training PT LTG, Outcome --  goal ongoing     Goal: Gait Training Goal LTG- PT  Outcome: Ongoing (interventions implemented as appropriate)   01/07/18 1405 01/09/18 0954   Gait Training PT LTG   Gait Training Goal PT LTG, Date Established 01/07/18 --    Gait Training Goal PT LTG, Time to Achieve by discharge --    Gait Training Goal PT LTG, [place] Level conditional independence --    Gait Training Goal PT  LTG, Assist Device walker, rolling --    Gait Training Goal PT LTG, Distance to Achieve 100 --    Gait Training Goal PT LTG, Outcome --  goal ongoing     Goal: Cardiopulmonary Goal LTG- PT  Outcome: Ongoing (interventions implemented as appropriate)   01/07/18 1405 01/09/18 0954   Cardiopulmonary PT LTG   Cardiopulmonary PT LTG, Date Established 01/07/18 --    Cardiopulmonary PT LTG, Time to Achieve by discharge --    Cardiopulmonary PT LTG, Additional Goal Pt able to maintain O2 sats at or above 85% during mobility. --    Cardiopulmonary PT LTG, Outcome --  goal ongoing

## 2018-01-09 NOTE — PLAN OF CARE
Problem: Patient Care Overview (Adult)  Goal: Plan of Care Review  Outcome: Ongoing (interventions implemented as appropriate)   01/09/18 0532   Coping/Psychosocial Response Interventions   Plan Of Care Reviewed With patient   Patient Care Overview   Progress no change   Outcome Evaluation   Outcome Summary/Follow up Plan RRT called this evening for chest pain 5/10 and afib RVR which pt has no history of. cardizem gtt and heparin gtt started and cardiology consulted again. will see this AM. after episode, pt has been on BiPAP/6L NC. no other complaints of pain. pt stil has very bad anxiety after taking PRN ativan. will continue to monitor.        Problem: Anxiety (Adult)  Goal: Identify Related Risk Factors and Signs and Symptoms  Outcome: Ongoing (interventions implemented as appropriate)   01/06/18 0623   Anxiety   Related Risk Factors (Anxiety) knowledge deficit;environment change;health status change   Signs and Symptoms (Anxiety) apprehension/being worried     Goal: Reduction/Resolution  Outcome: Ongoing (interventions implemented as appropriate)   01/09/18 0532   Anxiety (Adult)   Reduction/Resolution making progress toward outcome

## 2018-01-09 NOTE — PLAN OF CARE
Problem: Patient Care Overview (Adult)  Goal: Plan of Care Review  Outcome: Ongoing (interventions implemented as appropriate)   01/09/18 6461   Coping/Psychosocial Response Interventions   Plan Of Care Reviewed With patient   Patient Care Overview   Progress no change   Outcome Evaluation   Outcome Summary/Follow up Plan Pt had no c/o pain throughout the day. Heparin gtt and cardizem gtt continued and titrated per orders. Pt converted from a rate controlled afib to NSR. O2 weaned from 6L to 4L (wears 3-4L at home). Pt ambulated with PT today and desires to go back to [facility] upon discharge. All VSS, will continue to monitor.       Problem: Anxiety (Adult)  Goal: Identify Related Risk Factors and Signs and Symptoms  Outcome: Ongoing (interventions implemented as appropriate)    Goal: Reduction/Resolution  Outcome: Ongoing (interventions implemented as appropriate)      Problem: Fall Risk (Adult)  Goal: Identify Related Risk Factors and Signs and Symptoms  Outcome: Ongoing (interventions implemented as appropriate)    Goal: Absence of Falls  Outcome: Ongoing (interventions implemented as appropriate)      Problem: Respiratory Insufficiency (Adult)  Goal: Identify Related Risk Factors and Signs and Symptoms  Outcome: Ongoing (interventions implemented as appropriate)    Goal: Acid/Base Balance  Outcome: Ongoing (interventions implemented as appropriate)    Goal: Effective Ventilation  Outcome: Ongoing (interventions implemented as appropriate)      Problem: Pressure Ulcer Risk (Yoseph Scale) (Adult,Obstetrics,Pediatric)  Goal: Identify Related Risk Factors and Signs and Symptoms  Outcome: Ongoing (interventions implemented as appropriate)    Goal: Skin Integrity  Outcome: Ongoing (interventions implemented as appropriate)

## 2018-01-09 NOTE — PROGRESS NOTES
[facility] Medicine Services  PROGRESS NOTE    Patient Name: Virgie Arroyo  : 1945  MRN: [id number]    Date of Admission: 2018  Length of Stay: 5  Primary Care Physician: Nick Mcginnis MD    Subjective   Subjective     CC:  Sepsis, pneumonia    HPI:  Late note entry saw patient at 1045. She was sitting up in a chair in NAD. She states she feels good today. Just worked with PT and walked in hallway. Oxygen saturation did drop with ambulation.     Review of Systems  Gen- No fevers, chills  CV- No chest pain, palpitations  Resp- (+) cough,  Mild HITCHCOCK  GI- No N/V/D, abd pain    Otherwise ROS is negative except as mentioned in the HPI.    Objective   Objective     Vital Signs:   Temp:  [98 °F (36.7 °C)-98.2 °F (36.8 °C)] 98 °F (36.7 °C)  Heart Rate:  [] 68  Resp:  [16-22] 18  BP: (120-179)/() 150/81        Physical Exam:  Constitutional: No acute distress, awake, alert  HENT: NCAT, mucous membranes moist  Respiratory: rales in bilateral bases, dull in left base and otherwise clear, respiratory effort normal   Cardiovascular: irregular, afib on monitor   Gastrointestinal: Positive bowel sounds, soft, nontender, nondistended  Musculoskeletal: No bilateral ankle edema  Psychiatric: Appropriate affect, cooperative  Neurologic: Oriented x 3, movements symmetric in all extremities, Cranial Nerves grossly intact to confrontation, speech clear  Skin: No rashes, pale       Results Reviewed:  I have personally reviewed current lab, radiology, and data and agree.      Results from last 7 days  Lab Units 18  0814 18  0353   WBC 10*3/mm3 6.99 7.57 10.14   HEMOGLOBIN g/dL 10.8* 10.1* 9.8*   HEMATOCRIT % 33.6* 31.1* 30.4*   PLATELETS 10*3/mm3 223 212 226   INR  0.90  --   --        Results from last 7 days  Lab Units 18  0801 18  0439 188 18  0814 18  0353 18  0646   SODIUM mmol/L  --   --  134 134 133 133    POTASSIUM mmol/L  --   --  3.9 4.1 3.7 4.5   CHLORIDE mmol/L  --   --  95* 96* 96* 93*   CO2 mmol/L  --   --  30.0 32.0* 31.0 31.0   BUN mg/dL  --   --  16 14 17 20   CREATININE mg/dL  --   --  0.70 0.70 0.60 0.80   GLUCOSE mg/dL  --   --  127* 116* 122* 107*   CALCIUM mg/dL  --   --  9.2 8.9 8.5* 8.2*   ALT (SGPT) U/L  --   --  52* 34  --  32   AST (SGOT) U/L  --   --  43* 30  --  31   TROPONIN I ng/mL 0.606* 0.576* 0.596*  --  1.443* 4.637*     BNP   Date Value Ref Range Status   01/08/2018 411.0 (H) 0.0 - 100.0 pg/mL Final     pH, Arterial   Date Value Ref Range Status   01/08/2018 7.459 (H) 7.350 - 7.450 pH units Final       Microbiology Results Abnormal     Procedure Component Value - Date/Time    Blood Culture - Blood, [[id number]]  (Normal) Collected:  01/04/18 1440    Lab Status:  Preliminary result Specimen:  Blood from Arm, Right Updated:  01/08/18 1516     Blood Culture No growth at 4 days    Blood Culture - Blood, [[id number]]  (Normal) Collected:  01/04/18 1445    Lab Status:  Preliminary result Specimen:  Blood from Hand, Left Updated:  01/08/18 1516     Blood Culture No growth at 4 days    Respiratory Panel, PCR - Swab, Nasopharynx [[id number]] Collected:  01/05/18 0609    Lab Status:  Final result Specimen:  Swab from Nasopharynx Updated:  01/07/18 0640     Adenovirus Detection by PCR Not Detected     Coronavirus HKU1 Not Detected     Coronavirus NL63 Not Detected     Coronavirus 229E Not Detected     Coronavirus OC43 Not Detected     Human Metapneumovirus Not Detected     Human Rhinovirus/Enterovirus Not Detected     Influenza A PCR Not Detected     Influenza A H1 Not Detected     Influenza 2009 H1N1 by PCR Not Detected     Influenza A H3 Not Detected     Influenza B PCR Not Detected     Parainfluenza Virus 1 Not Detected     Parainfluenza Virus 2 Not Detected     Parainfluenza Virus 3 Not Detected     Parainfluenza Virus 4 Not Detected     Respiratory Syncytial Virus Not Detected     Bordetella  pertussis pcr Not Detected     Chlamydophila pneumoniae PCR Not Detected     Mycoplasma pneumo by PCR Not Detected    Narrative:       Performed at:  01 - [facility], [place], [place]  [id number]  : Jose Alfredo Cody MD, Phone:  [phone]    Influenza A & B, RT PCR - Swab, Nasopharynx [[id number]]  (Normal) Collected:  01/06/18 1627    Lab Status:  Final result Specimen:  Swab from Nasopharynx Updated:  01/06/18 1736     Influenza A PCR Not Detected     Influenza B PCR Not Detected    Influenza A & B, RT PCR - Swab, Nasopharynx [[id number]]  (Normal) Collected:  01/04/18 1401    Lab Status:  Final result Specimen:  Swab from Nasopharynx Updated:  01/04/18 1503     Influenza A PCR Not Detected     Influenza B PCR Not Detected          Imaging Results (last 24 hours)     Procedure Component Value Units Date/Time    XR Chest 1 View [[id number]] Collected:  01/08/18 2140     Updated:  01/08/18 2314    Narrative:       EXAM:    XR Chest, 1 View    CLINICAL HISTORY:    72 years old, female; Essential (primary) hypertension; Chronic obstructive   pulmonary disease, unspecified; Syncope and collapse; Abnormal levels of other   serum enzymes; Other reduced mobility; Pain; Chest pain; Type not specified    TECHNIQUE:    Frontal view of the chest.    COMPARISON:    CR - XR CHEST 1 VW 2018-01-06 03:46    FINDINGS:    Lungs:  Mild chronic hyperinflation at right apex.  Patient has history of   COPD.  No acute infiltrates, allowing for obesity which obscures some   resolution.  No pneumonia or CHF.  Mild biapical pleural scarring.    Pleural space:  No significant pleural effusion.  Slight focal thickening   along superior aspect of the right major fissure versus trace interfissural   fluid.  No pneumothorax.    Heart:  Mild cardiomegaly.    Mediastinum: No acute abnormality compared to prior exam.    Bones/joints:  Thoracolumbar scoliosis.  Degenerative changes of the spine.    Mild DJD of  bilateral shoulders.      Impression:       1.  Mild cardiomegaly.  2.  COPD.  3.  No pneumonia or CHF.    THIS DOCUMENT HAS BEEN ELECTRONICALLY SIGNED BY JESS GREENE MD        Results for orders placed during the hospital encounter of 12/20/17   Adult Transthoracic Echo Complete W/ Cont if Necessary Per Protocol    Narrative · Left ventricular systolic function is normal.  · Estimated EF appears to be in the range of 66 - 70%.  · Left ventricular diastolic dysfunction (grade I) consistent with   impaired relaxation.  · Left ventricular wall thickness is consistent with mild concentric   hypertrophy.  · Mild aortic valve regurgitation is present.  · Mild to moderate tricuspid valve regurgitation is present.  · Estimated right ventricular systolic pressure from tricuspid   regurgitation is moderately elevated (45-55 mmHg).          I have reviewed the medications.    Assessment/Plan   Assessment / Plan     Hospital Problem List     * (Principal)Sepsis    Overview Signed 1/7/2018  9:19 AM by Kika Valle MD     Fever, hypotension, WBC>12, RML/RLL pneumonia         Hypertension    Hypothyroidism    Dependence on supplemental oxygen    Overview Signed 8/5/2016 10:21 AM by Jake Mtz     · A.  4 L at rest and up to 6 with exertion.            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 female w/ h/o HTN, HLD, Diastolic HF, COPD on home O2, ELIZABETH on CPAP, recent admission to  [facility] 12/20 to 12/27 for AECOPD, was transferred to [facility].  Brought to [facility] ED on 1/4/2018 after witnessed syncopal event.  There was report of a fever to 101.6 and in the ED developed sudden hypotension, worsening hypoxemia and lethargy and was found unresponsive in bed.  He was admitted to the ICU and improved with fluid resuscitation as well as BiPap.  Initial chest x-ray was indeterminant but after appropriate hydration repeat showed obvious RML/RLL pneumonia: patient had afib with RVR on 1/8/18 and was started on IV Cardizem and heparin.,Cardiology was reconsulted to see patient.     Assessment & Plan:  - New to floor from ICU, patient is now nontoxic and markedly improved  - BPs are returning to her hypertensive baseline and likely now can begin to add back her outpatient antihypertensives since sepsis is resolved.  In ICU her home Lasix had been reinitiated, also her home Cozaar but at a decreased dose which we will leave for now. Restart Metoprolol and  Imdur with morning meds if BP's alllow.  - Will transition antibiotics to by mouth equivalents for remaining course  - Alert CM that patient is appropriate for return to [facility], they can begin recertification process    Afib  -- pt had afib with RVR on 1/8/18 was started on Cardizem and heparin drip and cardiology was reconsulted. Some elevated bp during that time which has now resolved. Troponin were trended slightly elevated but not as high as previous ones  -- will check a K, mag, ion calcium,     Tsh was low 0.164, will check a free t4    DVT Prophylaxis:  Lovenox    CODE STATUS: Full Code    Disposition: I expect the patient to be discharged to [facility] once bed available    Ceci Vergraa, APRN  01/09/18  12:44 PM

## 2018-01-09 NOTE — PROCEDURES
Nursing called rapid response due to new onset a fib with RVR with rate in 120s. Pt with c/o chest pain. Pain mid chest, 5/10. Relieved after started on Cardizem gtt. And heparin gtt. EKG noted new a fib with RVR with acute ST change in the lateral leads. Pt recently d/c from ICU, had elevated trop there thought to be secondary to sepsis. Nitro and lopressor given.   Pt denies SOA or nausea or radiation  Of CP.     PE: rate 102,  On cardizem.  bp 127/103, 94% on cpap,  fio2 of 40%  gen A&Ox3, NAD, cpap on   Lungs CTA  CV-irregularly irregular   Ext- no edema.     A/p:  New onset a fib with RVR.   Check mag,  serial trop and tsh. Continue heparin gtt and cardizem gtt until seen by cardio in am. Get stat cxr.     30 minutes of critical care provided. This time excludes other billable procedures. Time does include preparation of documents, medical consultations, review of old records, and direct bedside care. Patient was at high risk for life-threatening deterioration due to a fib with rvr.

## 2018-01-09 NOTE — PROGRESS NOTES
[facility] INFUSION  Therapy for (VTE/Cardiac):   cardiac  Patient Weight: 83.7 kg  Initial Bolus (Y/N):   yes  Any Bolus (Y/N):   yes        Cardiac or Other (Not VTE)   Initial Bolus: 60 units/kg (Max 4,000 units)  Initial rate: 12 units/kg/hr (Max 1,000 units/hr)   aPTT    (sec) Bolus Dose Stop Infusion Rate Change Repeat aPTT    < 35 60 units/kg 0 hrs Increase rate by   4 units/kg/hr 6 hrs    35 - 44 30 units/kg 0 hrs Increase rate by 2 units/kg/hr 6 hrs    45 - 60 0 0 hrs No change 6 hrs    61 - 73 0  0 hrs Decrease rate by 2 units/kg/hr 6 hrs    > 73  0 Hold 1 hr Decrease rate by 3 units/kg/hr 6 hrs          Results from last 7 days     Lab Units 01/08/18  2148 01/07/18  0814 01/06/18  0353   INR  0.90 --  --    HEMOGLOBIN g/dL 10.8* 10.1* 9.8*   HEMATOCRIT % 33.6* 31.1* 30.4*   PLATELETS 10*3/mm3 223 212 226          Date Time   aPTT Current Rate (Unit/kg/hr) Bolus   (Units) Rate Change   (Unit/kg/hr) New Rate (Unit/kg/hr) Next aPTT Comments   1/8/18 2300 pend new 4000  11.9 0600 D/w  kannan

## 2018-01-09 NOTE — THERAPY TREATMENT NOTE
Acute Care - Physical Therapy Treatment Note  [facility]     Patient Name: Virgie Arroyo  : 1945  MRN: [id number]  Today's Date: 2018  Onset of Illness/Injury or Date of Surgery Date: 18  Date of Referral to PT: 18  Referring Physician: IRVING Gutierrez    Admit Date: 2018    Visit Dx:    ICD-10-CM ICD-9-CM   1. Elevated troponin R74.8 790.6   2. Syncope, unspecified syncope type R55 780.2   3. Chronic obstructive pulmonary disease, unspecified COPD type J44.9 496   4. Essential hypertension I10 401.9   5. 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/ELIZABETH   • Severe COPD on home O2 baseline 4 to 6 Liters   • Depression with anxiety   • Chronic diastolic congestive heart failure   • GERD (gastroesophageal reflux disease)   • HLD (hyperlipidemia)   • COPD with exacerbation   • Acute cystitis without hematuria   • Mixed Respiratory failure, acute-on-chronic   • Chronic hyponatremia   • Elevated troponin   • Syncope and collapse on admission due to hypotension of sepsis   • Sepsis   • Pneumonia   • Atrial fibrillation with RVR               Adult Rehabilitation Note       18 0954          Rehab Assessment/Intervention    Discipline physical therapist  -LM      Document Type therapy note (daily note)  -LM      Subjective Information agree to therapy;complains of;weakness  -LM      Patient Effort, Rehab Treatment excellent  -LM      Symptoms Noted During/After Treatment significant change in vital signs  -LM      Symptoms Noted Comment O2 sat dropped to 78% s/p 60 feet of ambulation - RN and APRN notified.  Pt recovered to 91% within 3 minutes of sitting and PLB.  -LM      Precautions/Limitations fall precautions;oxygen therapy device and L/min  -LM      Recorded by [LM] Alia Blevins PT      Vital Signs    Pre Systolic  BP Rehab 131  -LM      Pre Treatment Diastolic BP 78  -LM      Post Systolic BP Rehab 125  -LM      Post Treatment Diastolic BP 81  -LM      Pretreatment Heart Rate (beats/min) 63  -LM      Intratreatment Heart Rate (beats/min) 107   s/p 60 feet of amb  -LM      Posttreatment Heart Rate (beats/min) 61  -LM      Pre SpO2 (%) 89  -LM      O2 Delivery Pre Treatment supplemental O2   4L  -LM      Intra SpO2 (%) 78   s/p 60 feet of amb  -LM      O2 Delivery Intra Treatment supplemental O2   4L  -LM      Post SpO2 (%) 92  -LM      O2 Delivery Post Treatment supplemental O2   4L  -LM      Pre Patient Position Supine  -LM      Intra Patient Position Standing  -LM      Post Patient Position Sitting  -LM      Recorded by [LM] Alia Blevins, PT      Pain Assessment    Pain Assessment 0-10  -LM      Pain Score 0  -LM      Post Pain Score 0  -LM      Recorded by [LM] Alia Blevins PT      Cognitive Assessment/Intervention    Current Cognitive/Communication Assessment functional  -LM      Orientation Status oriented x 4  -LM      Follows Commands/Answers Questions 100% of the time;able to follow single-step instructions  -LM      Personal Safety mild impairment  -LM      Personal Safety Interventions fall prevention program maintained;gait belt;muscle strengthening facilitated;nonskid shoes/slippers when out of bed  -LM      Recorded by [LM] Alia Blevins, PT      Bed Mobility, Assessment/Treatment    Bed Mobility, Assistive Device bed rails;head of bed elevated  -LM      Bed Mobility, Scoot/Bridge, [place] supervision required  -LM      Bed Mob, Supine to Sit, [place] supervision required;verbal cues required  -LM      Bed Mob, Sit to Supine, [place] not tested  -LM      Recorded by [LM] Alia Blevins PT      Transfer Assessment/Treatment    Transfers, Sit-Stand [place] contact guard assist;verbal cues required  -LM      Transfers, Stand-Sit [place] contact guard assist;verbal cues required  -LM       Transfers, Sit-Stand-Sit, Assist Device rolling walker  -LM      Transfer, Comment Vc's for hand placement  -LM      Recorded by [LM] Alia Blevins PT      Gait Assessment/Treatment    Gait, [place] Level contact guard assist;verbal cues required  -LM      Gait, Assistive Device rolling walker  -LM      Gait, Distance (Feet) 60  -LM      Gait, Gait Deviations zunilda decreased;forward flexed posture  -LM      Gait, Safety Issues supplemental O2  -LM      Gait, Impairments strength decreased;impaired balance  -LM      Gait, Comment VC's to stay inside walker and for upright posture.  O2 dropped to 78% s/p amb - recovered within 3 minutes of sitting and PLB.  -LM      Recorded by [DORY] Alia Blevins PT      Stairs Assessment/Treatment    Stairs, [place] Level not tested  -LM      Recorded by [DORY] Alia Blevins PT      Motor Skills/Interventions    Additional Documentation Balance Skills Training (Group)  -LM      Recorded by [DORY] Alia Blevins PT      Balance Skills Training    Sitting-Level of Assistance Distant supervision  -LM      Sitting-Balance Support Feet supported  -LM      Standing-Level of Assistance Contact guard  -LM      Static Standing Balance Support assistive device  -LM      Gait Balance-Level of Assistance Contact guard  -LM      Gait Balance Support assistive device  -LM      Recorded by [LM] Alia Blevins PT      Therapy Exercises    Bilateral Lower Extremities AROM:;10 reps;sitting;ankle pumps/circles;hip flexion;LAQ;hip abduction/adduction  -LM      Recorded by [LM] Alia Blevins PT      Positioning and Restraints    Pre-Treatment Position in bed  -LM      Post Treatment Position chair  -LM      In Chair reclined;call light within reach;encouraged to call for assist;exit alarm on;waffle cushion;on mechanical lift sling;notified nsg  -LM      Recorded by [LM] Alia Blevins PT        User Key  (r) = Recorded By, (t) = Taken By, (c) = Cosigned By    Initials Name Effective Dates    DORY Rojo  Gen, PT 06/15/16 -                 IP PT Goals       01/09/18 0954 01/07/18 1405       Bed Mobility PT STG    Bed Mobility PT STG, Date Established  01/07/18  -SR     Bed Mobility PT STG, Time to Achieve  5 days  -SR     Bed Mobility PT STG, Activity Type  all bed mobility  -SR     Bed Mobility PT STG, [place] Level  independent  -SR     Bed Mobility PT STG, Outcome goal ongoing  -LM      Transfer Training PT LTG    Transfer Training PT LTG, Date Established  01/07/18  -SR     Transfer Training PT LTG, Time to Achieve  by discharge  -SR     Transfer Training PT LTG, Activity Type  all transfers  -SR     Transfer Training PT LTG, [place] Level  conditional independence  -SR     Transfer Training PT LTG, Assist Device  walker, rolling  -SR     Transfer Training PT LTG, Outcome goal ongoing  -LM      Gait Training PT LTG    Gait Training Goal PT LTG, Date Established  01/07/18  -SR     Gait Training Goal PT LTG, Time to Achieve  by discharge  -SR     Gait Training Goal PT LTG, [place] Level  conditional independence  -SR     Gait Training Goal PT LTG, Assist Device  walker, rolling  -SR     Gait Training Goal PT LTG, Distance to Achieve  100  -SR     Gait Training Goal PT LTG, Outcome goal ongoing  -LM      Cardiopulmonary PT LTG    Cardiopulmonary PT LTG, Date Established  01/07/18  -SR     Cardiopulmonary PT LTG, Time to Achieve  by discharge  -SR     Cardiopulmonary PT LTG, Additional Goal  Pt able to maintain O2 sats at or above 85% during mobility.  -SR     Cardiopulmonary PT LTG, Outcome goal ongoing  -LM        User Key  (r) = Recorded By, (t) = Taken By, (c) = Cosigned By    Initials Name Provider Type    SR Arcelia Lozano, PT Physical Therapist    DORY Blevins, PT Physical Therapist          Physical Therapy Education     Title: PT OT SLP Therapies (Active)     Topic: Physical Therapy (Done)     Point: Mobility training (Done)    Learning Progress Summary    Learner Readiness Method  Response Comment Documented by Status   Patient Acceptance E VU,NR  LM 01/09/18 1050 Done    Acceptance E DU,NR PT encourages pt to complete BLE ther ex as able to improve strength. SR 01/07/18 1404 Done               Point: Home exercise program (Done)    Learning Progress Summary    Learner Readiness Method Response Comment Documented by Status   Patient Acceptance E DU,NR PT encourages pt to complete BLE ther ex as able to improve strength. SR 01/07/18 1404 Done               Point: Body mechanics (Done)    Learning Progress Summary    Learner Readiness Method Response Comment Documented by Status   Patient Acceptance E DU,NR PT encourages pt to complete BLE ther ex as able to improve strength. SR 01/07/18 1404 Done               Point: Precautions (Done)    Learning Progress Summary    Learner Readiness Method Response Comment Documented by Status   Patient Acceptance E VU,NR  LM 01/09/18 1050 Done    Acceptance E DU,NR PT encourages pt to complete BLE ther ex as able to improve strength. SR 01/07/18 1404 Done                      User Key     Initials Effective Dates Name Provider Type Discipline     06/19/15 -  Arcelia Lozano, PT Physical Therapist PT     06/15/16 -  Alia Blevins, PT Physical Therapist PT                    PT Recommendation and Plan  Anticipated Equipment Needs At Discharge: rollator (pt wears O2 and freq needs to sit d/t poor endurance)  Anticipated Discharge Disposition: inpatient rehabilitation facility  PT Frequency: daily, per priority policy  Plan of Care Review  Plan Of Care Reviewed With: patient  Outcome Summary/Follow up Plan: Pt progressing towards skilled PT goals.  Pt transferred sup-->sit with SBA, stood with CGA, and ambulated 60 feet with CGA using rw.  O2 dropped to 78% on 4L s/p amb - took ~3 minutes of sitting and PLB to recover to 90%.  Pt will continue to benefit from skilled PT to improve mobility and safety prior to d/c.          Outcome Measures       01/07/18 0097  01/07/18 1305       How much help from another person do you currently need...    Turning from your back to your side while in flat bed without using bedrails? 4  -SR      Moving from lying on back to sitting on the side of a flat bed without bedrails? 3  -SR      Moving to and from a bed to a chair (including a wheelchair)? 3  -SR      Standing up from a chair using your arms (e.g., wheelchair, bedside chair)? 3  -SR      Climbing 3-5 steps with a railing? 2  -SR      To walk in hospital room? 3  -SR      AM-PAC 6 Clicks Score 18  -SR      How much help from another is currently needed...    Putting on and taking off regular lower body clothing?  2  -TA     Bathing (including washing, rinsing, and drying)  2  -TA     Toileting (which includes using toilet bed pan or urinal)  3  -TA     Putting on and taking off regular upper body clothing  3  -TA     Taking care of personal grooming (such as brushing teeth)  3  -TA     Eating meals  4  -TA     Score  17  -TA     Functional Assessment    Outcome Measure Options AM-PAC 6 Clicks Basic Mobility (PT)  -SR AM-PAC 6 Clicks Daily Activity (OT)  -TA       User Key  (r) = Recorded By, (t) = Taken By, (c) = Cosigned By    Initials Name Provider Type    SR Arcelia Lozano, PT Physical Therapist    TA Manolo Paul, OT Occupational Therapist           Time Calculation:         PT Charges       01/09/18 0954          Time Calculation    Start Time 0954  -LM      PT Received On 01/09/18  -LM      PT Goal Re-Cert Due Date 01/17/18  -      Time Calculation- PT    Total Timed Code Minutes- PT 46 minute(s)  -LM        User Key  (r) = Recorded By, (t) = Taken By, (c) = Cosigned By    Initials Name Provider Type     Alia Blevins PT Physical Therapist          Therapy Charges for Today     Code Description Service Date Service Provider Modifiers Qty    04432931101 HC GAIT TRAINING EA 15 MIN 1/9/2018 Alia Blevins, PT GP 2    52077865316 HC PT THER PROC EA 15 MIN 1/9/2018 Alia  Gen, PT GP 1          PT G-Codes  Outcome Measure Options: AM-PAC 6 Clicks Basic Mobility (PT)    Alia Guerrero, PT  1/9/2018

## 2018-01-10 LAB
APTT PPP: 43.1 SECONDS (ref 45–60)
APTT PPP: 51.3 SECONDS (ref 45–60)
APTT PPP: 66.7 SECONDS (ref 45–60)
GLUCOSE BLDC GLUCOMTR-MCNC: 119 MG/DL (ref 70–130)
GLUCOSE BLDC GLUCOMTR-MCNC: 123 MG/DL (ref 70–130)
GLUCOSE BLDC GLUCOMTR-MCNC: 124 MG/DL (ref 70–130)
GLUCOSE BLDC GLUCOMTR-MCNC: 146 MG/DL (ref 70–130)
TROPONIN I SERPL-MCNC: 0.37 NG/ML
TROPONIN I SERPL-MCNC: 0.38 NG/ML
TROPONIN I SERPL-MCNC: 0.45 NG/ML
TROPONIN I SERPL-MCNC: 0.46 NG/ML
TROPONIN I SERPL-MCNC: 0.47 NG/ML
TROPONIN I SERPL-MCNC: 0.51 NG/ML

## 2018-01-10 PROCEDURE — 94799 UNLISTED PULMONARY SVC/PX: CPT

## 2018-01-10 PROCEDURE — 82962 GLUCOSE BLOOD TEST: CPT

## 2018-01-10 PROCEDURE — 99233 SBSQ HOSP IP/OBS HIGH 50: CPT | Performed by: INTERNAL MEDICINE

## 2018-01-10 PROCEDURE — 85730 THROMBOPLASTIN TIME PARTIAL: CPT

## 2018-01-10 PROCEDURE — 94660 CPAP INITIATION&MGMT: CPT

## 2018-01-10 PROCEDURE — 94640 AIRWAY INHALATION TREATMENT: CPT

## 2018-01-10 PROCEDURE — 94760 N-INVAS EAR/PLS OXIMETRY 1: CPT

## 2018-01-10 PROCEDURE — 84484 ASSAY OF TROPONIN QUANT: CPT | Performed by: FAMILY MEDICINE

## 2018-01-10 PROCEDURE — 25010000002 HYDROCORTISONE SODIUM SUCCINATE 100 MG RECONSTITUTED SOLUTION: Performed by: INTERNAL MEDICINE

## 2018-01-10 RX ORDER — ATORVASTATIN CALCIUM 10 MG/1
10 TABLET, FILM COATED ORAL NIGHTLY
Status: DISCONTINUED | OUTPATIENT
Start: 2018-01-10 | End: 2018-01-11 | Stop reason: HOSPADM

## 2018-01-10 RX ORDER — CALCIUM CARBONATE 200(500)MG
2 TABLET,CHEWABLE ORAL 3 TIMES DAILY PRN
Status: DISCONTINUED | OUTPATIENT
Start: 2018-01-10 | End: 2018-01-11 | Stop reason: HOSPADM

## 2018-01-10 RX ADMIN — LORAZEPAM 1 MG: 1 TABLET ORAL at 09:21

## 2018-01-10 RX ADMIN — BUSPIRONE HYDROCHLORIDE 10 MG: 10 TABLET ORAL at 15:45

## 2018-01-10 RX ADMIN — GABAPENTIN 600 MG: 300 CAPSULE ORAL at 21:29

## 2018-01-10 RX ADMIN — LORAZEPAM 1 MG: 1 TABLET ORAL at 15:45

## 2018-01-10 RX ADMIN — GABAPENTIN 600 MG: 300 CAPSULE ORAL at 09:20

## 2018-01-10 RX ADMIN — IPRATROPIUM BROMIDE AND ALBUTEROL SULFATE 3 ML: .5; 3 SOLUTION RESPIRATORY (INHALATION) at 12:02

## 2018-01-10 RX ADMIN — BUSPIRONE HYDROCHLORIDE 10 MG: 10 TABLET ORAL at 09:20

## 2018-01-10 RX ADMIN — ASPIRIN 81 MG: 81 TABLET ORAL at 09:20

## 2018-01-10 RX ADMIN — FUROSEMIDE 40 MG: 40 TABLET ORAL at 09:20

## 2018-01-10 RX ADMIN — Medication 250 MG: at 13:11

## 2018-01-10 RX ADMIN — HYDROCORTISONE SODIUM SUCCINATE 50 MG: 100 INJECTION, POWDER, FOR SOLUTION INTRAMUSCULAR; INTRAVENOUS at 23:52

## 2018-01-10 RX ADMIN — LOSARTAN POTASSIUM 50 MG: 50 TABLET, FILM COATED ORAL at 05:18

## 2018-01-10 RX ADMIN — ATORVASTATIN CALCIUM 10 MG: 10 TABLET, FILM COATED ORAL at 21:29

## 2018-01-10 RX ADMIN — LOSARTAN POTASSIUM 50 MG: 50 TABLET, FILM COATED ORAL at 17:30

## 2018-01-10 RX ADMIN — METOPROLOL TARTRATE 25 MG: 25 TABLET ORAL at 09:20

## 2018-01-10 RX ADMIN — LEVOTHYROXINE SODIUM 50 MCG: 50 TABLET ORAL at 05:18

## 2018-01-10 RX ADMIN — HYDROCORTISONE SODIUM SUCCINATE 50 MG: 100 INJECTION, POWDER, FOR SOLUTION INTRAMUSCULAR; INTRAVENOUS at 13:10

## 2018-01-10 RX ADMIN — MONTELUKAST SODIUM 10 MG: 10 TABLET, FILM COATED ORAL at 21:29

## 2018-01-10 RX ADMIN — BUSPIRONE HYDROCHLORIDE 10 MG: 10 TABLET ORAL at 21:30

## 2018-01-10 RX ADMIN — IPRATROPIUM BROMIDE AND ALBUTEROL SULFATE 3 ML: .5; 3 SOLUTION RESPIRATORY (INHALATION) at 21:03

## 2018-01-10 RX ADMIN — AMOXICILLIN AND CLAVULANATE POTASSIUM 1 TABLET: 875; 125 TABLET, FILM COATED ORAL at 09:20

## 2018-01-10 RX ADMIN — HYDROCODONE BITARTRATE AND ACETAMINOPHEN 1 TABLET: 5; 325 TABLET ORAL at 17:30

## 2018-01-10 RX ADMIN — IPRATROPIUM BROMIDE AND ALBUTEROL SULFATE 3 ML: .5; 3 SOLUTION RESPIRATORY (INHALATION) at 07:11

## 2018-01-10 RX ADMIN — METOPROLOL TARTRATE 25 MG: 25 TABLET ORAL at 21:29

## 2018-01-10 RX ADMIN — PANTOPRAZOLE SODIUM 40 MG: 40 TABLET, DELAYED RELEASE ORAL at 09:20

## 2018-01-10 RX ADMIN — Medication 2 TABLET: at 11:43

## 2018-01-10 RX ADMIN — IPRATROPIUM BROMIDE AND ALBUTEROL SULFATE 3 ML: .5; 3 SOLUTION RESPIRATORY (INHALATION) at 15:56

## 2018-01-10 RX ADMIN — LORAZEPAM 1 MG: 1 TABLET ORAL at 21:31

## 2018-01-10 RX ADMIN — Medication 250 MG: at 17:30

## 2018-01-10 RX ADMIN — GABAPENTIN 600 MG: 300 CAPSULE ORAL at 15:45

## 2018-01-10 RX ADMIN — AMOXICILLIN AND CLAVULANATE POTASSIUM 1 TABLET: 875; 125 TABLET, FILM COATED ORAL at 21:29

## 2018-01-10 RX ADMIN — ISOSORBIDE MONONITRATE 60 MG: 60 TABLET, EXTENDED RELEASE ORAL at 09:20

## 2018-01-10 NOTE — PLAN OF CARE
Problem: Patient Care Overview (Adult)  Goal: Plan of Care Review   01/10/18 0413   Coping/Psychosocial Response Interventions   Plan Of Care Reviewed With patient   Patient Care Overview   Progress improving   Outcome Evaluation   Outcome Summary/Follow up Plan VSS. Continues on cardizem and heparin gtts. No new complaints. Remains in NSR.

## 2018-01-10 NOTE — PROGRESS NOTES
[facility] Medicine Services  PROGRESS NOTE    Patient Name: Virgie Arroyo  : 1945  MRN: [id number]    Date of Admission: 2018  Length of Stay: 6  Primary Care Physician: Nick Mcginnis MD    Subjective   Subjective     CC: f/u sepsis, pna s/p ICU    HPI: No acute events overnight, patient states that she is doing better, breathing is improving, she denies any new chest pain,     Review of Systems  Gen- No fevers, chills  CV- No chest pain, palpitations  Resp- No cough, dyspnea  GI- No N/V/D, abd pain    Otherwise ROS is negative except as mentioned in the HPI.    Objective   Objective     Vital Signs:   Temp:  [97.5 °F (36.4 °C)-98.7 °F (37.1 °C)] 98.4 °F (36.9 °C)  Heart Rate:  [] 126  Resp:  [16-24] 24  BP: (122-167)/() 146/88        Physical Exam:  Constitutional: No acute distress, awake, alert  HENT: NCAT, mucous membranes moist  Respiratory: Clear to auscultation bilaterally, respiratory effort normal   Cardiovascular: RRR, no murmurs, rubs, or gallops, palpable pedal pulses bilaterally  Gastrointestinal: Positive bowel sounds, soft, nontender, nondistended  Musculoskeletal: No bilateral ankle edema  Psychiatric: Appropriate affect, cooperative  Neurologic: Oriented x 3, strength symmetric in all extremities, Cranial Nerves grossly intact to confrontation, speech clear  Skin: No rashes    Results Reviewed:  I have personally reviewed current lab, radiology, and data and agree.      Results from last 7 days  Lab Units 18  0814 18  0353   WBC 10*3/mm3 6.99 7.57 10.14   HEMOGLOBIN g/dL 10.8* 10.1* 9.8*   HEMATOCRIT % 33.6* 31.1* 30.4*   PLATELETS 10*3/mm3 223 212 226   INR  0.90  --   --        Results from last 7 days  Lab Units 01/10/18  1147 01/10/18  0714 01/10/18  0316  18  1158  18  2148 18  0814 18  0353 18  0646   SODIUM mmol/L  --   --   --   --   --   --  134 134 133 133   POTASSIUM  mmol/L  --   --   --   --  3.8  --  3.9 4.1 3.7 4.5   CHLORIDE mmol/L  --   --   --   --   --   --  95* 96* 96* 93*   CO2 mmol/L  --   --   --   --   --   --  30.0 32.0* 31.0 31.0   BUN mg/dL  --   --   --   --   --   --  16 14 17 20   CREATININE mg/dL  --   --   --   --   --   --  0.70 0.70 0.60 0.80   GLUCOSE mg/dL  --   --   --   --   --   --  127* 116* 122* 107*   CALCIUM mg/dL  --   --   --   --   --   --  9.2 8.9 8.5* 8.2*   ALT (SGPT) U/L  --   --   --   --   --   --  52* 34  --  32   AST (SGOT) U/L  --   --   --   --   --   --  43* 30  --  31   TROPONIN I ng/mL 0.454* 0.374* 0.474*  < > 0.540*  < > 0.596*  --  1.443* 4.637*   < > = values in this interval not displayed.  BNP   Date Value Ref Range Status   01/08/2018 411.0 (H) 0.0 - 100.0 pg/mL Final     pH, Arterial   Date Value Ref Range Status   01/08/2018 7.459 (H) 7.350 - 7.450 pH units Final       Microbiology Results Abnormal     Procedure Component Value - Date/Time    Blood Culture - Blood, [[id number]]  (Normal) Collected:  01/04/18 1440    Lab Status:  Final result Specimen:  Blood from Arm, Right Updated:  01/09/18 1516     Blood Culture No growth at 5 days    Blood Culture - Blood, [[id number]]  (Normal) Collected:  01/04/18 1445    Lab Status:  Final result Specimen:  Blood from Hand, Left Updated:  01/09/18 1516     Blood Culture No growth at 5 days    Respiratory Panel, PCR - Swab, Nasopharynx [[id number]] Collected:  01/05/18 0609    Lab Status:  Final result Specimen:  Swab from Nasopharynx Updated:  01/07/18 0640     Adenovirus Detection by PCR Not Detected     Coronavirus HKU1 Not Detected     Coronavirus NL63 Not Detected     Coronavirus 229E Not Detected     Coronavirus OC43 Not Detected     Human Metapneumovirus Not Detected     Human Rhinovirus/Enterovirus Not Detected     Influenza A PCR Not Detected     Influenza A H1 Not Detected     Influenza 2009 H1N1 by PCR Not Detected     Influenza A H3 Not Detected     Influenza B PCR Not  Detected     Parainfluenza Virus 1 Not Detected     Parainfluenza Virus 2 Not Detected     Parainfluenza Virus 3 Not Detected     Parainfluenza Virus 4 Not Detected     Respiratory Syncytial Virus Not Detected     Bordetella pertussis pcr Not Detected     Chlamydophila pneumoniae PCR Not Detected     Mycoplasma pneumo by PCR Not Detected    Narrative:       Performed at:  01 - [facility]  [id number]  : Jose Alfredo Cody MD, Phone:  [phone]    Influenza A & B, RT PCR - Swab, Nasopharynx [[id number]]  (Normal) Collected:  01/06/18 1626    Lab Status:  Final result Specimen:  Swab from Nasopharynx Updated:  01/06/18 1736     Influenza A PCR Not Detected     Influenza B PCR Not Detected    Influenza A & B, RT PCR - Swab, Nasopharynx [[id number]]  (Normal) Collected:  01/04/18 1401    Lab Status:  Final result Specimen:  Swab from Nasopharynx Updated:  01/04/18 1503     Influenza A PCR Not Detected     Influenza B PCR Not Detected          Imaging Results (last 24 hours)     ** No results found for the last 24 hours. **        Results for orders placed during the hospital encounter of 12/20/17   Adult Transthoracic Echo Complete W/ Cont if Necessary Per Protocol    Narrative · Left ventricular systolic function is normal.  · Estimated EF appears to be in the range of 66 - 70%.  · Left ventricular diastolic dysfunction (grade I) consistent with   impaired relaxation.  · Left ventricular wall thickness is consistent with mild concentric   hypertrophy.  · Mild aortic valve regurgitation is present.  · Mild to moderate tricuspid valve regurgitation is present.  · Estimated right ventricular systolic pressure from tricuspid   regurgitation is moderately elevated (45-55 mmHg).          I have reviewed the medications.    Assessment/Plan   Assessment / Plan     Hospital Problem List     * (Principal)Sepsis    Overview Signed 1/7/2018  9:19 AM by Kika Valle MD     Fever,  hypotension, WBC>12, RML/RLL pneumonia         Hypertension    Hypothyroidism    Dependence on supplemental oxygen    Overview Signed 8/5/2016 10:21 AM by Jake Mtz     · A.  4 L at rest and up to 6 with exertion.            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 female w/ h/o HTN, HLD, Diastolic HF, COPD on home O2, ELIZABETH on CPAP, recent admission to [facility] 12/20 to 12/27 for AECOPD, was transferred to [facility].  Brought to [facility] ED on 1/4/2018 after witnessed syncopal event.  There was report of a fever to 101.6 and in the ED developed sudden hypotension, worsening hypoxemia and lethargy and was found unresponsive in bed.  He was admitted to the ICU and improved with fluid resuscitation as well as BiPap.  Initial chest x-ray was indeterminant but after appropriate hydration repeat showed obvious RML/RLL pneumonia: patient had afib with RVR on 1/8/18 and was started on IV Cardizem and heparin.,Cardiology was reconsulted to see patient.      Assessment & Plan:  - BPs are returning to her hypertensive baseline and likely now can begin to add back her outpatient antihypertensives since sepsis is resolved.  In ICU her home Lasix had been reinitiated, also her home Cozaar but at a decreased dose which we will leave for now. Restart Metoprolol and  Imdur with morning meds if BP's alllow.  - Continue PO Augmentin for remaining course  -- pt had afib with RVR on 1/8/18 was  started on Cardizem and heparin drip and cardiology was reconsulted. Troponin were trended peaked at 0.474.  - electrolytes wnl, Tsh was low 0.164, however, free t4 wnl, continue home synthroid     DVT Prophylaxis:  Lovenox     CODE STATUS: Full Code     Disposition: I expect the patient to be discharged to [facility] once bed available    Eleanor Malik MD  01/10/18  1:58 PM

## 2018-01-10 NOTE — PROGRESS NOTES
Continued Stay Note  [facility]     Patient Name: Virgie Arroyo  MRN: [id number]  Today's Date: 1/10/2018    Admit Date: 1/4/2018          Discharge Plan       01/10/18 1611    Case Management/Social Work Plan    Additional Comments Spoke with [facility] this AM, they can offer Ms. Arroyo a bed for rehab when she is medically ready. Her insurance was precerted two days ago and may need to be updated before transferring per [facility].  Family will transport. CM will cont to follow.               Discharge Codes     None        Expected Discharge Date and Time     Expected Discharge Date Expected Discharge Time    Jan 9, 2018             Chaparrita Chambers RN

## 2018-01-11 VITALS
BODY MASS INDEX: 35.53 KG/M2 | SYSTOLIC BLOOD PRESSURE: 139 MMHG | HEART RATE: 88 BPM | WEIGHT: 181 LBS | DIASTOLIC BLOOD PRESSURE: 92 MMHG | HEIGHT: 60 IN | OXYGEN SATURATION: 95 % | TEMPERATURE: 98.5 F | RESPIRATION RATE: 16 BRPM

## 2018-01-11 LAB
APTT PPP: 42 SECONDS (ref 45–60)
APTT PPP: 56.2 SECONDS (ref 45–60)
GLUCOSE BLDC GLUCOMTR-MCNC: 115 MG/DL (ref 70–130)
GLUCOSE BLDC GLUCOMTR-MCNC: 95 MG/DL (ref 70–130)
TROPONIN I SERPL-MCNC: 0.31 NG/ML
TROPONIN I SERPL-MCNC: 0.33 NG/ML
TROPONIN I SERPL-MCNC: 0.34 NG/ML
TROPONIN I SERPL-MCNC: 0.35 NG/ML

## 2018-01-11 PROCEDURE — 94799 UNLISTED PULMONARY SVC/PX: CPT

## 2018-01-11 PROCEDURE — 97530 THERAPEUTIC ACTIVITIES: CPT

## 2018-01-11 PROCEDURE — 97530 THERAPEUTIC ACTIVITIES: CPT | Performed by: PHYSICAL THERAPIST

## 2018-01-11 PROCEDURE — 94660 CPAP INITIATION&MGMT: CPT

## 2018-01-11 PROCEDURE — 25010000002 HEPARIN (PORCINE) PER 1000 UNITS

## 2018-01-11 PROCEDURE — 84484 ASSAY OF TROPONIN QUANT: CPT | Performed by: FAMILY MEDICINE

## 2018-01-11 PROCEDURE — 85730 THROMBOPLASTIN TIME PARTIAL: CPT

## 2018-01-11 PROCEDURE — 25010000002 HYDROCORTISONE SODIUM SUCCINATE 100 MG RECONSTITUTED SOLUTION: Performed by: INTERNAL MEDICINE

## 2018-01-11 PROCEDURE — 94640 AIRWAY INHALATION TREATMENT: CPT

## 2018-01-11 PROCEDURE — 99239 HOSP IP/OBS DSCHRG MGMT >30: CPT | Performed by: INTERNAL MEDICINE

## 2018-01-11 PROCEDURE — 82962 GLUCOSE BLOOD TEST: CPT

## 2018-01-11 RX ORDER — PREDNISONE 10 MG/1
TABLET ORAL
Start: 2018-01-11

## 2018-01-11 RX ORDER — AMOXICILLIN AND CLAVULANATE POTASSIUM 875; 125 MG/1; MG/1
1 TABLET, FILM COATED ORAL EVERY 12 HOURS SCHEDULED
Qty: 5 TABLET | Refills: 0 | Status: SHIPPED | OUTPATIENT
Start: 2018-01-11 | End: 2018-01-14

## 2018-01-11 RX ADMIN — Medication 250 MG: at 12:17

## 2018-01-11 RX ADMIN — PANTOPRAZOLE SODIUM 40 MG: 40 TABLET, DELAYED RELEASE ORAL at 08:37

## 2018-01-11 RX ADMIN — LOSARTAN POTASSIUM 50 MG: 50 TABLET, FILM COATED ORAL at 06:09

## 2018-01-11 RX ADMIN — AMOXICILLIN AND CLAVULANATE POTASSIUM 1 TABLET: 875; 125 TABLET, FILM COATED ORAL at 08:37

## 2018-01-11 RX ADMIN — BUSPIRONE HYDROCHLORIDE 10 MG: 10 TABLET ORAL at 08:37

## 2018-01-11 RX ADMIN — APIXABAN 5 MG: 5 TABLET, FILM COATED ORAL at 12:21

## 2018-01-11 RX ADMIN — ISOSORBIDE MONONITRATE 60 MG: 60 TABLET, EXTENDED RELEASE ORAL at 08:37

## 2018-01-11 RX ADMIN — METOPROLOL TARTRATE 25 MG: 25 TABLET ORAL at 08:37

## 2018-01-11 RX ADMIN — GABAPENTIN 600 MG: 300 CAPSULE ORAL at 08:37

## 2018-01-11 RX ADMIN — FUROSEMIDE 40 MG: 40 TABLET ORAL at 08:37

## 2018-01-11 RX ADMIN — LEVOTHYROXINE SODIUM 50 MCG: 50 TABLET ORAL at 06:09

## 2018-01-11 RX ADMIN — IPRATROPIUM BROMIDE AND ALBUTEROL SULFATE 3 ML: .5; 3 SOLUTION RESPIRATORY (INHALATION) at 15:56

## 2018-01-11 RX ADMIN — HEPARIN SODIUM 10 UNITS/KG/HR: 10000 INJECTION, SOLUTION INTRAVENOUS at 06:09

## 2018-01-11 RX ADMIN — IPRATROPIUM BROMIDE AND ALBUTEROL SULFATE 3 ML: .5; 3 SOLUTION RESPIRATORY (INHALATION) at 07:19

## 2018-01-11 RX ADMIN — IPRATROPIUM BROMIDE AND ALBUTEROL SULFATE 3 ML: .5; 3 SOLUTION RESPIRATORY (INHALATION) at 11:53

## 2018-01-11 RX ADMIN — HYDROCODONE BITARTRATE AND ACETAMINOPHEN 1 TABLET: 5; 325 TABLET ORAL at 12:17

## 2018-01-11 RX ADMIN — ASPIRIN 81 MG: 81 TABLET ORAL at 08:37

## 2018-01-11 RX ADMIN — HYDROCORTISONE SODIUM SUCCINATE 50 MG: 100 INJECTION, POWDER, FOR SOLUTION INTRAMUSCULAR; INTRAVENOUS at 12:16

## 2018-01-11 NOTE — THERAPY TREATMENT NOTE
Acute Care - Occupational Therapy Treatment Note  [facility]     Patient Name: Virgie Arroyo  : 1945  MRN: [id number]  Today's Date: 2018  Onset of Illness/Injury or Date of Surgery Date: 18  Date of Referral to OT: 18  Referring Physician: IRVING Gutierrez      Admit Date: 2018    Visit Dx:     ICD-10-CM ICD-9-CM   1. Elevated troponin R74.8 790.6   2. Syncope, unspecified syncope type R55 780.2   3. Chronic obstructive pulmonary disease, unspecified COPD type J44.9 496   4. Essential hypertension I10 401.9   5. Impaired mobility and ADLs Z74.09 799.89   6. Atrial fibrillation with RVR I48.91 427.31     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/ELIZABETH   • Severe COPD on home O2 baseline 4 to 6 Liters   • Depression with anxiety   • Chronic diastolic congestive heart failure   • GERD (gastroesophageal reflux disease)   • HLD (hyperlipidemia)   • COPD with exacerbation   • Acute cystitis without hematuria   • Mixed Respiratory failure, acute-on-chronic   • Chronic hyponatremia   • Elevated troponin   • Syncope and collapse on admission due to hypotension of sepsis   • Sepsis   • Pneumonia   • Atrial fibrillation with RVR             Adult Rehabilitation Note       18 1307 18 0833 18 0954    Rehab Assessment/Intervention    Discipline occupational therapist  -TA physical therapist  -LM physical therapist  -LM    Document Type therapy note (daily note)  -TA therapy note (daily note)  -LM therapy note (daily note)  -LM    Subjective Information agree to therapy;no complaints  -TA agree to therapy;no complaints  -LM agree to therapy;complains of;weakness  -LM    Patient Effort, Rehab Treatment good  -TA good  -LM excellent  -LM    Symptoms Noted During/After Treatment  significant change in vital signs  -LM significant change in vital signs  -LM     Symptoms Noted Comment  Tx limited 2* to elevated BP.  Supine - 175/118; Sitting EOB - 172/113; s/p transferring to chair - 179/110.  RN notified and gave BP meds while pt sitting EOB.  Did not ambulate 2* to HTN.  -LM O2 sat dropped to 78% s/p 60 feet of ambulation - RN and APRN notified.  Pt recovered to 91% within 3 minutes of sitting and PLB.  -LM    Precautions/Limitations fall precautions;oxygen therapy device and L/min  -TA fall precautions;oxygen therapy device and L/min  -LM fall precautions;oxygen therapy device and L/min  -LM    Recorded by [TA] Manolo Paul OT [LM] Alia Blevins PT [LM] Alia Blevins, NAGI    Vital Signs    Pre Systolic BP Rehab --   VSS  -  -  -LM    Pre Treatment Diastolic BP  118  -LM 78  -LM    Intra Systolic BP Rehab  172   Sitting EOB  -LM     Intra Treatment Diastolic BP  113   Sitting EOB  -LM     Post Systolic BP Rehab  179  -  -LM    Post Treatment Diastolic BP  110  -LM 81  -LM    Pretreatment Heart Rate (beats/min)  126  -LM 63  -LM    Intratreatment Heart Rate (beats/min)   107   s/p 60 feet of amb  -LM    Posttreatment Heart Rate (beats/min)  87  -LM 61  -LM    Pre SpO2 (%)  95  -LM 89  -LM    O2 Delivery Pre Treatment  supplemental O2   4L  -LM supplemental O2   4L  -LM    Intra SpO2 (%)   78   s/p 60 feet of amb  -LM    O2 Delivery Intra Treatment   supplemental O2   4L  -LM    Post SpO2 (%)  91  -LM 92  -LM    O2 Delivery Post Treatment  supplemental O2   4L  -LM supplemental O2   4L  -LM    Pre Patient Position Sitting  -TA Supine  -LM Supine  -LM    Intra Patient Position Sitting  -TA Standing  -LM Standing  -LM    Post Patient Position Sitting  -TA Sitting  -LM Sitting  -LM    Recorded by [TA] Manolo Paul OT [LM] Alia Blevins, PT [LM] Alia Blevins, NAGI    Pain Assessment    Pain Assessment 0-10  -TA 0-10  -LM 0-10  -LM    Pain Score 0  -TA 0  -LM 0  -LM    Post Pain Score 0  -TA 0  -LM 0  -LM    Recorded by [TA] Manolo Paul OT [LM]  Alia Blevins PT [LM] Alia Blevins PT    Cognitive Assessment/Intervention    Current Cognitive/Communication Assessment functional  -TA functional  -LM functional  -LM    Orientation Status oriented x 4  -TA oriented x 4  -LM oriented x 4  -LM    Follows Commands/Answers Questions 100% of the time  -% of the time;able to follow single-step instructions  -% of the time;able to follow single-step instructions  -LM    Personal Safety mild impairment  -TA mild impairment  -LM mild impairment  -LM    Personal Safety Interventions fall prevention program maintained;gait belt;muscle strengthening facilitated;nonskid shoes/slippers when out of bed;supervised activity  -TA fall prevention program maintained;gait belt;nonskid shoes/slippers when out of bed  -LM fall prevention program maintained;gait belt;muscle strengthening facilitated;nonskid shoes/slippers when out of bed  -LM    Recorded by [TA] Manolo Paul OT [LM] Alia Blevins PT [LM] Alia Blevins PT    Bed Mobility, Assessment/Treatment    Bed Mobility, Assistive Device  bed rails;head of bed elevated  -LM bed rails;head of bed elevated  -LM    Bed Mobility, Scoot/Bridge, Fallon  supervision required  -LM supervision required  -LM    Bed Mob, Supine to Sit, Fallon  supervision required;verbal cues required  -LM supervision required;verbal cues required  -LM    Bed Mob, Sit to Supine, Fallon  not tested  -LM not tested  -LM    Bed Mobility, Comment Pt UIC  -TA Pt sat EOB with SBA for ~6 minutes.  -LM     Recorded by [TA] Manolo Paul OT [LM] Alia Blevins PT [LM] Alia Blevins PT    Transfer Assessment/Treatment    Transfers, Bed-Chair Fallon  contact guard assist;verbal cues required   Bed-->BSC  -LM     Transfers, Chair-Bed Fallon  contact guard assist;verbal cues required   BSC-->chair  -LM     Transfers, Bed-Chair-Bed, Assist Device  rolling walker  -LM     Transfers, Sit-Stand Fallon  contact guard  assist;verbal cues required  -LM contact guard assist;verbal cues required  -LM    Transfers, Stand-Sit Storey  contact guard assist;verbal cues required  -LM contact guard assist;verbal cues required  -LM    Transfers, Sit-Stand-Sit, Assist Device  rolling walker  -LM rolling walker  -LM    Toilet Transfer, Storey  contact guard assist;verbal cues required  -LM     Toilet Transfer, Assistive Device  rolling walker  -LM     Transfer, Comment  Vc's for hand placement.  Pt first transferred from bed-->BSC.  Pt able to complete pericare while PT provided CGA for standing balance.  Pt then transferred over to chair.  -LM Vc's for hand placement  -LM    Recorded by  [LM] Alia Blevins PT [LM] Alia Blevins PT    Gait Assessment/Treatment    Gait, Storey Level  not appropriate to assess  -LM contact guard assist;verbal cues required  -LM    Gait, Assistive Device   rolling walker  -LM    Gait, Distance (Feet)   60  -LM    Gait, Gait Deviations   zunilda decreased;forward flexed posture  -LM    Gait, Safety Issues   supplemental O2  -LM    Gait, Impairments   strength decreased;impaired balance  -LM    Gait, Comment   VC's to stay inside walker and for upright posture.  O2 dropped to 78% s/p amb - recovered within 3 minutes of sitting and PLB.  -LM    Recorded by  [LM] Alia Blevins PT [LM] Alia Blevins PT    Stairs Assessment/Treatment    Stairs, Storey Level  not tested  -LM not tested  -LM    Recorded by  [LM] Alia Blevins PT [LM] Alia Blevins PT    Motor Skills/Interventions    Additional Documentation  Balance Skills Training (Group)  -LM Balance Skills Training (Group)  -LM    Recorded by  [LM] Alia Blevins PT [LM] Alia Blevins PT    Balance Skills Training    Sitting-Level of Assistance Distant supervision  -TA Distant supervision  -LM Distant supervision  -LM    Sitting-Balance Support Feet supported  -TA Feet supported  -LM Feet supported  -LM    Standing-Level of Assistance  Contact  guard  -LM Contact guard  -LM    Static Standing Balance Support  assistive device  -LM assistive device  -LM    Gait Balance-Level of Assistance  Contact guard  -LM Contact guard  -LM    Gait Balance Support  assistive device  -LM assistive device  -LM    Recorded by [TA] Manolo Paul OT [LM] Alia Blevins PT [LM] Alia Blevins PT    Therapy Exercises    Bilateral Lower Extremities   AROM:;10 reps;sitting;ankle pumps/circles;hip flexion;LAQ;hip abduction/adduction  -LM    Bilateral Upper Extremity AROM:;10 reps;sitting;elbow flexion/extension;hand pumps;shoulder extension/flexion;shoulder horizontal abd/add   With focus on adaptive breathing education with exertion  -TA      Recorded by [TA] Manolo Paul OT  [LM] Alia Blevins PT    Positioning and Restraints    Pre-Treatment Position sitting in chair/recliner  -TA in bed  -LM in bed  -LM    Post Treatment Position chair  -TA chair  -LM chair  -LM    In Chair reclined;call light within reach;encouraged to call for assist;exit alarm on;waffle cushion;on mechanical lift sling;legs elevated  -TA reclined;call light within reach;encouraged to call for assist;exit alarm on;waffle cushion;on mechanical lift sling;notified nsg  -LM reclined;call light within reach;encouraged to call for assist;exit alarm on;waffle cushion;on mechanical lift sling;notified nsg  -LM    Recorded by [TA] Manolo Paul OT [LM] Alia Blevins PT [LM] Alia Blevins PT      User Key  (r) = Recorded By, (t) = Taken By, (c) = Cosigned By    Initials Name Effective Dates    LM Alia Blevins PT 06/15/16 -     TA Manolo Paul OT 03/14/16 -                 OT Goals       01/11/18 1322 01/07/18 1401       Transfer Training OT LTG    Transfer Training OT LTG, Outcome goal ongoing  -TA      Patient Education OT LTG    Patient Education OT LTG, Date Established  01/07/18  -TA     Patient Education OT LTG, Time to Achieve  1 wk  -TA     Patient Education OT LTG, Education Type   HEP;adaptive breathing;energy conservation  -TA     Patient Education OT LTG, Education Understanding  verbalizes understanding  -TA     Patient Education OT LTG Outcome goal met  -TA goal ongoing  -TA     Toileting OT LTG    Toileting Goal OT LTG, Date Established  01/07/18  -TA     Toileting Goal OT LTG, Time to Achieve  1 wk  -TA     Toileting Goal OT LTG, Bracken Level  supervision required  -TA     Toileting Goal OT LTG, Outcome goal ongoing  -TA goal ongoing  -TA     LB Dressing OT LTG    LB Dressing Goal OT LTG, Date Established  01/07/18  -TA     LB Dressing Goal OT LTG, Time to Achieve  1 wk  -TA     LB Dressing Goal OT LTG, Bracken Level  minimum assist (75% patient effort);verbal cues required   don/doff socks  -TA     LB Dressing Goal OT LTG, Outcome goal ongoing  -TA goal ongoing  -TA       User Key  (r) = Recorded By, (t) = Taken By, (c) = Cosigned By    Initials Name Provider Type    DEBORAH Paul OT Occupational Therapist          Occupational Therapy Education     Title: PT OT SLP Therapies (Active)     Topic: Occupational Therapy (Active)     Point: ADL training (Done)    Description: Instruct learner(s) on proper safety adaptation and remediation techniques during self care or transfers.   Instruct in proper use of assistive devices.    Learning Progress Summary    Learner Readiness Method Response Comment Documented by Status   Patient Acceptance CASS,LEXA MILIAN,MIRIAN,RILEY Role of OT; education re adaptive breathing, pacing/EC; reinforced need for call for assist with OOB activities. TA 01/07/18 1400 Done               Point: Home exercise program (Done)    Description: Instruct learner(s) on appropriate technique for monitoring, assisting and/or progressing therapeutic exercises/activities.    Learning Progress Summary    Learner Readiness Method Response Comment Documented by Status   Patient Acceptance ESTEBAN ODELL D VU,DU Ongoing education regarding adaptive breathing with exertion and use of  incentive spirometer to strengthen lung function/manage SOA. Reinfoced need for call for assist with OOB activities. TA 01/11/18 1321 Done    Acceptance LEXA ODELL,MIRIAN,RILEY Role of OT; education re adaptive breathing, pacing/EC; reinforced need for call for assist with OOB activities. TA 01/07/18 1400 Done               Point: Precautions (Done)    Description: Instruct learner(s) on prescribed precautions during self-care and functional transfers.    Learning Progress Summary    Learner Readiness Method Response Comment Documented by Status   Patient Acceptance ESTEBAN ODELL D VU,RILEY Ongoing education regarding adaptive breathing with exertion and use of incentive spirometer to strengthen lung function/manage SOA. Reinfoced need for call for assist with OOB activities. TA 01/11/18 1321 Done    Acceptance LEXA ODELL,MIRIAN,RILEY Role of OT; education re adaptive breathing, pacing/EC; reinforced need for call for assist with OOB activities. TA 01/07/18 1400 Done                      User Key     Initials Effective Dates Name Provider Type Discipline    TA 03/14/16 -  Manolo Paul, OT Occupational Therapist OT                  OT Recommendation and Plan  Anticipated Discharge Disposition: inpatient rehabilitation facility  Planned Therapy Interventions: activity intolerance, ADL retraining, balance training, energy conservation, home exercise program, strengthening, transfer training  Therapy Frequency: daily (Per priority policy)  Plan of Care Review  Plan Of Care Reviewed With: patient  Progress: improving  Outcome Summary/Follow up Plan: Pt engaged in education/training regarding adaptive breathing with exertion and use of incentive spirometer to increase lung function and manage SOA. Continue per OT POC. Recommend IP rehab at discharge.        Outcome Measures       01/11/18 1307 01/11/18 0833       How much help from another person do you currently need...    Turning from your back to your side while in flat bed without using  bedrails?  4  -LM     Moving from lying on back to sitting on the side of a flat bed without bedrails?  3  -LM     Moving to and from a bed to a chair (including a wheelchair)?  3  -LM     Standing up from a chair using your arms (e.g., wheelchair, bedside chair)?  3  -LM     Climbing 3-5 steps with a railing?  2  -LM     To walk in hospital room?  3  -LM     AM-PAC 6 Clicks Score  18  -LM     How much help from another is currently needed...    Putting on and taking off regular lower body clothing? 2  -TA      Bathing (including washing, rinsing, and drying) 2  -TA      Toileting (which includes using toilet bed pan or urinal) 3  -TA      Putting on and taking off regular upper body clothing 3  -TA      Taking care of personal grooming (such as brushing teeth) 4  -TA      Eating meals 4  -TA      Score 18  -TA      Functional Assessment    Outcome Measure Options AM-PAC 6 Clicks Daily Activity (OT)  -TA AM-PAC 6 Clicks Basic Mobility (PT)  -LM       User Key  (r) = Recorded By, (t) = Taken By, (c) = Cosigned By    Initials Name Provider Type    LM Alia Blevins, PT Physical Therapist    DEBORAH Paul OT Occupational Therapist           Time Calculation:         Time Calculation- OT       01/11/18 1327          Time Calculation- OT    OT Start Time 1307   ttc 9 minutes  -TA      Total Timed Code Minutes- OT 9 minute(s)  -TA      OT Received On 01/11/18  -TA      OT Goal Re-Cert Due Date 01/17/18  -TA        User Key  (r) = Recorded By, (t) = Taken By, (c) = Cosigned By    Initials Name Provider Type    DEBORAH Paul OT Occupational Therapist           Therapy Charges for Today     Code Description Service Date Service Provider Modifiers Qty    [id number]  OT THERAPEUTIC ACT EA 15 MIN 1/11/2018 Manolo Paul OT GO 1               Manolo Paul OT  1/11/2018

## 2018-01-11 NOTE — PROGRESS NOTES
Continued Stay Note  [facility]     Patient Name: Virgie Arroyo  MRN: [id number]  Today's Date: 1/11/2018    Admit Date: 1/4/2018          Discharge Plan       01/11/18 0910    Case Management/Social Work Plan    Plan Rehab    Patient/Family In Agreement With Plan other (see comments)    Additional Comments Received call from Shaunna at [facility] that pt does not have a bed today and will not have a bed on subacute until Wednesday 1/17 next week. She will call  if a bed becomes available sooner. Will discuss with pt other rehab options.               Discharge Codes     None        Expected Discharge Date and Time     Expected Discharge Date Expected Discharge Time    Jan 9, 2018             Holly Mathew

## 2018-01-11 NOTE — THERAPY TREATMENT NOTE
Acute Care - Physical Therapy Treatment Note  [facility]     Patient Name: Virgie Arroyo  : 1945  MRN: [id number]  Today's Date: 2018  Onset of Illness/Injury or Date of Surgery Date: 18  Date of Referral to PT: 18  Referring Physician: IRVING Gutierrez    Admit Date: 2018    Visit Dx:    ICD-10-CM ICD-9-CM   1. Elevated troponin R74.8 790.6   2. Syncope, unspecified syncope type R55 780.2   3. Chronic obstructive pulmonary disease, unspecified COPD type J44.9 496   4. Essential hypertension I10 401.9   5. 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/ELIZABETH   • Severe COPD on home O2 baseline 4 to 6 Liters   • Depression with anxiety   • Chronic diastolic congestive heart failure   • GERD (gastroesophageal reflux disease)   • HLD (hyperlipidemia)   • COPD with exacerbation   • Acute cystitis without hematuria   • Mixed Respiratory failure, acute-on-chronic   • Chronic hyponatremia   • Elevated troponin   • Syncope and collapse on admission due to hypotension of sepsis   • Sepsis   • Pneumonia   • Atrial fibrillation with RVR               Adult Rehabilitation Note       18 0833 18 0954       Rehab Assessment/Intervention    Discipline physical therapist  -LM physical therapist  -LM     Document Type therapy note (daily note)  -LM therapy note (daily note)  -LM     Subjective Information agree to therapy;no complaints  -LM agree to therapy;complains of;weakness  -LM     Patient Effort, Rehab Treatment good  -LM excellent  -LM     Symptoms Noted During/After Treatment significant change in vital signs  -LM significant change in vital signs  -LM     Symptoms Noted Comment Tx limited 2* to elevated BP.  Supine - 175/118; Sitting EOB - 172/113; s/p transferring to chair - 179/110.  RN notified and gave BP meds  while pt sitting EOB.  Did not ambulate 2* to HTN.  -LM O2 sat dropped to 78% s/p 60 feet of ambulation - RN and APRN notified.  Pt recovered to 91% within 3 minutes of sitting and PLB.  -LM     Precautions/Limitations fall precautions;oxygen therapy device and L/min  -LM fall precautions;oxygen therapy device and L/min  -LM     Recorded by [LM] Alia Blevins PT [LM] Alia Blevins PT     Vital Signs    Pre Systolic BP Rehab 175  -  -LM     Pre Treatment Diastolic   -LM 78  -LM     Intra Systolic BP Rehab 172   Sitting EOB  -LM      Intra Treatment Diastolic    Sitting EOB  -LM      Post Systolic BP Rehab 179  -  -LM     Post Treatment Diastolic   -LM 81  -LM     Pretreatment Heart Rate (beats/min) 126  -LM 63  -LM     Intratreatment Heart Rate (beats/min)  107   s/p 60 feet of amb  -LM     Posttreatment Heart Rate (beats/min) 87  -LM 61  -LM     Pre SpO2 (%) 95  -LM 89  -LM     O2 Delivery Pre Treatment supplemental O2   4L  -LM supplemental O2   4L  -LM     Intra SpO2 (%)  78   s/p 60 feet of amb  -LM     O2 Delivery Intra Treatment  supplemental O2   4L  -LM     Post SpO2 (%) 91  -LM 92  -LM     O2 Delivery Post Treatment supplemental O2   4L  -LM supplemental O2   4L  -LM     Pre Patient Position Supine  -LM Supine  -LM     Intra Patient Position Standing  -LM Standing  -LM     Post Patient Position Sitting  -LM Sitting  -LM     Recorded by [LM] Alia Blevins PT [LM] Alia Blevins PT     Pain Assessment    Pain Assessment 0-10  -LM 0-10  -LM     Pain Score 0  -LM 0  -LM     Post Pain Score 0  -LM 0  -LM     Recorded by [LM] Alia Blevins PT [LM] Alia Blevins PT     Cognitive Assessment/Intervention    Current Cognitive/Communication Assessment functional  -LM functional  -LM     Orientation Status oriented x 4  -LM oriented x 4  -LM     Follows Commands/Answers Questions 100% of the time;able to follow single-step instructions  -% of the time;able to follow single-step  instructions  -LM     Personal Safety mild impairment  -LM mild impairment  -LM     Personal Safety Interventions fall prevention program maintained;gait belt;nonskid shoes/slippers when out of bed  -LM fall prevention program maintained;gait belt;muscle strengthening facilitated;nonskid shoes/slippers when out of bed  -LM     Recorded by [LM] Alia Blevins, PT [LM] Alia Blevins, PT     Bed Mobility, Assessment/Treatment    Bed Mobility, Assistive Device bed rails;head of bed elevated  -LM bed rails;head of bed elevated  -LM     Bed Mobility, Scoot/Bridge, [place] supervision required  -LM supervision required  -LM     Bed Mob, Supine to Sit, [place] supervision required;verbal cues required  -LM supervision required;verbal cues required  -LM     Bed Mob, Sit to Supine, [place] not tested  -LM not tested  -LM     Bed Mobility, Comment Pt sat EOB with SBA for ~6 minutes.  -LM      Recorded by [LM] Alia Blevins PT [LM] Alia Blevins PT     Transfer Assessment/Treatment    Transfers, Bed-Chair [place] contact guard assist;verbal cues required   Bed-->BSC  -LM      Transfers, Chair-Bed [place] contact guard assist;verbal cues required   BSC-->chair  -LM      Transfers, Bed-Chair-Bed, Assist Device rolling walker  -LM      Transfers, Sit-Stand [place] contact guard assist;verbal cues required  -LM contact guard assist;verbal cues required  -LM     Transfers, Stand-Sit [place] contact guard assist;verbal cues required  -LM contact guard assist;verbal cues required  -LM     Transfers, Sit-Stand-Sit, Assist Device rolling walker  -LM rolling walker  -LM     Toilet Transfer, [place] contact guard assist;verbal cues required  -LM      Toilet Transfer, Assistive Device rolling walker  -LM      Transfer, Comment Vc's for hand placement.  Pt first transferred from bed-->BSC.  Pt able to complete pericare while PT provided CGA for standing balance.  Pt then transferred over to chair.  -LM  Vc's for hand placement  -LM     Recorded by [LM] Alia Blevins PT [LM] Alia Blevins PT     Gait Assessment/Treatment    Gait, [place] Level not appropriate to assess  -LM contact guard assist;verbal cues required  -LM     Gait, Assistive Device  rolling walker  -LM     Gait, Distance (Feet)  60  -LM     Gait, Gait Deviations  zunilda decreased;forward flexed posture  -LM     Gait, Safety Issues  supplemental O2  -LM     Gait, Impairments  strength decreased;impaired balance  -LM     Gait, Comment  VC's to stay inside walker and for upright posture.  O2 dropped to 78% s/p amb - recovered within 3 minutes of sitting and PLB.  -LM     Recorded by [LM] Alia Blevins PT [LM] Alia Blevins PT     Stairs Assessment/Treatment    Stairs, [place] Level not tested  -LM not tested  -LM     Recorded by [LM] Alia Blevins PT [LM] Alia Blevins PT     Motor Skills/Interventions    Additional Documentation Balance Skills Training (Group)  -LM Balance Skills Training (Group)  -LM     Recorded by [LM] Alia Blevins PT [LM] Alia Blevins PT     Balance Skills Training    Sitting-Level of Assistance Distant supervision  -LM Distant supervision  -LM     Sitting-Balance Support Feet supported  -LM Feet supported  -LM     Standing-Level of Assistance Contact guard  -LM Contact guard  -LM     Static Standing Balance Support assistive device  -LM assistive device  -LM     Gait Balance-Level of Assistance Contact guard  -LM Contact guard  -LM     Gait Balance Support assistive device  -LM assistive device  -LM     Recorded by [LM] Alia Blevins PT [LM] Alia Blevins PT     Therapy Exercises    Bilateral Lower Extremities  AROM:;10 reps;sitting;ankle pumps/circles;hip flexion;LAQ;hip abduction/adduction  -LM     Recorded by  [LM] Alia Blevins PT     Positioning and Restraints    Pre-Treatment Position in bed  -LM in bed  -LM     Post Treatment Position chair  -LM chair  -LM     In Chair reclined;call light within reach;encouraged  to call for assist;exit alarm on;waffle cushion;on mechanical lift sling;notified nsg  -LM reclined;call light within reach;encouraged to call for assist;exit alarm on;waffle cushion;on mechanical lift sling;notified nsg  -LM     Recorded by [LM] Alia Blevins, PT [LM] Alia Blevins, PT       User Key  (r) = Recorded By, (t) = Taken By, (c) = Cosigned By    Initials Name Effective Dates    LM Alia Blevins, PT 06/15/16 -                 IP PT Goals       01/11/18 0833 01/09/18 0954 01/07/18 1405    Bed Mobility PT STG    Bed Mobility PT STG, Date Established   01/07/18  -SR    Bed Mobility PT STG, Time to Achieve   5 days  -SR    Bed Mobility PT STG, Activity Type   all bed mobility  -SR    Bed Mobility PT STG, [place] Level   independent  -SR    Bed Mobility PT STG, Outcome goal ongoing  -LM goal ongoing  -LM     Transfer Training PT LTG    Transfer Training PT LTG, Date Established   01/07/18  -SR    Transfer Training PT LTG, Time to Achieve   by discharge  -SR    Transfer Training PT LTG, Activity Type   all transfers  -SR    Transfer Training PT LTG, [place] Level   conditional independence  -SR    Transfer Training PT LTG, Assist Device   walker, rolling  -SR    Transfer Training PT LTG, Outcome goal ongoing  -LM goal ongoing  -LM     Gait Training PT LTG    Gait Training Goal PT LTG, Date Established   01/07/18  -SR    Gait Training Goal PT LTG, Time to Achieve   by discharge  -SR    Gait Training Goal PT LTG, [place] Level   conditional independence  -SR    Gait Training Goal PT LTG, Assist Device   walker, rolling  -SR    Gait Training Goal PT LTG, Distance to Achieve   100  -SR    Gait Training Goal PT LTG, Outcome goal ongoing  -LM goal ongoing  -LM     Cardiopulmonary PT LTG    Cardiopulmonary PT LTG, Date Established   01/07/18  -SR    Cardiopulmonary PT LTG, Time to Achieve   by discharge  -SR    Cardiopulmonary PT LTG, Additional Goal   Pt able to maintain O2 sats at or above 85% during  mobility.  -SR    Cardiopulmonary PT LTG, Outcome goal ongoing  -LM goal ongoing  -LM       User Key  (r) = Recorded By, (t) = Taken By, (c) = Cosigned By    Initials Name Provider Type    SR Arcelia Lozano, PT Physical Therapist    LM Alia Blevins, PT Physical Therapist          Physical Therapy Education     Title: PT OT SLP Therapies (Active)     Topic: Physical Therapy (Done)     Point: Mobility training (Done)    Learning Progress Summary    Learner Readiness Method Response Comment Documented by Status   Patient Acceptance E VU,NR  LM 01/11/18 0914 Done    Acceptance E VU,NR  LM 01/09/18 1050 Done    Acceptance E DU,NR PT encourages pt to complete BLE ther ex as able to improve strength.  01/07/18 1404 Done               Point: Home exercise program (Done)    Learning Progress Summary    Learner Readiness Method Response Comment Documented by Status   Patient Acceptance E DU,NR PT encourages pt to complete BLE ther ex as able to improve strength.  01/07/18 1404 Done               Point: Body mechanics (Done)    Learning Progress Summary    Learner Readiness Method Response Comment Documented by Status   Patient Acceptance E DU,NR PT encourages pt to complete BLE ther ex as able to improve strength.  01/07/18 1404 Done               Point: Precautions (Done)    Learning Progress Summary    Learner Readiness Method Response Comment Documented by Status   Patient Acceptance E VU,NR  LM 01/11/18 0914 Done    Acceptance E VU,NR  LM 01/09/18 1050 Done    Acceptance E DU,NR PT encourages pt to complete BLE ther ex as able to improve strength.  01/07/18 1404 Done                      User Key     Initials Effective Dates Name Provider Type Discipline    SR 06/19/15 -  Arcelia Lozano, PT Physical Therapist PT     06/15/16 -  Alia Blevins PT Physical Therapist PT                    PT Recommendation and Plan  Anticipated Equipment Needs At Discharge: rollator (pt wears O2 and freq needs to sit d/t poor  endurance)  Anticipated Discharge Disposition: inpatient rehabilitation facility  PT Frequency: daily, per priority policy  Plan of Care Review  Plan Of Care Reviewed With: patient  Outcome Summary/Follow up Plan: Pt transferred sup-->sit with SBA, stood with CGA, and completed 2 transfers with CGAx1 using rw.  Unable to ambulate this tx session 2* to elevated BP.  In supine - 175/118; Sitting EOB - 172/113; s/p transfer to chair - 179/110.  RN notified and gave BP meds.  Pt will continue to benefit from skilled PT to improve mobility and safety prior to d/c.          Outcome Measures       01/11/18 0833          How much help from another person do you currently need...    Turning from your back to your side while in flat bed without using bedrails? 4  -LM      Moving from lying on back to sitting on the side of a flat bed without bedrails? 3  -LM      Moving to and from a bed to a chair (including a wheelchair)? 3  -LM      Standing up from a chair using your arms (e.g., wheelchair, bedside chair)? 3  -LM      Climbing 3-5 steps with a railing? 2  -LM      To walk in hospital room? 3  -LM      AM-PAC 6 Clicks Score 18  -LM      Functional Assessment    Outcome Measure Options AM-PAC 6 Clicks Basic Mobility (PT)  -LM        User Key  (r) = Recorded By, (t) = Taken By, (c) = Cosigned By    Initials Name Provider Type    DORY Blevins PT Physical Therapist           Time Calculation:         PT Charges       01/11/18 0833          Time Calculation    Start Time 0833  -LM      PT Received On 01/11/18  -LM      PT Goal Re-Cert Due Date 01/17/18  -LM      Time Calculation- PT    Total Timed Code Minutes- PT 35 minute(s)  -LM        User Key  (r) = Recorded By, (t) = Taken By, (c) = Cosigned By    Initials Name Provider Type    DORY Blevins PT Physical Therapist          Therapy Charges for Today     Code Description Service Date Service Provider Modifiers Qty    [id number]  PT THERAPEUTIC ACT EA 15 MIN  1/11/2018 Alia Blevins, PT GP 2          PT G-Codes  Outcome Measure Options: AM-PAC 6 Clicks Basic Mobility (PT)    Alia Blevins, PT  1/11/2018

## 2018-01-11 NOTE — PLAN OF CARE
Problem: Patient Care Overview (Adult)  Goal: Plan of Care Review  Outcome: Ongoing (interventions implemented as appropriate)   01/11/18 1322   Coping/Psychosocial Response Interventions   Plan Of Care Reviewed With patient   Patient Care Overview   Progress improving   Outcome Evaluation   Outcome Summary/Follow up Plan Pt engaged in education/training regarding adaptive breathing with exertion and use of incentive spirometer to increase lung function and manage SOA. Continue per OT POC. Recommend IP rehab at discharge.       Problem: Inpatient Occupational Therapy  Goal: Transfer Training Goal 1 LTG- OT  Outcome: Ongoing (interventions implemented as appropriate)   01/11/18 1322   Transfer Training OT LTG   Transfer Training OT LTG, Outcome goal ongoing     Goal: Patient Education Goal LTG- OT  Outcome: Outcome(s) achieved Date Met: 01/11/18 01/07/18 1401 01/11/18 1322   Patient Education OT LTG   Patient Education OT LTG, Date Established 01/07/18 --    Patient Education OT LTG, Time to Achieve 1 wk --    Patient Education OT LTG, Education Type HEP;adaptive breathing;energy conservation --    Patient Education OT LTG, Education Understanding verbalizes understanding --    Patient Education OT LTG Outcome --  goal met     Goal: Toileting Goal LTG- OT  Outcome: Ongoing (interventions implemented as appropriate)   01/07/18 1401 01/11/18 1322   Toileting OT LTG   Toileting Goal OT LTG, Date Established 01/07/18 --    Toileting Goal OT LTG, Time to Achieve 1 wk --    Toileting Goal OT LTG, [place] Level supervision required --    Toileting Goal OT LTG, Outcome --  goal ongoing     Goal: LB Dressing Goal LTG- OT  Outcome: Ongoing (interventions implemented as appropriate)   01/07/18 1401 01/11/18 1322   LB Dressing OT LTG   LB Dressing Goal OT LTG, Date Established 01/07/18 --    LB Dressing Goal OT LTG, Time to Achieve 1 wk --    LB Dressing Goal OT LTG, [place] Level minimum assist (75% patient  effort);verbal cues required  (don/doff socks) --    LB Dressing Goal OT LTG, Outcome --  goal ongoing

## 2018-01-11 NOTE — PROGRESS NOTES
Notified today that patient had PAF on 1/9/17. Patient has a CHADSVASC score of 4. Also with her severe pulmonary disease has high likelihood of recurrence. Recommend starting Eliquis 5 mg BID.    Marina Truong, IRVING

## 2018-01-11 NOTE — PROGRESS NOTES
Continued Stay Note  [facility]     Patient Name: Virgie Arroyo  MRN: [id number]  Today's Date: 1/11/2018    Admit Date: 1/4/2018          Discharge Plan       01/11/18 1250    Case Management/Social Work Plan    Plan [facility] unit    Patient/Family In Agreement With Plan yes    Additional Comments Pt medically ready to transfer per MD. Nurse to call report to [facility] at [phone] and fax transfer summary to [phone]. Spouse will transport. CM will cont to follow.       01/11/18 1125    Case Management/Social Work Plan    Plan Rehab    Patient/Family In Agreement With Plan yes    Additional Comments Spoke to Shaunna at [facility] and they have a bed on subacute today if pt is medically ready. Her spouse is at bs and can transport her and has pt's portable in his car. Dr. Malik will assess pt for medical readiness.               Discharge Codes     None        Expected Discharge Date and Time     Expected Discharge Date Expected Discharge Time    Jan 9, 2018             Holly Mathew

## 2018-01-11 NOTE — PROGRESS NOTES
Continued Stay Note  [facility]     Patient Name: Virgie Arroyo  MRN: [id number]  Today's Date: 1/11/2018    Admit Date: 1/4/2018          Discharge Plan       01/11/18 0910    Case Management/Social Work Plan    Plan Rehab    Patient/Family In Agreement With Plan other (see comments)    Additional Comments Received call from Shaunna at [facility] that pt does not have a bed today and will not have a bed on subacute until Wednesday 1/17 next week. She will call  if a bed becomes available sooner. Will discuss with pt other rehab options.               Discharge Codes     None        Expected Discharge Date and Time     Expected Discharge Date Expected Discharge Time    Jan 9, 2018             Holly Mathew

## 2018-01-11 NOTE — PLAN OF CARE
Problem: Patient Care Overview (Adult)  Goal: Plan of Care Review  Outcome: Ongoing (interventions implemented as appropriate)   01/11/18 0833   Coping/Psychosocial Response Interventions   Plan Of Care Reviewed With patient   Outcome Evaluation   Outcome Summary/Follow up Plan Pt transferred sup-->sit with SBA, stood with CGA, and completed 2 transfers with CGAx1 using rw. Unable to ambulate this tx session 2* to elevated BP. In supine - 175/118; Sitting EOB - 172/113; s/p transfer to chair - 179/110. RN notified and gave BP meds. Pt will continue to benefit from skilled PT to improve mobility and safety prior to d/c.       Problem: Inpatient Physical Therapy  Goal: Bed Mobility Goal STG- PT  Outcome: Ongoing (interventions implemented as appropriate)   01/07/18 1405 01/11/18 0833   Bed Mobility PT STG   Bed Mobility PT STG, Date Established 01/07/18 --    Bed Mobility PT STG, Time to Achieve 5 days --    Bed Mobility PT STG, Activity Type all bed mobility --    Bed Mobility PT STG, [place] Level independent --    Bed Mobility PT STG, Outcome --  goal ongoing     Goal: Transfer Training Goal 1 LTG- PT  Outcome: Ongoing (interventions implemented as appropriate)   01/07/18 1405 01/11/18 0833   Transfer Training PT LTG   Transfer Training PT LTG, Date Established 01/07/18 --    Transfer Training PT LTG, Time to Achieve by discharge --    Transfer Training PT LTG, Activity Type all transfers --    Transfer Training PT LTG, [place] Level conditional independence --    Transfer Training PT LTG, Assist Device walker, rolling --    Transfer Training PT LTG, Outcome --  goal ongoing     Goal: Gait Training Goal LTG- PT  Outcome: Ongoing (interventions implemented as appropriate)   01/07/18 1405 01/11/18 0833   Gait Training PT LTG   Gait Training Goal PT LTG, Date Established 01/07/18 --    Gait Training Goal PT LTG, Time to Achieve by discharge --    Gait Training Goal PT LTG, [place] Level conditional  independence --    Gait Training Goal PT LTG, Assist Device walker, rolling --    Gait Training Goal PT LTG, Distance to Achieve 100 --    Gait Training Goal PT LTG, Outcome --  goal ongoing     Goal: Cardiopulmonary Goal LTG- PT  Outcome: Ongoing (interventions implemented as appropriate)   01/07/18 1405 01/11/18 0833   Cardiopulmonary PT LTG   Cardiopulmonary PT LTG, Date Established 01/07/18 --    Cardiopulmonary PT LTG, Time to Achieve by discharge --    Cardiopulmonary PT LTG, Additional Goal Pt able to maintain O2 sats at or above 85% during mobility. --    Cardiopulmonary PT LTG, Outcome --  goal ongoing

## 2018-01-11 NOTE — PROGRESS NOTES
"Adult Nutrition  Assessment/PES    Patient Name:  Virgie Arroyo  YOB: 1945  MRN: [id number]  Admit Date:  1/4/2018    Assessment Date:  1/11/2018            Reason for Assessment       01/11/18 1057    Reason for Assessment    Time Spent (min) 20    Diagnosis --   per notes this admission    Other diagnosis sepsis, PNA                Anthropometrics       01/11/18 1058    Anthropometrics    Height 152.4 cm (60\")    Weight 82.1 kg (181 lb)   standing scale wt per nsg documentation 1/10    Ideal Body Weight (IBW)    Ideal Body Weight (IBW), Female 46.26    % Ideal Body Weight 177.85    Body Mass Index (BMI)    BMI (kg/m2) 35.42            Labs/Tests/Procedures/Meds       01/11/18 1058    Labs/Tests/Procedures/Meds    Labs/Tests Review Reviewed    Medication Review Reviewed, pertinent                Nutrition Prescription Ordered       01/11/18 1058    Nutrition Prescription PO    Current PO Diet Regular    Fluid Consistency Thin    Common Modifiers Cardiac;Consistent Carbohydrate            Evaluation of Received Nutrient/Fluid Intake       01/11/18 1059    PO Evaluation    Number of Meals 6    % PO Intake 67            Problem/Interventions:        Problem 1       01/11/18 1059    Nutrition Diagnoses Problem 1    Problem 1 No Nutrition Diagnosis at this Time    Etiology (related to) --   clinical condition    Signs/Symptoms (evidenced by) PO Intake    Percent (%) intake recorded 67 %    Over number of meals 6                    Intervention Goal       01/11/18 1059    Intervention Goal    General Nutrition support treatment    PO Maintain intake                Education/Evaluation       01/11/18 1059    Monitor/Evaluation    Monitor Per protocol;PO intake        Electronically signed by:  Tova Wu RD  01/11/18 10:59 AM  "

## 2018-01-11 NOTE — DISCHARGE SUMMARY
[facility] Medicine Services  DISCHARGE SUMMARY    Patient Name: Virgie Arroyo  : 1945  MRN: [id number]    Date of Admission: 2018  Date of Discharge: 2018  Primary Care Physician: Nick Mcginnis MD    Consults     Date and Time Order Name Status Description    2018 2211 Inpatient Consult to Cardiology      2018 0919 Inpatient Consult to Cardiology      2017 2141 Inpatient Consult to Pulmonology Completed         Hospital Course     Presenting Problem:   Elevated troponin [R74.8]  Elevated troponin [R74.8]    Active Hospital Problems (** Indicates Principal Problem)    Diagnosis Date Noted   • **Sepsis [A41.9] 2018   • Atrial fibrillation with RVR [I48.91] 2018   • Pneumonia [J18.9] 2018   • Elevated troponin [R74.8] 2018   • Syncope and collapse on admission due to hypotension of sepsis [R55] 2018   • Mixed Respiratory failure, acute-on-chronic [J96.20] 2017   • HLD (hyperlipidemia) [E78.5]    • GERD (gastroesophageal reflux disease) [K21.9]    • Severe COPD on home O2 baseline 4 to 6 Liters [J44.9]    • Chronic diastolic congestive heart failure [I50.32]    • Depression with anxiety [F41.8]    • Secondary pulmonary arterial hypertension due to COPD/ELIZABETH [I27.21] 2016   • Hypothyroidism [E03.9] 2016   • Hypertension [I10] 2016   • Dependence on supplemental oxygen [Z99.81] 2016      Resolved Hospital Problems    Diagnosis Date Noted Date Resolved   No resolved problems to display.        Hospital Course:  Virgie Arroyo is a 72 y.o. female w/ h/o HTN, HLD, Diastolic HF, COPD on home O2, ELIZABETH on CPAP, recent admission to [facility]  to  for AECOPD, was transferred to [facility].  Brought to [facility] ED on 2018 after witnessed syncopal event.  There was report of a fever to 101.6 and in the ED developed sudden hypotension, worsening hypoxemia and lethargy and was found unresponsive in  bed.  She was admitted to the ICU and improved with fluid resuscitation as well as BiPap. Initial chest x-ray was indeterminant but after appropriate hydration repeat showed obvious RML/RLL pneumonia. She is being discharged with Augmentin to [facility] up a 7 days course of abx. Her BPs are returning to her hypertensive baseline we restarted her home antihypertensives for which she will continue at discharge. On 1/8/2018 she went into afib with rvr, started on IV Cardizem and heparin gtt, cardiology was consulted ok to continue BB and she has been started to Eliquis as her [id number] score was 4 rec to d/c aspirin. PT/OT saw patient and they recommended inpatient rehaB, hence this d/c to [facility].     01/08 2240 Note By: Ashley Ramos,    Day of Discharge     HPI: No acute events overnight, patient states that she had  a good night, breathing is better, denies any CP, palpitation, no fevers or chills    Review of Systems  Gen- No fevers, chills  CV- No chest pain, palpitations  Resp- No cough, dyspnea  GI- No N/V/D, abd pain    Otherwise ROS is negative except as mentioned in the HPI.    Vital Signs:   Temp:  [97.4 °F (36.3 °C)-98.8 °F (37.1 °C)] 98.5 °F (36.9 °C)  Heart Rate:  [] 89  Resp:  [16-28] 16  BP: (130-175)/() 139/92     Physical Exam:  Constitutional: No acute distress, awake, alert, seated in chair  HENT: NCAT, mucous membranes moist  Respiratory: Clear to auscultation bilaterally, respiratory effort normal   Cardiovascular: RRR, no murmurs, rubs, or gallops, palpable pedal pulses bilaterally  Gastrointestinal: Positive bowel sounds, soft, nontender, nondistended  Musculoskeletal: No bilateral ankle edema  Psychiatric: Appropriate affect, cooperative  Neurologic: Oriented x 3, strength symmetric in all extremities, Cranial Nerves grossly intact to confrontation, speech clear  Skin: No rashes    Pertinent  and/or Most Recent Results       Results from last 7 days  Lab Units 01/09/18  5357  01/08/18  2148 01/07/18  0814 01/06/18  0353 01/05/18  0646 01/05/18  0430 01/04/18 2028   WBC 10*3/mm3  --  6.99 7.57 10.14  --  12.24* 8.45   HEMOGLOBIN g/dL  --  10.8* 10.1* 9.8*  --  11.0* 10.1*   HEMATOCRIT %  --  33.6* 31.1* 30.4*  --  34.0* 31.4*   PLATELETS 10*3/mm3  --  223 212 226  --  175 228   SODIUM mmol/L  --  134 134 133 133  --  131*   POTASSIUM mmol/L 3.8 3.9 4.1 3.7 4.5  --  4.7   CHLORIDE mmol/L  --  95* 96* 96* 93*  --  92*   CO2 mmol/L  --  30.0 32.0* 31.0 31.0  --  32.0*   BUN mg/dL  --  16 14 17 20  --  20   CREATININE mg/dL  --  0.70 0.70 0.60 0.80  --  1.20   GLUCOSE mg/dL  --  127* 116* 122* 107*  --  123*   CALCIUM mg/dL  --  9.2 8.9 8.5* 8.2*  --  8.3*       Results from last 7 days  Lab Units 01/11/18  0838 01/11/18  0132 01/10/18  1957 01/10/18  1147 01/10/18  0316 01/09/18  2225 01/09/18  1429  01/08/18  2148 01/07/18  0814 01/05/18  0646 01/04/18 2028   BILIRUBIN mg/dL  --   --   --   --   --   --   --   --  0.5 0.6 1.0 0.9   ALK PHOS U/L  --   --   --   --   --   --   --   --  88 76 73 50   ALT (SGPT) U/L  --   --   --   --   --   --   --   --  52* 34 32 31   AST (SGOT) U/L  --   --   --   --   --   --   --   --  43* 30 31 20   PROTIME Seconds  --   --   --   --   --   --   --   --  9.8  --   --   --    INR   --   --   --   --   --   --   --   --  0.90  --   --   --    APTT seconds 42.0* 56.2 51.3 43.1* 66.7* 56.5 44.7*  < > 31.5*  --   --   --    < > = values in this interval not displayed.        Invalid input(s): TG, LDLREALC    Results from last 7 days  Lab Units 01/11/18  1200 01/11/18  0838 01/11/18  0347 01/10/18  2357 01/10/18  1957  01/09/18  0623  01/08/18  2148  01/05/18  0430 01/04/18  2258   TSH mIU/mL  --   --   --   --   --   --  0.164*  --  0.302*  --   --   --    BNP pg/mL  --   --   --   --   --   --   --   --  411.0*  --  261.0* 370.0*   TROPONIN I ng/mL 0.312* 0.340* 0.354* 0.330* 0.378*  < >  --   < > 0.596*  < >  --   --    < > = values in this interval not  displayed.  Brief Urine Lab Results  (Last result in the past 365 days)      Color   Clarity   Blood   Leuk Est   Nitrite   Protein   CREAT   Urine HCG        01/04/18 1356 Yellow Clear Negative Negative Negative Negative               Microbiology Results Abnormal     Procedure Component Value - Date/Time    Blood Culture - Blood, [[id number]]  (Normal) Collected:  01/04/18 1440    Lab Status:  Final result Specimen:  Blood from Arm, Right Updated:  01/09/18 1516     Blood Culture No growth at 5 days    Blood Culture - Blood, [[id number]]  (Normal) Collected:  01/04/18 1445    Lab Status:  Final result Specimen:  Blood from Hand, Left Updated:  01/09/18 1516     Blood Culture No growth at 5 days    Respiratory Panel, PCR - Swab, Nasopharynx [[id number]] Collected:  01/05/18 0609    Lab Status:  Final result Specimen:  Swab from Nasopharynx Updated:  01/07/18 0640     Adenovirus Detection by PCR Not Detected     Coronavirus HKU1 Not Detected     Coronavirus NL63 Not Detected     Coronavirus 229E Not Detected     Coronavirus OC43 Not Detected     Human Metapneumovirus Not Detected     Human Rhinovirus/Enterovirus Not Detected     Influenza A PCR Not Detected     Influenza A H1 Not Detected     Influenza 2009 H1N1 by PCR Not Detected     Influenza A H3 Not Detected     Influenza B PCR Not Detected     Parainfluenza Virus 1 Not Detected     Parainfluenza Virus 2 Not Detected     Parainfluenza Virus 3 Not Detected     Parainfluenza Virus 4 Not Detected     Respiratory Syncytial Virus Not Detected     Bordetella pertussis pcr Not Detected     Chlamydophila pneumoniae PCR Not Detected     Mycoplasma pneumo by PCR Not Detected    Narrative:       Performed at:  [address] [place] [address]  [id number]  : Jose Alfredo Cody MD, Phone:  [phone]    Influenza A & B, RT PCR - Swab, Nasopharynx [[id number]]  (Normal) Collected:  01/06/18 1623    Lab Status:  Final result Specimen:  Swab from  Nasopharynx Updated:  01/06/18 1736     Influenza A PCR Not Detected     Influenza B PCR Not Detected    Influenza A & B, RT PCR - Swab, Nasopharynx [[id number]]  (Normal) Collected:  01/04/18 1401    Lab Status:  Final result Specimen:  Swab from Nasopharynx Updated:  01/04/18 1503     Influenza A PCR Not Detected     Influenza B PCR Not Detected          Imaging Results (all)     Procedure Component Value Units Date/Time    XR Chest 1 View [[id number]] Collected:  01/04/18 0639     Updated:  01/04/18 0818    Narrative:       EXAM:    XR Chest, 1 View    CLINICAL HISTORY:    72 years old, female; Pain; Chest pain; Other: General chest pain; Patient   HX: Chest pain triage protocol HX: Hypertension hypothyroidism dependence on   supplemental oxygen secondary pulmonary arterial hypertension copd, severe   depression with anxiety diastolic congestive heart failure gerd   (gastroesophageal reflux disease) hld (hyperlipidemia) copd with exacerbation   acute cystitis without hematuria acute respiratory failure with hypoxia chronic   hyponatremia    TECHNIQUE:    Frontal view of the chest.    COMPARISON:    CR - XR CHEST PA AND LATERAL 2017-12-21 12:22    FINDINGS:    Mild cardiomegaly, similar to prior study. Thoracic aorta is mildly tortuous.   Mild patchy airspace opacities (atelectasis and/or consolidation), decreased   from prior study. No visible pneumothorax or pleural effusion. Mild pulmonary   vascular congestion, similar to prior study.      Impression:       1. Mild patchy airspace opacities (atelectasis and/or consolidation), decreased   from prior study.  2. Mild pulmonary vascular congestion, similar to prior study.    THIS DOCUMENT HAS BEEN ELECTRONICALLY SIGNED BY KEDAR BREWER MD    CT Head Without Contrast [[id number]] Collected:  01/04/18 0813     Updated:  01/04/18 1008    Narrative:       EXAMINATION: CT HEAD WO CONTRAST-      INDICATION: Syncope.      TECHNIQUE: Axial CT of the head without intravenous  contrast  administration.     The radiation dose reduction device was turned on for each scan per the  ALARA (As Low as Reasonably Achievable) protocol.     COMPARISON: None.     FINDINGS: Midline structures are symmetric without evidence of mass,  mass effect or midline shift. No intraaxial hemorrhage or extraaxial  fluid collection. Ventricles and sulci are prominent with noted  hypoplastic or aplastic rostral portion of the corpus callosum  concerning for partial congenital absence. Moderate attenuation changes  within the periventricular white matter consistent with chronic small  vessel ischemic disease. Globes and orbits unremarkable. Visualized  paranasal sinuses and mastoid air cells are grossly clear and well  pneumatized. Calvarium intact.       Impression:       1. No acute intracranial abnormality.  2. Moderate chronic small vessel ischemic changes and changes of atrophy  of the supratentorial white matter.  3. 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     D:  01/04/2018  E:  01/04/2018     This report was finalized on 1/4/2018 10:06 AM by Dr. Quirino Garcia.       XR Chest 1 View [[id number]] Collected:  01/04/18 2145     Updated:  01/04/18 2335    Narrative:       EXAM:    XR Chest, 1 View    CLINICAL HISTORY:    72 years old, female; Essential (primary) hypertension; Chronic obstructive   pulmonary disease, unspecified; Syncope and collapse; Abnormal levels of other   serum enzymes; Signs and symptoms; Other: Hypoxemia    TECHNIQUE:    Frontal view of the chest.    COMPARISON:    CR - XR CHEST 1 VW 2018-01-04 06:46    FINDINGS:    Lungs:  Patchy airspace disease at the left base, atelectasis and/or   pneumonia, appears stable.  Apparent increased airspace disease in the right   lower lobe is likely secondary to patient rotation.    Pleural space:  Unremarkable.  No pneumothorax.    Heart:  Unremarkable.  No  cardiomegaly.    Mediastinum:  Unremarkable.    Bones/joints:  Unremarkable.    Other findings:  The patient is rotated to the left.      Impression:       1.  Patchy airspace disease at the left base, atelectasis and/or pneumonia,   appears stable.  2.  Apparent increased airspace disease in the right lower lobe is likely   secondary to patient rotation.    THIS DOCUMENT HAS BEEN ELECTRONICALLY SIGNED BY ESCOBAR TOBIAS MD    XR Chest 1 View [[id number]] Collected:  01/05/18 0826     Updated:  01/05/18 0857    Narrative:       EXAMINATION: XR CHEST 1 VW-      INDICATION: Followup pulmonary edema vs pneumonia; R74.8-Abnormal levels  of other serum enzymes; R55-Syncope and collapse; J44.9-Chronic  obstructive pulmonary disease, unspecified; I10-Essential (primary)  hypertension.      COMPARISON: 01/04/2018.     FINDINGS: Portable chest reveals the cardiac silhouette to be enlarged.  There is prominence of the pulmonary vascularity bilaterally  superimposed on chronic and emphysematous change. Degenerative change is  seen within the spine. No focal parenchymal opacification is present. No  definite pleural effusion. No pneumothorax.           Impression:       Some prominence of the pulmonary vascularity superimposed on  chronic and emphysematous change. No focal parenchymal opacification  present.     D:  01/05/2018  E:  01/05/2018     This report was finalized on 1/5/2018 8:55 AM by Dr. Ashley Nova MD.       XR Chest 1 View [[id number]] Collected:  01/06/18 1337     Updated:  01/06/18 1423    Narrative:          EXAMINATION: XR CHEST 1 VW - 1/6/2018     INDICATION: R74.8-Abnormal levels of other serum enzymes; R55-Syncope  and collapse; J44.9-Chronic obstructive pulmonary disease, unspecified;  I10-Essential (primary) hypertension.      COMPARISON: 1/5/2018.     FINDINGS: The heart is large. There is slightly increasing airspace  disease in the right lung base medially. There is no consolidation, mass  or  effusion.           Impression:       Increasing airspace disease at the right lung base medially  when compared with 1/5/2018.     DICTATED:     1/6/2018  EDITED    :     1/6/2018      This report was finalized on 1/6/2018 2:21 PM by Dr. Manolo Vasquez MD.       XR Chest 1 View [[id number]] Collected:  01/08/18 2140     Updated:  01/08/18 0518    Narrative:       EXAM:    XR Chest, 1 View    CLINICAL HISTORY:    72 years old, female; Essential (primary) hypertension; Chronic obstructive   pulmonary disease, unspecified; Syncope and collapse; Abnormal levels of other   serum enzymes; Other reduced mobility; Pain; Chest pain; Type not specified    TECHNIQUE:    Frontal view of the chest.    COMPARISON:    CR - XR CHEST 1 VW 2018-01-06 03:46    FINDINGS:    Lungs:  Mild chronic hyperinflation at right apex.  Patient has history of   COPD.  No acute infiltrates, allowing for obesity which obscures some   resolution.  No pneumonia or CHF.  Mild biapical pleural scarring.    Pleural space:  No significant pleural effusion.  Slight focal thickening   along superior aspect of the right major fissure versus trace interfissural   fluid.  No pneumothorax.    Heart:  Mild cardiomegaly.    Mediastinum: No acute abnormality compared to prior exam.    Bones/joints:  Thoracolumbar scoliosis.  Degenerative changes of the spine.    Mild DJD of bilateral shoulders.      Impression:       1.  Mild cardiomegaly.  2.  COPD.  3.  No pneumonia or CHF.    THIS DOCUMENT HAS BEEN ELECTRONICALLY SIGNED BY JESS GREENE MD          Results for orders placed during the hospital encounter of 12/20/17   Adult Transthoracic Echo Complete W/ Cont if Necessary Per Protocol    Narrative · Left ventricular systolic function is normal.  · Estimated EF appears to be in the range of 66 - 70%.  · Left ventricular diastolic dysfunction (grade I) consistent with   impaired relaxation.  · Left ventricular wall thickness is consistent with mild concentric    hypertrophy.  · Mild aortic valve regurgitation is present.  · Mild to moderate tricuspid valve regurgitation is present.  · Estimated right ventricular systolic pressure from tricuspid   regurgitation is moderately elevated (45-55 mmHg).            Discharge Details      Virgie Arroyo   Home Medication Instructions TORRES:[id number]    Printed on:01/11/18 4601   Medication Information                      aluminum-magnesium hydroxide-simethicone (MAALOX/MYLANTA) 200-200-20 MG/5ML suspension  Take 30 mL by mouth Every 6 (Six) Hours As Needed for Indigestion or Heartburn.             amoxicillin-clavulanate (AUGMENTIN) 875-125 MG per tablet  Take 1 tablet by mouth Every 12 (Twelve) Hours for 5 doses. Indications: Pneumonia             apixaban (ELIQUIS) 5 MG tablet tablet  Take 1 tablet by mouth Every 12 (Twelve) Hours.             busPIRone (BUSPAR) 10 MG tablet  Take 10 mg by mouth 3 (three) times a day.             diphenhydrAMINE (BENADRYL) 25 mg capsule  Take 25 mg by mouth At Night As Needed for Sleep.             Fluticasone Furoate-Vilanterol (BREO ELLIPTA) 100-25 MCG/INH aerosol powder   Inhale 1 puff Daily.             furosemide (LASIX) 40 MG tablet  Take 40 mg by mouth Daily.             gabapentin (NEURONTIN) 800 MG tablet  Take 800 mg by mouth 3 (Three) Times a Day.             HYDROcodone-acetaminophen (NORCO) 5-325 MG per tablet  Take 1 tablet by mouth Every 6 (Six) Hours As Needed.             isosorbide mononitrate (IMDUR) 60 MG 24 hr tablet  Take 60 mg by mouth Daily.             levothyroxine (SYNTHROID, LEVOTHROID) 50 MCG tablet  Take 50 mcg by mouth daily.             loratadine (CLARITIN) 10 MG tablet  Take 10 mg by mouth Daily.             LORazepam (ATIVAN) 1 MG tablet  Take 1 mg by mouth 2 (Two) Times a Day.             losartan (COZAAR) 100 MG tablet  Take 100 mg by mouth Every Morning.             losartan (COZAAR) 50 MG tablet  Take 50 mg by mouth Every Evening.             meloxicam  (MOBIC) 7.5 MG tablet  Take 7.5 mg by mouth 2 (Two) Times a Day.             metoprolol tartrate (LOPRESSOR) 25 MG tablet  Take 1 tablet by mouth Every 12 (Twelve) Hours.             montelukast (SINGULAIR) 10 MG tablet  Take 10 mg by mouth every night.             pantoprazole (PROTONIX) 40 MG EC tablet  Take 40 mg by mouth daily.             pravastatin (PRAVACHOL) 40 MG tablet  Take 40 mg by mouth daily.             predniSONE (DELTASONE) 10 MG tablet  40mg daily x 3 days, 20mg daily x 3 days, 10mg daily x 3 days,  then stop             probiotic (CULTURELLE) capsule capsule  Take 1 capsule by mouth Daily.             Umeclidinium Bromide 62.5 MCG/INH aerosol powder   Inhale 1 puff Daily. 1 inhalation once a day                 Discharge Disposition:    Skilled Nursing Facility (DC - External)  Discharge Diet:  Diet Instructions     Advance Diet As Tolerated                     Discharge Activity:   Activity Instructions     Activity as Tolerated                     Special Instructions: None    Future Appointments  Date Time Provider Department Center   1/17/2018 1:00 PM IRVING Lainez [facility] None       Additional Instructions for the Follow-ups that You Need to Schedule     Discharge Follow-up with PCP    As directed    Follow Up Details:  1 week                 Time Spent on Discharge:  40 minutes    Eleanor Malik MD  01/11/18  1:36 PM

## 2018-01-11 NOTE — PROGRESS NOTES
Continued Stay Note  [facility]     Patient Name: Virgie Arroyo  MRN: [id number]  Today's Date: 1/11/2018    Admit Date: 1/4/2018          Discharge Plan       01/11/18 1125    Case Management/Social Work Plan    Plan Rehab    Patient/Family In Agreement With Plan yes    Additional Comments Spoke to Shaunna at [facility] and they have a bed on subacute today if pt is medically ready. Her spouse is at  and can transport her and has pt's portable in his car. Dr. Malik will assess pt for medical readiness.       01/11/18 0910    Case Management/Social Work Plan    Plan Rehab    Patient/Family In Agreement With Plan other (see comments)    Additional Comments Received call from Shaunna at [facility] that pt does not have a bed today and will not have a bed on subacute until Wednesday 1/17 next week. She will call  if a bed becomes available sooner. Will discuss with pt other rehab options.               Discharge Codes     None        Expected Discharge Date and Time     Expected Discharge Date Expected Discharge Time    Jan 9, 2018             Holly Mathew

## 2018-02-02 ENCOUNTER — TELEPHONE (OUTPATIENT)
Dept: PULMONOLOGY | Facility: CLINIC | Age: 73
End: 2018-02-02

## 2018-02-02 NOTE — TELEPHONE ENCOUNTER
Jayashree from [facility] called today that Virgie was walking to the restroom and back (10 ft) and her O2 stats dropped to 67% and it took 4.5 minutes to bring them back up. She said she was very lightheaded and dizzy in the shower. She has an appointment with Gilda on 2/7/18. She was asked if she wanted to go to the ER but she doesn't want to. If you have any questions please call Jayashree at [phone].

## 2018-02-07 ENCOUNTER — OFFICE VISIT (OUTPATIENT)
Dept: PULMONOLOGY | Facility: CLINIC | Age: 73
End: 2018-02-07

## 2018-02-07 VITALS
HEART RATE: 67 BPM | RESPIRATION RATE: 16 BRPM | WEIGHT: 186.4 LBS | BODY MASS INDEX: 36.6 KG/M2 | DIASTOLIC BLOOD PRESSURE: 84 MMHG | TEMPERATURE: 96.7 F | HEIGHT: 60 IN | SYSTOLIC BLOOD PRESSURE: 160 MMHG | OXYGEN SATURATION: 91 %

## 2018-02-07 DIAGNOSIS — J44.9 COPD, SEVERE (HCC): ICD-10-CM

## 2018-02-07 DIAGNOSIS — J96.22 ACUTE ON CHRONIC RESPIRATORY FAILURE WITH HYPOXIA AND HYPERCAPNIA (HCC): ICD-10-CM

## 2018-02-07 DIAGNOSIS — I27.21 SECONDARY PULMONARY ARTERIAL HYPERTENSION (HCC): Primary | ICD-10-CM

## 2018-02-07 DIAGNOSIS — J96.21 ACUTE ON CHRONIC RESPIRATORY FAILURE WITH HYPOXIA AND HYPERCAPNIA (HCC): ICD-10-CM

## 2018-02-07 DIAGNOSIS — I50.32 CHRONIC DIASTOLIC CONGESTIVE HEART FAILURE (HCC): ICD-10-CM

## 2018-02-07 DIAGNOSIS — Z99.81 DEPENDENCE ON SUPPLEMENTAL OXYGEN: ICD-10-CM

## 2018-02-07 DIAGNOSIS — I48.91 ATRIAL FIBRILLATION WITH RVR (HCC): ICD-10-CM

## 2018-02-07 PROBLEM — R55 SYNCOPE AND COLLAPSE: Status: RESOLVED | Noted: 2018-01-04 | Resolved: 2018-02-07

## 2018-02-07 PROBLEM — A41.9 SEPSIS (HCC): Status: RESOLVED | Noted: 2018-01-07 | Resolved: 2018-02-07

## 2018-02-07 PROCEDURE — 99215 OFFICE O/P EST HI 40 MIN: CPT | Performed by: NURSE PRACTITIONER

## 2018-02-07 RX ORDER — ALBUTEROL SULFATE 2.5 MG/3ML
3 SOLUTION RESPIRATORY (INHALATION) 3 TIMES DAILY
COMMUNITY
Start: 2018-01-24

## 2018-02-07 RX ORDER — LEVOCETIRIZINE DIHYDROCHLORIDE 5 MG/1
1 TABLET, FILM COATED ORAL DAILY
COMMUNITY
Start: 2018-02-03

## 2018-02-07 RX ORDER — ALBUTEROL SULFATE 90 UG/1
2 AEROSOL, METERED RESPIRATORY (INHALATION) EVERY 4 HOURS PRN
COMMUNITY

## 2018-02-07 RX ORDER — SPIRONOLACTONE 50 MG/1
50 TABLET, FILM COATED ORAL DAILY
COMMUNITY

## 2018-02-07 RX ORDER — FUROSEMIDE 20 MG/1
1 TABLET ORAL DAILY
COMMUNITY
Start: 2018-01-25

## 2018-02-07 NOTE — PROGRESS NOTES
[facility] Pulmonary Follow up    CHIEF COMPLAINT    Post hospital follow-up    HISTORY OF PRESENT ILLNESS    Virgie Arroyo is a 72 y.o.female here today for recent hospital stay from January 4 to January 11 as well as December 20 to the 27th.  Initially she was admitted for acute respiratory failure with hypoxia and COPD exacerbation.  She completed a course of IV steroids, antibiotics and nebulizers.  She was transferred to [facility] for acute rehabilitation with CPAP at night.  The CPAP at night was new for her.  She was seen by pulmonary while in the hospital.  She completed a course of Augmentin and steroid taper.  She was readmitted on January 4 from [facility] with A. fib with rapid ventricular response with a syncopal event.  She did require BiPAP support with a right middle lobe right lower lobe pneumonia.  She was placed on a beta blocker and Eliquis twice a day for her paroxysmal A. fib.  She apparently only received her Eliquis yesterday and started taking it.    Overall she has been home for about 3 weeks and still complains of being very fatigued and weak.  She is dyspneic with minimal activity.  She has no cough or sputum production.  She denies any chest pain chest pressure palpitations.  She has very minimal lower extremity edema.  She is eating and drinking.  She denies any nausea vomiting or diarrhea.  She denies any fevers or chills.    She does have a history of severe COPD on home O2, pulmonary hypertension.  I saw her last in July, and Mrs. Figueroa saw her in October.  Chronically she is on Breo and Incruse with albuterol nebs 4 times a day.  She states she is able to inhaled the Breo and Incruse well, without difficulty.        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/ELIZABETH   • Severe COPD on home O2 baseline 4 to 6 Liters   • Depression with anxiety   •  Chronic diastolic congestive heart failure   • GERD (gastroesophageal reflux disease)   • HLD (hyperlipidemia)   • COPD with exacerbation   • Acute cystitis without hematuria   • Mixed Respiratory failure, acute-on-chronic   • Chronic hyponatremia   • Elevated troponin   • Pneumonia   • Atrial fibrillation with RVR       No Known Allergies    Current Outpatient Prescriptions:   •  albuterol (PROVENTIL HFA;VENTOLIN HFA) 108 (90 Base) MCG/ACT inhaler, Inhale 2 puffs Every 4 (Four) Hours As Needed for Wheezing., Disp: , Rfl:   •  albuterol (PROVENTIL) (2.5 MG/3ML) 0.083% nebulizer solution, Take 3 mL by nebulization 3 (Three) Times a Day., Disp: , Rfl:   •  apixaban (ELIQUIS) 5 MG tablet tablet, Take 1 tablet by mouth Every 12 (Twelve) Hours., Disp: 60 tablet, Rfl: 2  •  busPIRone (BUSPAR) 10 MG tablet, Take 10 mg by mouth 3 (three) times a day., Disp: , Rfl:   •  Fluticasone Furoate-Vilanterol (BREO ELLIPTA) 100-25 MCG/INH aerosol powder , Inhale 1 puff Daily., Disp: , Rfl:   •  furosemide (LASIX) 20 MG tablet, Take 1 tablet by mouth Daily., Disp: , Rfl:   •  gabapentin (NEURONTIN) 800 MG tablet, Take 800 mg by mouth 3 (Three) Times a Day., Disp: , Rfl:   •  isosorbide mononitrate (IMDUR) 60 MG 24 hr tablet, Take 60 mg by mouth Daily., Disp: , Rfl:   •  levocetirizine (XYZAL) 5 MG tablet, Take 1 tablet by mouth Daily., Disp: , Rfl:   •  levothyroxine (SYNTHROID, LEVOTHROID) 50 MCG tablet, Take 50 mcg by mouth daily., Disp: , Rfl:   •  LORazepam (ATIVAN) 1 MG tablet, Take 1 mg by mouth 2 (Two) Times a Day., Disp: , Rfl:   •  losartan (COZAAR) 50 MG tablet, Take 50 mg by mouth Every Evening., Disp: , Rfl:   •  meloxicam (MOBIC) 7.5 MG tablet, Take 7.5 mg by mouth 2 (Two) Times a Day., Disp: , Rfl:   •  metoprolol tartrate (LOPRESSOR) 25 MG tablet, Take 1 tablet by mouth Every 12 (Twelve) Hours., Disp: , Rfl:   •  montelukast (SINGULAIR) 10 MG tablet, Take 10 mg by mouth every night., Disp: , Rfl:   •  pantoprazole  "(PROTONIX) 40 MG EC tablet, Take 40 mg by mouth daily., Disp: , Rfl:   •  pravastatin (PRAVACHOL) 40 MG tablet, Take 40 mg by mouth daily., Disp: , Rfl:   •  predniSONE (DELTASONE) 10 MG tablet, 40mg daily x 3 days, 20mg daily x 3 days, 10mg daily x 3 days,  then stop, Disp: , Rfl:   •  probiotic (CULTURELLE) capsule capsule, Take 1 capsule by mouth Daily., Disp: , Rfl:   •  spironolactone (ALDACTONE) 50 MG tablet, Take 50 mg by mouth Daily., Disp: , Rfl:   •  Umeclidinium Bromide 62.5 MCG/INH aerosol powder , Inhale 1 puff Daily. 1 inhalation once a day, Disp: 1 each, Rfl: 5  MEDICATION LIST AND ALLERGIES REVIEWED.    Social History   Substance Use Topics   • Smoking status: Former Smoker     Packs/day: 2.00     Years: 30.00     Types: Cigarettes     Start date: 1972     Quit date: 2002   • Smokeless tobacco: Never Used   • Alcohol use 8.4 oz/week     14 Cans of beer per week       FAMILY AND SOCIAL HISTORY REVIEWED.    Review of Systems   Constitutional: Positive for activity change and fatigue. Negative for chills, fever and unexpected weight change.   HENT: Positive for congestion. Negative for nosebleeds, postnasal drip, rhinorrhea, sinus pressure and trouble swallowing.    Respiratory: Positive for cough and shortness of breath. Negative for chest tightness and wheezing.    Cardiovascular: Positive for leg swelling. Negative for chest pain and palpitations.   Gastrointestinal: Negative for abdominal pain, blood in stool, constipation, diarrhea, nausea and vomiting.   Genitourinary: Negative for dysuria, frequency, hematuria and urgency.   Musculoskeletal: Positive for gait problem (wheel chair). Negative for myalgias.   Neurological: Positive for weakness. Negative for dizziness, numbness and headaches.   All other systems reviewed and are negative.  .    /84  Pulse 67  Temp 96.7 °F (35.9 °C)  Resp 16  Ht 152.4 cm (60\")  Wt 84.6 kg (186 lb 6.4 oz)  SpO2 91% Comment: 3LPD  BMI 36.4 kg/m2  SpO2 " Readings from Last 3 Encounters:   02/07/18 91%   01/11/18 95%   12/27/17 96%       Physical Exam   Constitutional: She is oriented to person, place, and time. She appears well-developed and well-nourished.   Appears ill and fatigued     HENT:   Head: Normocephalic and atraumatic.   Eyes: EOM are normal. Pupils are equal, round, and reactive to light.   Neck: Normal range of motion. Neck supple.   Cardiovascular: Normal rate and regular rhythm.    No murmur heard.  Pulmonary/Chest: Effort normal. No respiratory distress. She has no wheezes. She has no rales.   Very decreased manjula, no wheezing or rales     Abdominal: Soft. Bowel sounds are normal. She exhibits no distension.   Musculoskeletal: Normal range of motion. She exhibits edema (trace LE edema).   Neurological: She is alert and oriented to person, place, and time.   Skin: Skin is warm and dry. No erythema.   Psychiatric: She has a normal mood and affect. Her behavior is normal.   Vitals reviewed.        RESULTS    CXR 1/8/18  FINDINGS:    Lungs:  Mild chronic hyperinflation at right apex.  Patient has history of   COPD.  No acute infiltrates, allowing for obesity which obscures some   resolution.  No pneumonia or CHF.  Mild biapical pleural scarring.    Pleural space:  No significant pleural effusion.  Slight focal thickening   along superior aspect of the right major fissure versus trace interfissural   fluid.  No pneumothorax.    Heart:  Mild cardiomegaly.    Mediastinum: No acute abnormality compared to prior exam.    Bones/joints:  Thoracolumbar scoliosis.  Degenerative changes of the spine.    Mild DJD of bilateral shoulders.     IMPRESSION:  1.  Mild cardiomegaly.  2.  COPD.  3.  No pneumonia or CHF.    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/ELIZABETH - Primary    Overview     Group 3 due to COPD  12/2017 - RVSP 45           Relevant Medications    furosemide (LASIX) 20 MG tablet    spironolactone (ALDACTONE) 50 MG tablet    Chronic diastolic congestive heart failure    Atrial fibrillation with RVR       Respiratory    Severe COPD on home O2 baseline 4 to 6 Liters    Relevant Medications    albuterol (PROVENTIL) (2.5 MG/3ML) 0.083% nebulizer solution    levocetirizine (XYZAL) 5 MG tablet    albuterol (PROVENTIL HFA;VENTOLIN HFA) 108 (90 Base) MCG/ACT inhaler    Mixed Respiratory failure, acute-on-chronic       Other    Dependence on supplemental oxygen    Overview     · A.  4 L at rest and up to 6 with exertion.                    DISCUSSION    She is slowly recovering from her recent pneumonia.  She still quite debilitated and weak.  She does have home health at home.  She is eating and drinking okay.  She denies any acute pain.  She is quite dyspneic with any activity.  Continue on her current chronic regimen of Breo and Incruse with albuterol nebs as needed.  Chronic oxygen at 3-4 L nasal cannula.  I would like to get a download of her CPAP from "[employer] she may need a BiPAP instead due to her chronic respiratory failure.  We did go over proper nutrition as well as continued activity following her recent hospital stay.    I did give her information to follow up with [facility], or the heart failure or A. fib clinic regarding her Eliquis and the cost of her medications.    Follow-up in 3 months or sooner if needed.  Call with any questions.  We also discussed when to return to the  hospital with worsening shortness of air, acute chest pain or palpitations, or any evidence of acute bleeding on Eliquis.    I spent 40 minutes with the patient. I spent > 50% percent of this time counseling and discussing diagnostic testing, evaluation, current status, treatment options and management.    Gilda Jesus, APRN  02/07/20181:39 PM  Electronically signed     Please note that portions of this note were completed with a voice recognition program. Efforts were made to edit the dictations, but occasionally words are mistranscribed.      CC: Nick Mcginnis MD

## 2018-02-07 NOTE — PLAN OF CARE
Problem: Patient Care Overview (Adult)  Goal: Plan of Care Review  Outcome: Ongoing (interventions implemented as appropriate)   01/05/18 0510   Coping/Psychosocial Response Interventions   Plan Of Care Reviewed With patient   Patient Care Overview   Progress improving   Outcome Evaluation   Outcome Summary/Follow up Plan Pt transfered from  after rapid response called. Pt had become lethargic and hypotensive after ativan and BP meds given. Fluid bolus given, BiPAP initiated, and pt sent to ICU. Pt more alert and BP improved upon coming to unit, crackles present in lung bases-Lasix given. Pt rested well on BiPAP throughout shift..     Goal: Adult Individualization and Mutuality  Outcome: Ongoing (interventions implemented as appropriate)    Goal: Discharge Needs Assessment  Outcome: Ongoing (interventions implemented as appropriate)      Problem: Anxiety (Adult)  Goal: Identify Related Risk Factors and Signs and Symptoms  Outcome: Ongoing (interventions implemented as appropriate)    Goal: Reduction/Resolution  Outcome: Ongoing (interventions implemented as appropriate)      Problem: Fall Risk (Adult)  Goal: Identify Related Risk Factors and Signs and Symptoms  Outcome: Ongoing (interventions implemented as appropriate)    Goal: Absence of Falls  Outcome: Ongoing (interventions implemented as appropriate)      Problem: Respiratory Insufficiency (Adult)  Goal: Identify Related Risk Factors and Signs and Symptoms  Outcome: Ongoing (interventions implemented as appropriate)    Goal: Acid/Base Balance  Outcome: Ongoing (interventions implemented as appropriate)    Goal: Effective Ventilation  Outcome: Ongoing (interventions implemented as appropriate)         radiology results/need for outpatient follow-up/return to ED if symptoms worsen, persist or questions arise/lab results

## 2018-02-28 ENCOUNTER — TELEPHONE (OUTPATIENT)
Dept: CARDIOLOGY | Facility: CLINIC | Age: 73
End: 2018-02-28

## 2018-02-28 NOTE — TELEPHONE ENCOUNTER
The patient called stating that she is having difficulty paying for apixaban and would like to switch to something less expensive.  Per TM I advised that we will switch her to rivaroxaban and see if her insurance coverage will be any better for this.  She verbalized agreement and understanding at this time.

## 2018-03-07 DIAGNOSIS — R06.02 SOB (SHORTNESS OF BREATH): ICD-10-CM

## 2018-04-18 DIAGNOSIS — R06.02 SOB (SHORTNESS OF BREATH): ICD-10-CM

## 2018-04-18 NOTE — TELEPHONE ENCOUNTER
Pt LVM requesting samples of Rx Breo and Incruse. Spoke with pt's  and informed him that samples will be placed up front. Pt's  verbalized understanding and appreciation.

## 2023-08-05 NOTE — PLAN OF CARE
Problem: Patient Care Overview (Adult)  Goal: Plan of Care Review  Outcome: Ongoing (interventions implemented as appropriate)  Patient had panic attack this afternoon, meds given and o2 turned up. Patient calmed down enough to eat dinner, bp subsequently came down. Pt to be DC'd to [facility] when bed available.    01/10/18 2021   Coping/Psychosocial Response Interventions   Plan Of Care Reviewed With patient   Patient Care Overview   Progress improving     Goal: Adult Individualization and Mutuality  Outcome: Ongoing (interventions implemented as appropriate)    Goal: Discharge Needs Assessment  Outcome: Ongoing (interventions implemented as appropriate)      Problem: Anxiety (Adult)  Goal: Identify Related Risk Factors and Signs and Symptoms  Outcome: Ongoing (interventions implemented as appropriate)    Goal: Reduction/Resolution  Outcome: Ongoing (interventions implemented as appropriate)      Problem: Fall Risk (Adult)  Goal: Identify Related Risk Factors and Signs and Symptoms  Outcome: Ongoing (interventions implemented as appropriate)    Goal: Absence of Falls  Outcome: Ongoing (interventions implemented as appropriate)      Problem: Respiratory Insufficiency (Adult)  Goal: Identify Related Risk Factors and Signs and Symptoms  Outcome: Ongoing (interventions implemented as appropriate)    Goal: Acid/Base Balance  Outcome: Ongoing (interventions implemented as appropriate)    Goal: Effective Ventilation  Outcome: Ongoing (interventions implemented as appropriate)      Problem: Pressure Ulcer Risk (Yoseph Scale) (Adult,Obstetrics,Pediatric)  Goal: Identify Related Risk Factors and Signs and Symptoms  Outcome: Ongoing (interventions implemented as appropriate)    Goal: Skin Integrity  Outcome: Ongoing (interventions implemented as appropriate)         Spontaneous, unlabored and symmetrical